# Patient Record
Sex: MALE | Race: WHITE | Employment: OTHER | URBAN - METROPOLITAN AREA
[De-identification: names, ages, dates, MRNs, and addresses within clinical notes are randomized per-mention and may not be internally consistent; named-entity substitution may affect disease eponyms.]

---

## 2017-12-14 PROBLEM — R55 SYNCOPE AND COLLAPSE: Status: ACTIVE | Noted: 2017-12-14

## 2017-12-14 PROBLEM — I24.9 ACS (ACUTE CORONARY SYNDROME) (HCC): Status: ACTIVE | Noted: 2017-12-14

## 2017-12-20 PROBLEM — I50.32 CHRONIC DIASTOLIC HEART FAILURE (HCC): Status: ACTIVE | Noted: 2017-12-20

## 2018-01-04 PROBLEM — Z95.2 S/P AVR (AORTIC VALVE REPLACEMENT): Status: ACTIVE | Noted: 2018-01-04

## 2018-01-04 PROBLEM — Z95.1 S/P CABG X 1: Status: ACTIVE | Noted: 2018-01-04

## 2018-01-09 ENCOUNTER — OFFICE VISIT (OUTPATIENT)
Dept: CARDIOLOGY CLINIC | Age: 63
End: 2018-01-09

## 2018-01-09 VITALS
BODY MASS INDEX: 34.44 KG/M2 | SYSTOLIC BLOOD PRESSURE: 122 MMHG | WEIGHT: 246 LBS | HEART RATE: 134 BPM | HEIGHT: 71 IN | DIASTOLIC BLOOD PRESSURE: 74 MMHG

## 2018-01-09 DIAGNOSIS — Z95.2 S/P AVR (AORTIC VALVE REPLACEMENT): Primary | ICD-10-CM

## 2018-01-09 DIAGNOSIS — Z95.1 S/P CABG X 1: ICD-10-CM

## 2018-01-09 PROCEDURE — 93000 ELECTROCARDIOGRAM COMPLETE: CPT | Performed by: INTERNAL MEDICINE

## 2018-01-09 PROCEDURE — 99214 OFFICE O/P EST MOD 30 MIN: CPT | Performed by: INTERNAL MEDICINE

## 2018-01-09 NOTE — PROGRESS NOTES
Pt here in office & educated on ADIEL, DCCV  for Dx: atrial flutter, scheduled for 1/10/2018 @ 0700, w/arrival @ 0600, @ [facility]; risks explained; & consents signed. Instructions given to pt to:  NPO after midnight night before procedure; Call hospital @ [phone] to pre-register; May take rest of morning meds. am including Eliquis of procedure, hold Lasix; & pt voiced understanding. Copies of consent, pre-testing orders, & info. sheet given to [facility] for scanning.

## 2018-01-10 ENCOUNTER — HOSPITAL ENCOUNTER (OUTPATIENT)
Dept: CARDIOLOGY | Age: 63
Discharge: HOME | End: 2018-01-10
Attending: INTERNAL MEDICINE | Admitting: INTERNAL MEDICINE

## 2018-01-10 ENCOUNTER — TELEPHONE (OUTPATIENT)
Dept: CARDIOLOGY CLINIC | Age: 63
End: 2018-01-10

## 2018-01-10 VITALS
RESPIRATION RATE: 23 BRPM | DIASTOLIC BLOOD PRESSURE: 75 MMHG | OXYGEN SATURATION: 92 % | SYSTOLIC BLOOD PRESSURE: 102 MMHG | HEART RATE: 93 BPM

## 2018-01-10 LAB
ANION GAP SERPL CALCULATED.3IONS-SCNC: 14 MMOL/L (ref 4–16)
BUN BLDV-MCNC: 19 MG/DL (ref 6–23)
CALCIUM SERPL-MCNC: 9.2 MG/DL (ref 8.3–10.6)
CHLORIDE BLD-SCNC: 99 MMOL/L (ref 99–110)
CO2: 26 MMOL/L (ref 21–32)
CREAT SERPL-MCNC: 1 MG/DL (ref 0.9–1.3)
GFR AFRICAN AMERICAN: >60 ML/MIN/1.73M2
GFR NON-AFRICAN AMERICAN: >60 ML/MIN/1.73M2
GLUCOSE BLD-MCNC: 162 MG/DL (ref 70–99)
INR BLD: 1.58 INDEX
POTASSIUM SERPL-SCNC: 4.5 MMOL/L (ref 3.5–5.1)
PROTHROMBIN TIME: 18.2 SECONDS (ref 9.12–12.5)
SODIUM BLD-SCNC: 139 MMOL/L (ref 135–145)

## 2018-01-10 PROCEDURE — 93325 DOPPLER ECHO COLOR FLOW MAPG: CPT | Performed by: INTERNAL MEDICINE

## 2018-01-10 PROCEDURE — 93312 ECHO TRANSESOPHAGEAL: CPT | Performed by: INTERNAL MEDICINE

## 2018-01-10 PROCEDURE — 92960 CARDIOVERSION ELECTRIC EXT: CPT | Performed by: INTERNAL MEDICINE

## 2018-01-10 RX ORDER — CARVEDILOL 6.25 MG/1
6.25 TABLET ORAL 2 TIMES DAILY
Qty: 60 TABLET | Refills: 3 | Status: SHIPPED | OUTPATIENT
Start: 2018-01-10 | End: 2019-06-07 | Stop reason: SDUPTHER

## 2018-01-10 NOTE — TELEPHONE ENCOUNTER
Called pt to schedule follow up cardioversion done 1/10/18, per Dr Rolin Nissen in patient call back work que, also ekg in one week. EKG is scheduled  on 1/17/18. Left message to call office to schedule follow up cardioversion.

## 2018-01-11 LAB
EKG ATRIAL RATE: 262 BPM
EKG ATRIAL RATE: 89 BPM
EKG DIAGNOSIS: NORMAL
EKG DIAGNOSIS: NORMAL
EKG P AXIS: 243 DEGREES
EKG P AXIS: 60 DEGREES
EKG P-R INTERVAL: 192 MS
EKG Q-T INTERVAL: 294 MS
EKG Q-T INTERVAL: 388 MS
EKG QRS DURATION: 102 MS
EKG QRS DURATION: 128 MS
EKG QTC CALCULATION (BAZETT): 434 MS
EKG QTC CALCULATION (BAZETT): 472 MS
EKG R AXIS: 30 DEGREES
EKG R AXIS: 32 DEGREES
EKG T AXIS: 167 DEGREES
EKG T AXIS: 72 DEGREES
EKG VENTRICULAR RATE: 131 BPM
EKG VENTRICULAR RATE: 89 BPM

## 2018-01-18 ENCOUNTER — PROCEDURE VISIT (OUTPATIENT)
Dept: CARDIOLOGY CLINIC | Age: 63
End: 2018-01-18

## 2018-01-18 ENCOUNTER — NURSE ONLY (OUTPATIENT)
Dept: CARDIOLOGY CLINIC | Age: 63
End: 2018-01-18

## 2018-01-18 VITALS
DIASTOLIC BLOOD PRESSURE: 80 MMHG | SYSTOLIC BLOOD PRESSURE: 128 MMHG | HEIGHT: 71 IN | HEART RATE: 78 BPM | BODY MASS INDEX: 34.02 KG/M2 | WEIGHT: 243 LBS

## 2018-01-18 DIAGNOSIS — Z95.2 S/P AVR (AORTIC VALVE REPLACEMENT): ICD-10-CM

## 2018-01-18 DIAGNOSIS — Z95.1 S/P CABG X 1: Primary | ICD-10-CM

## 2018-01-18 DIAGNOSIS — Z95.1 S/P CABG X 1: ICD-10-CM

## 2018-01-18 DIAGNOSIS — Z95.2 S/P AVR (AORTIC VALVE REPLACEMENT): Primary | ICD-10-CM

## 2018-01-18 LAB
LV EF: 53 %
LVEF MODALITY: NORMAL

## 2018-01-18 PROCEDURE — 93015 CV STRESS TEST SUPVJ I&R: CPT | Performed by: INTERNAL MEDICINE

## 2018-01-18 PROCEDURE — 93000 ELECTROCARDIOGRAM COMPLETE: CPT | Performed by: INTERNAL MEDICINE

## 2018-01-18 PROCEDURE — 93306 TTE W/DOPPLER COMPLETE: CPT | Performed by: INTERNAL MEDICINE

## 2018-01-19 ENCOUNTER — HOSPITAL ENCOUNTER (OUTPATIENT)
Dept: OTHER | Age: 63
Discharge: HOME | End: 2018-01-31
Attending: INTERNAL MEDICINE | Admitting: INTERNAL MEDICINE

## 2018-01-22 ENCOUNTER — TELEPHONE (OUTPATIENT)
Dept: CARDIOLOGY CLINIC | Age: 63
End: 2018-01-22

## 2018-01-22 NOTE — TELEPHONE ENCOUNTER
Left ventricular systolic function is normal with an ejection fraction of   50-55%.   Mild concentric left ventricular hypertrophy.   No evidence of diastolic dysfunction.   The left atrium is mild to moderately dilated.   Possible stenosis of bioprosthetic valve in aortic position with a possible   mild perivalvular leak.   The aortic valve area is 1.08 cm2 with a maximum gradient of 37 mmHg and a   mean gradient of 20 mmHg.   Mitral annular calcification is present.   No other significant valvulopathy seen.   No evidence of pericardial effusion.   Advised pt of echo results.

## 2018-01-29 LAB
GLUCOSE BLD-MCNC: 108 MG/DL (ref 70–99)
GLUCOSE BLD-MCNC: 118 MG/DL (ref 70–99)

## 2018-01-30 LAB
GLUCOSE BLD-MCNC: 119 MG/DL (ref 70–99)
GLUCOSE BLD-MCNC: 99 MG/DL (ref 70–99)

## 2018-02-01 ENCOUNTER — HOSPITAL ENCOUNTER (OUTPATIENT)
Dept: OTHER | Age: 63
Discharge: HOME | End: 2018-02-28
Attending: INTERNAL MEDICINE | Admitting: INTERNAL MEDICINE

## 2018-02-01 LAB
GLUCOSE BLD-MCNC: 104 MG/DL (ref 70–99)
GLUCOSE BLD-MCNC: 113 MG/DL (ref 70–99)

## 2018-02-05 LAB
GLUCOSE BLD-MCNC: 165 MG/DL (ref 70–99)
GLUCOSE BLD-MCNC: 98 MG/DL (ref 70–99)

## 2018-02-06 LAB
GLUCOSE BLD-MCNC: 115 MG/DL (ref 70–99)
GLUCOSE BLD-MCNC: 133 MG/DL (ref 70–99)

## 2018-02-08 LAB
GLUCOSE BLD-MCNC: 112 MG/DL (ref 70–99)
GLUCOSE BLD-MCNC: 130 MG/DL (ref 70–99)

## 2018-02-15 ENCOUNTER — OFFICE VISIT (OUTPATIENT)
Dept: CARDIOLOGY CLINIC | Age: 63
End: 2018-02-15

## 2018-02-15 ENCOUNTER — NURSE ONLY (OUTPATIENT)
Dept: CARDIOLOGY CLINIC | Age: 63
End: 2018-02-15

## 2018-02-15 VITALS
SYSTOLIC BLOOD PRESSURE: 122 MMHG | BODY MASS INDEX: 34.3 KG/M2 | HEIGHT: 71 IN | WEIGHT: 245 LBS | DIASTOLIC BLOOD PRESSURE: 78 MMHG | HEART RATE: 70 BPM

## 2018-02-15 DIAGNOSIS — Z95.2 S/P AVR (AORTIC VALVE REPLACEMENT): ICD-10-CM

## 2018-02-15 DIAGNOSIS — Z95.1 S/P CABG X 1: ICD-10-CM

## 2018-02-15 DIAGNOSIS — I48.0 PAF (PAROXYSMAL ATRIAL FIBRILLATION) (HCC): Primary | ICD-10-CM

## 2018-02-15 DIAGNOSIS — Z95.2 S/P AVR (AORTIC VALVE REPLACEMENT): Primary | ICD-10-CM

## 2018-02-15 PROCEDURE — 99214 OFFICE O/P EST MOD 30 MIN: CPT | Performed by: INTERNAL MEDICINE

## 2018-02-15 NOTE — PROGRESS NOTES
Applied @ 1030, 48hr holter w/monitor# [id number] for Dx of PAF. Educated pt on proper holter usage; how to keep sx diary; & when to bring monitor back to office. Pt voiced understanding. Holter order,including monitor & card#, & time started, to front nurse's station in 's in-box.

## 2018-02-21 PROCEDURE — 93228 REMOTE 30 DAY ECG REV/REPORT: CPT | Performed by: INTERNAL MEDICINE

## 2018-02-26 ENCOUNTER — TELEPHONE (OUTPATIENT)
Dept: CARDIOLOGY CLINIC | Age: 63
End: 2018-02-26

## 2018-02-26 NOTE — TELEPHONE ENCOUNTER
Patient was contacted to schedule consult with Dr Lavera Carrel as requested by Dr Shana Lee. Patient states if he is to continue Eliquis until he is seen by Dr Lavera Carrel he would like to know if he could receive more samples. He stated he could be in to pick the samples up tomorrow.

## 2018-03-01 ENCOUNTER — HOSPITAL ENCOUNTER (OUTPATIENT)
Dept: OTHER | Age: 63
Discharge: HOME | End: 2018-03-31
Attending: INTERNAL MEDICINE | Admitting: INTERNAL MEDICINE

## 2018-03-21 ENCOUNTER — TELEPHONE (OUTPATIENT)
Dept: CARDIOLOGY CLINIC | Age: 63
End: 2018-03-21

## 2018-03-21 NOTE — TELEPHONE ENCOUNTER
Per Ak's patient call back note , Ak wanted ekg next week.   Pt has appt with Latrice Rojas and will get ekg on the visit

## 2018-03-28 ENCOUNTER — INITIAL CONSULT (OUTPATIENT)
Dept: CARDIOLOGY CLINIC | Age: 63
End: 2018-03-28

## 2018-03-28 VITALS
OXYGEN SATURATION: 98 % | HEIGHT: 71 IN | WEIGHT: 243 LBS | SYSTOLIC BLOOD PRESSURE: 126 MMHG | BODY MASS INDEX: 34.02 KG/M2 | DIASTOLIC BLOOD PRESSURE: 72 MMHG | HEART RATE: 66 BPM | TEMPERATURE: 97.9 F

## 2018-03-28 DIAGNOSIS — Z95.1 S/P CABG X 1: ICD-10-CM

## 2018-03-28 DIAGNOSIS — Z95.2 S/P AVR (AORTIC VALVE REPLACEMENT): ICD-10-CM

## 2018-03-28 DIAGNOSIS — I48.0 PAF (PAROXYSMAL ATRIAL FIBRILLATION) (HCC): Primary | ICD-10-CM

## 2018-03-28 PROCEDURE — 99203 OFFICE O/P NEW LOW 30 MIN: CPT | Performed by: INTERNAL MEDICINE

## 2018-03-28 PROCEDURE — 93000 ELECTROCARDIOGRAM COMPLETE: CPT | Performed by: INTERNAL MEDICINE

## 2018-03-28 RX ORDER — ZOLPIDEM TARTRATE 10 MG/1
1 TABLET ORAL NIGHTLY PRN
Refills: 0 | COMMUNITY
Start: 2018-01-02 | End: 2018-11-05

## 2018-04-01 ENCOUNTER — HOSPITAL ENCOUNTER (OUTPATIENT)
Dept: OTHER | Age: 63
Discharge: HOME | End: 2018-04-30
Attending: INTERNAL MEDICINE | Admitting: INTERNAL MEDICINE

## 2018-04-08 ASSESSMENT — ENCOUNTER SYMPTOMS
CHEST TIGHTNESS: 0
DIARRHEA: 0
COLOR CHANGE: 0
BACK PAIN: 0
ABDOMINAL PAIN: 0
WHEEZING: 0
NAUSEA: 0
BLOOD IN STOOL: 0
EYE PAIN: 0
VOMITING: 0
COUGH: 0
CONSTIPATION: 0
PHOTOPHOBIA: 0
SHORTNESS OF BREATH: 0

## 2018-05-01 ENCOUNTER — HOSPITAL ENCOUNTER (OUTPATIENT)
Dept: OTHER | Age: 63
Discharge: HOME | End: 2018-05-31
Attending: INTERNAL MEDICINE | Admitting: INTERNAL MEDICINE

## 2018-05-17 ENCOUNTER — TELEPHONE (OUTPATIENT)
Dept: CARDIOLOGY CLINIC | Age: 63
End: 2018-05-17

## 2018-07-06 ENCOUNTER — OFFICE VISIT (OUTPATIENT)
Dept: CARDIOLOGY CLINIC | Age: 63
End: 2018-07-06

## 2018-07-06 VITALS
WEIGHT: 240 LBS | SYSTOLIC BLOOD PRESSURE: 130 MMHG | DIASTOLIC BLOOD PRESSURE: 74 MMHG | BODY MASS INDEX: 33.6 KG/M2 | HEIGHT: 71 IN | HEART RATE: 72 BPM

## 2018-07-06 DIAGNOSIS — Z95.1 S/P CABG X 1: ICD-10-CM

## 2018-07-06 DIAGNOSIS — Z95.2 S/P AVR (AORTIC VALVE REPLACEMENT): Primary | ICD-10-CM

## 2018-07-06 PROCEDURE — 99214 OFFICE O/P EST MOD 30 MIN: CPT | Performed by: INTERNAL MEDICINE

## 2018-11-07 PROBLEM — K43.2 RECURRENT INCISIONAL HERNIA: Status: ACTIVE | Noted: 2018-11-07

## 2018-12-12 LAB
ALBUMIN SERPL-MCNC: 4.3 G/DL
ALP BLD-CCNC: 66 U/L
ALT SERPL-CCNC: 20 U/L
ANION GAP SERPL CALCULATED.3IONS-SCNC: NORMAL MMOL/L
AST SERPL-CCNC: 22 U/L
AVERAGE GLUCOSE: NORMAL
BILIRUB SERPL-MCNC: 0.8 MG/DL (ref 0.1–1.4)
BUN BLDV-MCNC: 16 MG/DL
CALCIUM SERPL-MCNC: 9.4 MG/DL
CHLORIDE BLD-SCNC: 100 MMOL/L
CHOLESTEROL, TOTAL: 127 MG/DL
CHOLESTEROL/HDL RATIO: NORMAL
CO2: 25 MMOL/L
CREAT SERPL-MCNC: 0.9 MG/DL
GFR CALCULATED: 90
GLUCOSE BLD-MCNC: 144 MG/DL
HBA1C MFR BLD: 6.3 %
HDLC SERPL-MCNC: 39 MG/DL (ref 35–70)
LDL CHOLESTEROL CALCULATED: 77 MG/DL (ref 0–160)
POTASSIUM SERPL-SCNC: 4.5 MMOL/L
SODIUM BLD-SCNC: 137 MMOL/L
TOTAL PROTEIN: 7
TRIGL SERPL-MCNC: 55 MG/DL
VLDLC SERPL CALC-MCNC: 11 MG/DL

## 2019-02-11 ENCOUNTER — TELEPHONE (OUTPATIENT)
Dept: CARDIOLOGY CLINIC | Age: 64
End: 2019-02-11

## 2019-02-12 ENCOUNTER — TELEPHONE (OUTPATIENT)
Dept: CARDIOLOGY CLINIC | Age: 64
End: 2019-02-12

## 2019-02-12 NOTE — TELEPHONE ENCOUNTER
Wife called yesterday asking for  Call to discuss his medications   She stated no one called her back

## 2019-02-13 ENCOUNTER — OFFICE VISIT (OUTPATIENT)
Dept: CARDIOLOGY CLINIC | Age: 64
End: 2019-02-13
Payer: COMMERCIAL

## 2019-02-13 VITALS
DIASTOLIC BLOOD PRESSURE: 82 MMHG | WEIGHT: 252.6 LBS | SYSTOLIC BLOOD PRESSURE: 124 MMHG | BODY MASS INDEX: 35.36 KG/M2 | HEIGHT: 71 IN | HEART RATE: 64 BPM

## 2019-02-13 DIAGNOSIS — Z95.1 S/P CABG X 1: ICD-10-CM

## 2019-02-13 DIAGNOSIS — Z95.2 S/P AVR (AORTIC VALVE REPLACEMENT): Primary | ICD-10-CM

## 2019-02-13 PROCEDURE — 99214 OFFICE O/P EST MOD 30 MIN: CPT | Performed by: INTERNAL MEDICINE

## 2019-02-25 ENCOUNTER — PROCEDURE VISIT (OUTPATIENT)
Dept: CARDIOLOGY CLINIC | Age: 64
End: 2019-02-25
Payer: COMMERCIAL

## 2019-02-25 DIAGNOSIS — Z95.2 S/P AVR (AORTIC VALVE REPLACEMENT): ICD-10-CM

## 2019-02-25 DIAGNOSIS — Z95.1 S/P CABG X 1: ICD-10-CM

## 2019-02-25 LAB
LV EF: 58 %
LVEF MODALITY: NORMAL

## 2019-02-25 PROCEDURE — 93306 TTE W/DOPPLER COMPLETE: CPT | Performed by: INTERNAL MEDICINE

## 2019-02-26 ENCOUNTER — TELEPHONE (OUTPATIENT)
Dept: CARDIOLOGY CLINIC | Age: 64
End: 2019-02-26

## 2019-02-26 DIAGNOSIS — Z95.2 S/P AVR (AORTIC VALVE REPLACEMENT): Primary | ICD-10-CM

## 2019-03-29 ENCOUNTER — TELEPHONE (OUTPATIENT)
Dept: CARDIOLOGY CLINIC | Age: 64
End: 2019-03-29

## 2019-04-15 ENCOUNTER — TELEPHONE (OUTPATIENT)
Dept: CARDIOLOGY CLINIC | Age: 64
End: 2019-04-15

## 2019-04-16 NOTE — TELEPHONE ENCOUNTER
Questions answered, pt will check with medicare and decide if he wants to switch and then discuss with dr Gilford Slimmer

## 2019-05-08 ENCOUNTER — TELEPHONE (OUTPATIENT)
Dept: CARDIOLOGY CLINIC | Age: 64
End: 2019-05-08

## 2019-05-11 DIAGNOSIS — Z86.010 HX OF COLONIC POLYP: ICD-10-CM

## 2019-05-11 DIAGNOSIS — Z87.438 HISTORY OF BENIGN PROSTATIC HYPERPLASIA: ICD-10-CM

## 2019-05-11 DIAGNOSIS — I10 ESSENTIAL HYPERTENSION: ICD-10-CM

## 2019-05-11 DIAGNOSIS — E78.5 HYPERLIPIDEMIA, UNSPECIFIED HYPERLIPIDEMIA TYPE: ICD-10-CM

## 2019-05-11 DIAGNOSIS — I48.91 ATRIAL FIBRILLATION, UNSPECIFIED TYPE (HCC): ICD-10-CM

## 2019-05-11 RX ORDER — CLOTRIMAZOLE AND BETAMETHASONE DIPROPIONATE 10; .64 MG/G; MG/G
CREAM TOPICAL 2 TIMES DAILY
COMMUNITY
End: 2019-06-07 | Stop reason: ALTCHOICE

## 2019-05-11 RX ORDER — SILDENAFIL 50 MG/1
50 TABLET, FILM COATED ORAL PRN
COMMUNITY
End: 2019-06-07

## 2019-05-31 LAB
A/G RATIO: 1.6 (ref 1.1–2.2)
ALBUMIN SERPL-MCNC: 4.5 G/DL (ref 3.4–5)
ALP BLD-CCNC: 64 U/L (ref 40–129)
ALT SERPL-CCNC: 17 U/L (ref 10–40)
ANION GAP SERPL CALCULATED.3IONS-SCNC: 17 MMOL/L (ref 3–16)
AST SERPL-CCNC: 19 U/L (ref 15–37)
BILIRUB SERPL-MCNC: 0.7 MG/DL (ref 0–1)
BUN BLDV-MCNC: 13 MG/DL (ref 7–20)
CALCIUM SERPL-MCNC: 9.4 MG/DL (ref 8.3–10.6)
CHLORIDE BLD-SCNC: 101 MMOL/L (ref 99–110)
CHOLESTEROL, FASTING: 124 MG/DL (ref 0–199)
CO2: 21 MMOL/L (ref 21–32)
CREAT SERPL-MCNC: 0.9 MG/DL (ref 0.8–1.3)
ESTIMATED AVERAGE GLUCOSE: 142.7 MG/DL
GFR AFRICAN AMERICAN: >60
GFR NON-AFRICAN AMERICAN: >60
GLOBULIN: 2.9 G/DL
GLUCOSE FASTING: 148 MG/DL (ref 70–99)
HBA1C MFR BLD: 6.6 %
HDLC SERPL-MCNC: 43 MG/DL (ref 40–60)
LDL CHOLESTEROL CALCULATED: 64 MG/DL
POTASSIUM SERPL-SCNC: 4.8 MMOL/L (ref 3.5–5.1)
SODIUM BLD-SCNC: 139 MMOL/L (ref 136–145)
TOTAL PROTEIN: 7.4 G/DL (ref 6.4–8.2)
TRIGLYCERIDE, FASTING: 85 MG/DL (ref 0–150)
VLDLC SERPL CALC-MCNC: 17 MG/DL

## 2019-06-07 ENCOUNTER — OFFICE VISIT (OUTPATIENT)
Dept: FAMILY MEDICINE CLINIC | Age: 64
End: 2019-06-07
Payer: MEDICARE

## 2019-06-07 VITALS
SYSTOLIC BLOOD PRESSURE: 120 MMHG | HEART RATE: 60 BPM | WEIGHT: 244 LBS | DIASTOLIC BLOOD PRESSURE: 84 MMHG | HEIGHT: 71 IN | BODY MASS INDEX: 34.16 KG/M2

## 2019-06-07 DIAGNOSIS — E78.5 HYPERLIPIDEMIA, UNSPECIFIED HYPERLIPIDEMIA TYPE: ICD-10-CM

## 2019-06-07 DIAGNOSIS — Z87.438 HISTORY OF BENIGN PROSTATIC HYPERPLASIA: ICD-10-CM

## 2019-06-07 DIAGNOSIS — I10 ESSENTIAL HYPERTENSION: ICD-10-CM

## 2019-06-07 DIAGNOSIS — E11.9 TYPE 2 DIABETES MELLITUS WITHOUT COMPLICATION, WITHOUT LONG-TERM CURRENT USE OF INSULIN (HCC): Primary | ICD-10-CM

## 2019-06-07 DIAGNOSIS — I48.0 PAROXYSMAL ATRIAL FIBRILLATION (HCC): ICD-10-CM

## 2019-06-07 DIAGNOSIS — E66.01 CLASS 2 SEVERE OBESITY DUE TO EXCESS CALORIES WITH SERIOUS COMORBIDITY AND BODY MASS INDEX (BMI) OF 37.0 TO 37.9 IN ADULT (HCC): ICD-10-CM

## 2019-06-07 DIAGNOSIS — Z95.1 S/P CABG X 1: ICD-10-CM

## 2019-06-07 PROCEDURE — 99214 OFFICE O/P EST MOD 30 MIN: CPT | Performed by: FAMILY MEDICINE

## 2019-06-07 PROCEDURE — 2022F DILAT RTA XM EVC RTNOPTHY: CPT | Performed by: FAMILY MEDICINE

## 2019-06-07 PROCEDURE — 1036F TOBACCO NON-USER: CPT | Performed by: FAMILY MEDICINE

## 2019-06-07 PROCEDURE — 3017F COLORECTAL CA SCREEN DOC REV: CPT | Performed by: FAMILY MEDICINE

## 2019-06-07 PROCEDURE — 3044F HG A1C LEVEL LT 7.0%: CPT | Performed by: FAMILY MEDICINE

## 2019-06-07 PROCEDURE — G8417 CALC BMI ABV UP PARAM F/U: HCPCS | Performed by: FAMILY MEDICINE

## 2019-06-07 PROCEDURE — G8598 ASA/ANTIPLAT THER USED: HCPCS | Performed by: FAMILY MEDICINE

## 2019-06-07 PROCEDURE — G8427 DOCREV CUR MEDS BY ELIG CLIN: HCPCS | Performed by: FAMILY MEDICINE

## 2019-06-07 RX ORDER — CARVEDILOL 6.25 MG/1
6.25 TABLET ORAL 2 TIMES DAILY
Qty: 180 TABLET | Refills: 1 | Status: SHIPPED | OUTPATIENT
Start: 2019-06-07 | End: 2019-12-09 | Stop reason: SDUPTHER

## 2019-06-07 RX ORDER — ATORVASTATIN CALCIUM 10 MG/1
10 TABLET, FILM COATED ORAL DAILY
Qty: 90 TABLET | Refills: 1 | Status: SHIPPED | OUTPATIENT
Start: 2019-06-07 | End: 2019-11-17 | Stop reason: SDUPTHER

## 2019-06-07 RX ORDER — ASPIRIN 81 MG/1
81 TABLET ORAL DAILY
Qty: 90 TABLET | Refills: 1 | Status: SHIPPED | OUTPATIENT
Start: 2019-06-07 | End: 2021-01-04 | Stop reason: SDUPTHER

## 2019-06-07 RX ORDER — TAMSULOSIN HYDROCHLORIDE 0.4 MG/1
0.4 CAPSULE ORAL DAILY
Qty: 90 CAPSULE | Refills: 1 | Status: SHIPPED | OUTPATIENT
Start: 2019-06-07 | End: 2019-12-09 | Stop reason: SDUPTHER

## 2019-06-07 ASSESSMENT — ENCOUNTER SYMPTOMS
ABDOMINAL PAIN: 0
SHORTNESS OF BREATH: 1
CHEST TIGHTNESS: 0

## 2019-06-07 NOTE — PROGRESS NOTES
6/7/2019     Rogue Heimlich is a 58 y.o. male who presents today with   Chief Complaint   Patient presents with    6 Month Follow-Up     - no c/o       HPI    Patient is here for follow-up of multiple medical problems. He discussed Eliquis therapy with his cardiologist in day he had a 48 hour Holter that showed some bursts of atrial fibrillation. Therefore, he should stay on this at least for now. He is asking about warfarin over Eliquis I recommend Eliquis and he agrees. He gets some shortness of breath with top and activity, but it really doesn't limit his normal activities. Doesn't get any anginal chest pain. He had some pain in his upper back but it's in the area. The rhomboid muscle group. The right scapula. He is urinating okay. Is tolerating all of his medicines. He had labs done this week as he is diabetic and has a history of hyperlipidemia        REVIEW OF SYMPTOMS    Review of Systems   Constitutional: Negative for activity change, appetite change and unexpected weight change. HENT: Negative. Respiratory: Positive for shortness of breath. Negative for chest tightness. Cardiovascular: Negative for chest pain and palpitations. Gastrointestinal: Negative for abdominal pain. Genitourinary: Negative for difficulty urinating, frequency and urgency. Musculoskeletal: Negative. Neurological: Negative for dizziness. Psychiatric/Behavioral: Negative. PAST MEDICAL HISTORY  Past Medical History:   Diagnosis Date    Arthritis     Knees, Feet    Atrial fibrillation (HCC)     Back pain     Lower Back    CAD (coronary artery disease)     Diabetes mellitus ([facility] [address]     H/O echocardiogram 12/14/2017    EF 50-55%.  severe aortic stenosis is present, mod AR, mild MR and TR    History of benign prostatic hyperplasia     History of cardiac cath 12/14/2017    Severe AS, PDA MID 80% disease, RCA 50% distal stenosis, AVR + CABG VS TAVR and PCI, CTS CONS    History of echocardiogram 2019    EF 55-60%, Mildly dilated left atrium, Bioprosthetic aortic valve noted with increased velocities indicating stenosis, Mild paravalvular leak, Mitral annular calcification noted without evidence of stenosis    History of exercise stress test 2018    treadmill    History of Holter monitoring 02/15/2018    48 hour holter monitor  One short run of afib.  Hx of colonic polyp     Hx of Doppler echocardiogram 2018    EF50-55%,possible stenosis bioprosthetic valve possible perivalval leak,    Hyperlipidemia     Hypertension     Murmur, cardiac     Obesity     S/P AVR 12/15/2017    with cabg x1 svg-pda       FAMILY HISTORY  Family History   Problem Relation Age of Onset    Stroke Mother     Arthritis Mother     Early Death Father         Suicide in his 42's    Other Sister         Parkinson    High Blood Pressure Sister        SOCIAL HISTORY  Social History     Socioeconomic History    Marital status:      Spouse name: None    Number of children: None    Years of education: None    Highest education level: None   Occupational History    None   Social Needs    Financial resource strain: None    Food insecurity:     Worry: None     Inability: None    Transportation needs:     Medical: None     Non-medical: None   Tobacco Use    Smoking status: Former Smoker     Packs/day: 1.00     Years: 24.00     Pack years: 24.00     Start date:      Last attempt to quit:      Years since quittin.4    Smokeless tobacco: Never Used   Substance and Sexual Activity    Alcohol use:  Yes     Alcohol/week: 7.2 oz     Types: 12 Cans of beer per week    Drug use: No    Sexual activity: Never   Lifestyle    Physical activity:     Days per week: None     Minutes per session: None    Stress: None   Relationships    Social connections:     Talks on phone: None     Gets together: None     Attends Taoism service: None     Active member of club or organization: None     Attends meetings of clubs or organizations: None     Relationship status: None    Intimate partner violence:     Fear of current or ex partner: None     Emotionally abused: None     Physically abused: None     Forced sexual activity: None   Other Topics Concern    None   Social History Narrative    None        SURGICAL HISTORY  Past Surgical History:   Procedure Laterality Date    AORTIC VALVE REPLACEMENT  12/15/2017    with cabg x1 svg-pda    APPENDECTOMY  2013    COLONOSCOPY  9/16/16    Diverticulosis, polyp x1    HERNIA REPAIR  2014    with Mesh    HERNIA REPAIR  2018    JOINT REPLACEMENT Left 2014    TKA    KNEE CARTILAGE SURGERY Left     URACHAL CYST INCISION      Partial Bladder removal and Appendectomy    VENTRAL HERNIA REPAIR  4/7/16    laprascopic       CURRENT MEDICATIONS  Current Outpatient Medications   Medication Sig Dispense Refill    apixaban (ELIQUIS) 5 MG TABS tablet Take 1 tablet by mouth 2 times daily 180 tablet 0    carvedilol (COREG) 6.25 MG tablet Take 1 tablet by mouth 2 times daily 180 tablet 1    aspirin 81 MG EC tablet Take 1 tablet by mouth daily 90 tablet 1    tamsulosin (FLOMAX) 0.4 MG capsule Take 1 capsule by mouth daily 90 capsule 1    metFORMIN (GLUCOPHAGE) 500 MG tablet Take 4 tablets by mouth daily (with breakfast) 360 tablet 1    atorvastatin (LIPITOR) 10 MG tablet Take 1 tablet by mouth daily 90 tablet 1    Melatonin 5 MG CAPS Take 1 tablet by mouth nightly as needed       Multiple Vitamins-Minerals (PX SENIOR VITAMIN PO) Take 1 tablet by mouth daily       No current facility-administered medications for this visit. ALLERGIES  No Known Allergies    PHYSICAL EXAM    /84   Pulse 60   Ht 5' 11\" (1.803 m)   Wt 244 lb (110.7 kg)   BMI 34.03 kg/m²     Physical Exam   Constitutional: He is oriented to person, place, and time. He appears well-developed and well-nourished.    HENT:   Nose: Nose normal.   Mouth/Throat: Oropharynx is clear and moist.   Eyes: Conjunctivae are normal.   Neck: No thyromegaly present. Cardiovascular: Normal rate, regular rhythm, normal heart sounds and intact distal pulses. Pulmonary/Chest: Effort normal and breath sounds normal. No respiratory distress. He has no wheezes. He has no rales. Abdominal: Soft. He exhibits no distension. There is no tenderness. Musculoskeletal: He exhibits no edema or deformity. Lymphadenopathy:     He has no cervical adenopathy. Neurological: He is alert and oriented to person, place, and time. Psychiatric: He has a normal mood and affect. Nursing note and vitals reviewed. diabetic foot exam is been done by my LPN  I reviewed the patient's labs  His A1c is good at 6.6. Lipids are in range and chemistry panel is normal    ASSESSMENT:    Darlene Newman was seen today for 6 month follow-up. Diagnoses and all orders for this visit:    Type 2 diabetes mellitus without complication, without long-term current use of insulin (HCC)  -     Hemoglobin A1C; Future    Essential hypertension  -     Comprehensive Metabolic Panel; Future  -     Lipid Panel; Future    Hyperlipidemia, unspecified hyperlipidemia type    S/P CABG x 1    Class 2 severe obesity due to excess calories with serious comorbidity and body mass index (BMI) of 37.0 to 37.9 in adult [facility])    History of benign prostatic hyperplasia    Paroxysmal atrial fibrillation (HCC)    Other orders  -     apixaban (ELIQUIS) 5 MG TABS tablet; Take 1 tablet by mouth 2 times daily  -     carvedilol (COREG) 6.25 MG tablet; Take 1 tablet by mouth 2 times daily  -     aspirin 81 MG EC tablet; Take 1 tablet by mouth daily  -     tamsulosin (FLOMAX) 0.4 MG capsule; Take 1 capsule by mouth daily  -     metFORMIN (GLUCOPHAGE) 500 MG tablet; Take 4 tablets by mouth daily (with breakfast)  -     atorvastatin (LIPITOR) 10 MG tablet;  Take 1 tablet by mouth daily            PLAN:  Above problems are all stable    Would continue same medications, diet and exercise plan  Continue follow-up with consultants. Follow-up here in 6 months and get labs prior to that office visit      Return in about 6 months (around 12/7/2019). Electronically signed by Mariam Concepcion MD on 6/7/2019    Please note that this chart was generated using dragon dictation software. Although every effort was made to ensure the accuracy of this automated transcription, some errors in transcription may have occurred.

## 2019-06-07 NOTE — PROGRESS NOTES
Visual inspection:  Deformity/amputation: absent  Skin lesions/pre-ulcerative calluses: absent  Edema: right- negative, left- negative    Sensory exam:  Monofilament sensation: normal  (minimum of 5 random plantar locations tested, avoiding callused areas - > 1 area with absence of sensation is + for neuropathy)    Plus at least one of the following:  Pulses: normal,   Pinprick: Intact  Proprioception: Intact  Vibration (128 Hz):  Intact  Electronically signed by Laverne Sherman LPN on 1/4/5089 at 1:34 AM

## 2019-06-11 ENCOUNTER — TELEPHONE (OUTPATIENT)
Dept: FAMILY MEDICINE CLINIC | Age: 64
End: 2019-06-11

## 2019-06-11 RX ORDER — METFORMIN HYDROCHLORIDE 500 MG/1
2000 TABLET, EXTENDED RELEASE ORAL
Qty: 360 TABLET | Refills: 1 | Status: SHIPPED | OUTPATIENT
Start: 2019-06-11 | End: 2019-12-05 | Stop reason: SDUPTHER

## 2019-06-11 NOTE — TELEPHONE ENCOUNTER
PER [facility] SENT [facility] ([facility])  Electronically signed by Mode Watkins MA on 6/11/2019 at 3:41 PM

## 2019-08-26 ENCOUNTER — OFFICE VISIT (OUTPATIENT)
Dept: CARDIOLOGY CLINIC | Age: 64
End: 2019-08-26
Payer: MEDICARE

## 2019-08-26 VITALS
HEART RATE: 68 BPM | DIASTOLIC BLOOD PRESSURE: 80 MMHG | WEIGHT: 249 LBS | HEIGHT: 71 IN | BODY MASS INDEX: 34.86 KG/M2 | SYSTOLIC BLOOD PRESSURE: 130 MMHG

## 2019-08-26 DIAGNOSIS — Z95.2 S/P AVR (AORTIC VALVE REPLACEMENT): Primary | ICD-10-CM

## 2019-08-26 PROCEDURE — G8598 ASA/ANTIPLAT THER USED: HCPCS | Performed by: INTERNAL MEDICINE

## 2019-08-26 PROCEDURE — 1036F TOBACCO NON-USER: CPT | Performed by: INTERNAL MEDICINE

## 2019-08-26 PROCEDURE — G8427 DOCREV CUR MEDS BY ELIG CLIN: HCPCS | Performed by: INTERNAL MEDICINE

## 2019-08-26 PROCEDURE — 3017F COLORECTAL CA SCREEN DOC REV: CPT | Performed by: INTERNAL MEDICINE

## 2019-08-26 PROCEDURE — 4040F PNEUMOC VAC/ADMIN/RCVD: CPT | Performed by: INTERNAL MEDICINE

## 2019-08-26 PROCEDURE — G8417 CALC BMI ABV UP PARAM F/U: HCPCS | Performed by: INTERNAL MEDICINE

## 2019-08-26 PROCEDURE — 1123F ACP DISCUSS/DSCN MKR DOCD: CPT | Performed by: INTERNAL MEDICINE

## 2019-08-26 PROCEDURE — 99214 OFFICE O/P EST MOD 30 MIN: CPT | Performed by: INTERNAL MEDICINE

## 2019-08-26 NOTE — PROGRESS NOTES
90 tablet 1    tamsulosin (FLOMAX) 0.4 MG capsule Take 1 capsule by mouth daily 90 capsule 1    atorvastatin (LIPITOR) 10 MG tablet Take 1 tablet by mouth daily 90 tablet 1    Melatonin 5 MG CAPS Take 1 tablet by mouth nightly as needed       Multiple Vitamins-Minerals (PX SENIOR VITAMIN PO) Take 1 tablet by mouth daily       No current facility-administered medications for this visit. Allergies: Patient has no known allergies. Past Medical History:   Diagnosis Date    Arthritis     Knees, Feet    Atrial fibrillation (HCC)     Back pain     Lower Back    CAD (coronary artery disease)     Diabetes mellitus ([address]     H/O echocardiogram 12/14/2017    EF 50-55%. severe aortic stenosis is present, mod AR, mild MR and TR    History of benign prostatic hyperplasia     History of cardiac cath 12/14/2017    Severe AS, PDA MID 80% disease, RCA 50% distal stenosis, AVR + CABG VS TAVR and PCI, CTS CONS    History of echocardiogram 02/25/2019    EF 55-60%, Mildly dilated left atrium, Bioprosthetic aortic valve noted with increased velocities indicating stenosis, Mild paravalvular leak, Mitral annular calcification noted without evidence of stenosis    History of exercise stress test 01/18/2018    treadmill    History of Holter monitoring 02/15/2018    48 hour holter monitor  One short run of afib.     Hx of colonic polyp     Hx of Doppler echocardiogram 01/18/2018    EF50-55%,possible stenosis bioprosthetic valve possible perivalval leak,    Hyperlipidemia     Hypertension     Murmur, cardiac     Obesity     S/P AVR 12/15/2017    with cabg x1 svg-pda     Past Surgical History:   Procedure Laterality Date    AORTIC VALVE REPLACEMENT  12/15/2017    with cabg x1 svg-pda    APPENDECTOMY  2013    COLONOSCOPY  9/16/16    Diverticulosis, polyp x1    HERNIA REPAIR  2014    with Mesh    HERNIA REPAIR  2018    JOINT REPLACEMENT Left 2014    TKA    KNEE CARTILAGE SURGERY Left     URACHAL CYST INCISION Partial Bladder removal and Appendectomy    VENTRAL HERNIA REPAIR  16    laprascopic      As reviewed   Family History   Problem Relation Age of Onset    Stroke Mother     Arthritis Mother     Early Death Father         Suicide in his 42's    Other Sister         Parkinson    High Blood Pressure Sister      Social History     Tobacco Use    Smoking status: Former Smoker     Packs/day: 1.00     Years: 24.00     Pack years: 24.00     Start date:      Last attempt to quit:      Years since quittin.6    Smokeless tobacco: Never Used   Substance Use Topics    Alcohol use: Yes     Alcohol/week: 12.0 standard drinks     Types: 12 Cans of beer per week      Review of Systems:    Constitutional: Negative for diaphoresis and fatigue  Psychological:Negative for anxiety or depression  HENT: Negative for headaches, nasal congestion, sinus pain or vertigo  Eyes: Negative for visual disturbance. Endocrine: Negative for polydipsia/polyuria  Respiratory: Negative for shortness of breath  Cardiovascular: Negative for chest pain, dyspnea on exertion, claudication, edema, irregular heartbeat, murmur, palpitations or shortness of breath  Gastrointestinal: Negative for abdominal pain or heartburn  Genito-Urinary: Negative for urinary frequency/urgency  Musculoskeletal: Negative for muscle pain, muscular weakness, negative for pain in arm and leg or swelling in foot and leg  Neurological: Negative for dizziness, headaches, memory loss, numbness/tingling, visual changes, syncope  Dermatological: Negative for rash    Objective:  /80   Pulse 68   Ht 5' 11\" (1.803 m)   Wt 249 lb (112.9 kg)   BMI 34.73 kg/m²   Wt Readings from Last 3 Encounters:   19 249 lb (112.9 kg)   19 244 lb (110.7 kg)   19 250 lb (113.4 kg)     Body mass index is 34.73 kg/m². GENERAL - Alert, oriented, pleasant, in no apparent distress. EYES: No jaundice, no conjunctival pallor. SKIN: It is warm & dry.  No Chronic diastolic heart failure (HCC) I50.32    S/P AVR (aortic valve replacement) Z95.2    S/P CABG x 1 Z95.1    Typical atrial flutter (HCC) I48.3    Recurrent incisional hernia K43.2    Atrial fibrillation (HCC) I48.91    Diabetes mellitus (HCC) E11.9    Hyperlipidemia E78.5    Hypertension I10    History of benign prostatic hyperplasia Z87.438    Obesity E66.9    Hx of colonic polyp Z86.010       Assessment & Plan:               -     CORONARY ARTERY DISEASE:  asymptomatic     All available  tests in chart reviewed. Management discussed . Testing ordered  no                               - VHD  SP  AVR  stable    -   DIABETES MELLITUS: Available pertinent lab data reviewed   and  patient was given dietary advice . Advised to check blood glucose level on a regular basis. -   Changes  in medicines made: No           -  LIPID MANAGEMENT:  Available lipid  lab data reviewed  and patient was given dietary advice. NCEP- ATP III guidelines reviewed with patient. -   Changes  in medicines made:  No                         - PAF in  RRR  On anticoag, can take occasional motrin                      Emiliano Anthony MA  [facility]

## 2019-08-29 ENCOUNTER — OFFICE VISIT (OUTPATIENT)
Dept: FAMILY MEDICINE CLINIC | Age: 64
End: 2019-08-29
Payer: MEDICARE

## 2019-08-29 VITALS
SYSTOLIC BLOOD PRESSURE: 120 MMHG | BODY MASS INDEX: 23.85 KG/M2 | HEIGHT: 71 IN | WEIGHT: 170.4 LBS | HEART RATE: 68 BPM | DIASTOLIC BLOOD PRESSURE: 70 MMHG

## 2019-08-29 DIAGNOSIS — E78.5 HYPERLIPIDEMIA, UNSPECIFIED HYPERLIPIDEMIA TYPE: Primary | ICD-10-CM

## 2019-08-29 DIAGNOSIS — R42 DIZZINESS: ICD-10-CM

## 2019-08-29 DIAGNOSIS — I48.0 PAROXYSMAL ATRIAL FIBRILLATION (HCC): ICD-10-CM

## 2019-08-29 PROCEDURE — 4040F PNEUMOC VAC/ADMIN/RCVD: CPT | Performed by: FAMILY MEDICINE

## 2019-08-29 PROCEDURE — 99213 OFFICE O/P EST LOW 20 MIN: CPT | Performed by: FAMILY MEDICINE

## 2019-08-29 PROCEDURE — G8427 DOCREV CUR MEDS BY ELIG CLIN: HCPCS | Performed by: FAMILY MEDICINE

## 2019-08-29 PROCEDURE — G8598 ASA/ANTIPLAT THER USED: HCPCS | Performed by: FAMILY MEDICINE

## 2019-08-29 PROCEDURE — G8420 CALC BMI NORM PARAMETERS: HCPCS | Performed by: FAMILY MEDICINE

## 2019-08-29 PROCEDURE — 1036F TOBACCO NON-USER: CPT | Performed by: FAMILY MEDICINE

## 2019-08-29 PROCEDURE — 1123F ACP DISCUSS/DSCN MKR DOCD: CPT | Performed by: FAMILY MEDICINE

## 2019-08-29 PROCEDURE — 3017F COLORECTAL CA SCREEN DOC REV: CPT | Performed by: FAMILY MEDICINE

## 2019-08-29 ASSESSMENT — PATIENT HEALTH QUESTIONNAIRE - PHQ9
2. FEELING DOWN, DEPRESSED OR HOPELESS: 0
SUM OF ALL RESPONSES TO PHQ QUESTIONS 1-9: 0
1. LITTLE INTEREST OR PLEASURE IN DOING THINGS: 0
SUM OF ALL RESPONSES TO PHQ9 QUESTIONS 1 & 2: 0
SUM OF ALL RESPONSES TO PHQ QUESTIONS 1-9: 0

## 2019-08-29 ASSESSMENT — ENCOUNTER SYMPTOMS
DIARRHEA: 0
SHORTNESS OF BREATH: 0
VOMITING: 0
ABDOMINAL PAIN: 0
NAUSEA: 0
COUGH: 0

## 2019-08-29 NOTE — PROGRESS NOTES
palpitations and leg swelling. Gastrointestinal: Negative for abdominal pain, diarrhea, nausea and vomiting. Skin: Negative for rash. Neurological: Positive for dizziness (Brief), tremors (And occasional take that is worse when he strains) and light-headedness (Brief). Negative for syncope, facial asymmetry, speech difficulty, weakness and headaches. PAST MEDICAL HISTORY  Past Medical History:   Diagnosis Date    Arthritis     Knees, Feet    Atrial fibrillation (HCC)     Back pain     Lower Back    CAD (coronary artery disease)     Diabetes mellitus ([address]     H/O echocardiogram 12/14/2017    EF 50-55%. severe aortic stenosis is present, mod AR, mild MR and TR    History of benign prostatic hyperplasia     History of cardiac cath 12/14/2017    Severe AS, PDA MID 80% disease, RCA 50% distal stenosis, AVR + CABG VS TAVR and PCI, CTS CONS    History of echocardiogram 02/25/2019    EF 55-60%, Mildly dilated left atrium, Bioprosthetic aortic valve noted with increased velocities indicating stenosis, Mild paravalvular leak, Mitral annular calcification noted without evidence of stenosis    History of exercise stress test 01/18/2018    treadmill    History of Holter monitoring 02/15/2018    48 hour holter monitor  One short run of afib.     Hx of colonic polyp     Hx of Doppler echocardiogram 01/18/2018    EF50-55%,possible stenosis bioprosthetic valve possible perivalval leak,    Hyperlipidemia     Hypertension     Murmur, cardiac     Obesity     S/P AVR 12/15/2017    with cabg x1 svg-pda       FAMILY HISTORY  Family History   Problem Relation Age of Onset    Stroke Mother     Arthritis Mother     Early Death Father         Suicide in his 42's    Other Sister         Parkinson    High Blood Pressure Sister        SOCIAL HISTORY  Social History     Socioeconomic History    Marital status:      Spouse name: Not on file    Number of children: Not on file    Years of education: Not

## 2019-08-30 DIAGNOSIS — R42 DIZZINESS: ICD-10-CM

## 2019-08-30 DIAGNOSIS — E78.5 HYPERLIPIDEMIA, UNSPECIFIED HYPERLIPIDEMIA TYPE: ICD-10-CM

## 2019-08-30 LAB
A/G RATIO: 1.6 (ref 1.1–2.2)
ALBUMIN SERPL-MCNC: 4.3 G/DL (ref 3.4–5)
ALP BLD-CCNC: 58 U/L (ref 40–129)
ALT SERPL-CCNC: 17 U/L (ref 10–40)
ANION GAP SERPL CALCULATED.3IONS-SCNC: 15 MMOL/L (ref 3–16)
AST SERPL-CCNC: 18 U/L (ref 15–37)
BASOPHILS ABSOLUTE: 0.1 K/UL (ref 0–0.2)
BASOPHILS RELATIVE PERCENT: 1.2 %
BILIRUB SERPL-MCNC: 0.6 MG/DL (ref 0–1)
BUN BLDV-MCNC: 20 MG/DL (ref 7–20)
CALCIUM SERPL-MCNC: 9.4 MG/DL (ref 8.3–10.6)
CHLORIDE BLD-SCNC: 103 MMOL/L (ref 99–110)
CHOLESTEROL, TOTAL: 139 MG/DL (ref 0–199)
CO2: 22 MMOL/L (ref 21–32)
CREAT SERPL-MCNC: 0.9 MG/DL (ref 0.8–1.3)
EOSINOPHILS ABSOLUTE: 0.2 K/UL (ref 0–0.6)
EOSINOPHILS RELATIVE PERCENT: 2.2 %
GFR AFRICAN AMERICAN: >60
GFR NON-AFRICAN AMERICAN: >60
GLOBULIN: 2.7 G/DL
GLUCOSE BLD-MCNC: 140 MG/DL (ref 70–99)
HCT VFR BLD CALC: 44 % (ref 40.5–52.5)
HDLC SERPL-MCNC: 44 MG/DL (ref 40–60)
HEMOGLOBIN: 15.1 G/DL (ref 13.5–17.5)
LDL CHOLESTEROL CALCULATED: 80 MG/DL
LYMPHOCYTES ABSOLUTE: 2.7 K/UL (ref 1–5.1)
LYMPHOCYTES RELATIVE PERCENT: 34.9 %
MAGNESIUM: 2.4 MG/DL (ref 1.8–2.4)
MCH RBC QN AUTO: 32.2 PG (ref 26–34)
MCHC RBC AUTO-ENTMCNC: 34.5 G/DL (ref 31–36)
MCV RBC AUTO: 93.3 FL (ref 80–100)
MONOCYTES ABSOLUTE: 0.5 K/UL (ref 0–1.3)
MONOCYTES RELATIVE PERCENT: 6.2 %
NEUTROPHILS ABSOLUTE: 4.3 K/UL (ref 1.7–7.7)
NEUTROPHILS RELATIVE PERCENT: 55.5 %
PDW BLD-RTO: 13 % (ref 12.4–15.4)
PLATELET # BLD: 282 K/UL (ref 135–450)
PMV BLD AUTO: 8.3 FL (ref 5–10.5)
POTASSIUM SERPL-SCNC: 4.5 MMOL/L (ref 3.5–5.1)
RBC # BLD: 4.71 M/UL (ref 4.2–5.9)
SODIUM BLD-SCNC: 140 MMOL/L (ref 136–145)
T4 FREE: 1.3 NG/DL (ref 0.9–1.8)
TOTAL PROTEIN: 7 G/DL (ref 6.4–8.2)
TRIGL SERPL-MCNC: 74 MG/DL (ref 0–150)
TSH SERPL DL<=0.05 MIU/L-ACNC: 5.26 UIU/ML (ref 0.27–4.2)
VLDLC SERPL CALC-MCNC: 15 MG/DL
WBC # BLD: 7.8 K/UL (ref 4–11)

## 2019-09-03 ENCOUNTER — TELEPHONE (OUTPATIENT)
Dept: FAMILY MEDICINE CLINIC | Age: 64
End: 2019-09-03

## 2019-09-03 ENCOUNTER — HOSPITAL ENCOUNTER (OUTPATIENT)
Dept: CT IMAGING | Age: 64
Discharge: HOME | End: 2019-09-03
Payer: MEDICARE

## 2019-09-03 DIAGNOSIS — R73.9 HYPERGLYCEMIA: Primary | ICD-10-CM

## 2019-09-03 DIAGNOSIS — R42 DIZZINESS: ICD-10-CM

## 2019-09-03 PROCEDURE — 70450 CT HEAD/BRAIN W/O DYE: CPT

## 2019-09-03 RX ORDER — APIXABAN 5 MG/1
TABLET, FILM COATED ORAL
Qty: 180 TABLET | Refills: 0 | Status: SHIPPED | OUTPATIENT
Start: 2019-09-03 | End: 2019-12-09 | Stop reason: SDUPTHER

## 2019-11-04 ENCOUNTER — TELEPHONE (OUTPATIENT)
Dept: CARDIOLOGY CLINIC | Age: 64
End: 2019-11-04

## 2019-12-03 ENCOUNTER — TELEPHONE (OUTPATIENT)
Dept: FAMILY MEDICINE CLINIC | Age: 64
End: 2019-12-03

## 2019-12-03 DIAGNOSIS — Z12.5 SCREENING FOR PROSTATE CANCER: ICD-10-CM

## 2019-12-03 DIAGNOSIS — E11.9 TYPE 2 DIABETES MELLITUS WITHOUT COMPLICATION, WITHOUT LONG-TERM CURRENT USE OF INSULIN (HCC): Primary | ICD-10-CM

## 2019-12-04 DIAGNOSIS — E11.9 TYPE 2 DIABETES MELLITUS WITHOUT COMPLICATION, WITHOUT LONG-TERM CURRENT USE OF INSULIN (HCC): ICD-10-CM

## 2019-12-04 DIAGNOSIS — Z12.5 SCREENING FOR PROSTATE CANCER: ICD-10-CM

## 2019-12-04 LAB
A/G RATIO: 1.6 (ref 1.1–2.2)
ALBUMIN SERPL-MCNC: 4.2 G/DL (ref 3.4–5)
ALP BLD-CCNC: 53 U/L (ref 40–129)
ALT SERPL-CCNC: 17 U/L (ref 10–40)
ANION GAP SERPL CALCULATED.3IONS-SCNC: 17 MMOL/L (ref 3–16)
AST SERPL-CCNC: 21 U/L (ref 15–37)
BILIRUB SERPL-MCNC: 0.7 MG/DL (ref 0–1)
BUN BLDV-MCNC: 19 MG/DL (ref 7–20)
CALCIUM SERPL-MCNC: 9.4 MG/DL (ref 8.3–10.6)
CHLORIDE BLD-SCNC: 104 MMOL/L (ref 99–110)
CHOLESTEROL, TOTAL: 124 MG/DL (ref 0–199)
CO2: 19 MMOL/L (ref 21–32)
CREAT SERPL-MCNC: 0.9 MG/DL (ref 0.8–1.3)
GFR AFRICAN AMERICAN: >60
GFR NON-AFRICAN AMERICAN: >60
GLOBULIN: 2.6 G/DL
GLUCOSE BLD-MCNC: 135 MG/DL (ref 70–99)
HDLC SERPL-MCNC: 46 MG/DL (ref 40–60)
LDL CHOLESTEROL CALCULATED: 69 MG/DL
POTASSIUM SERPL-SCNC: 4.6 MMOL/L (ref 3.5–5.1)
PROSTATE SPECIFIC ANTIGEN: 0.42 NG/ML (ref 0–4)
SODIUM BLD-SCNC: 140 MMOL/L (ref 136–145)
TOTAL PROTEIN: 6.8 G/DL (ref 6.4–8.2)
TRIGL SERPL-MCNC: 47 MG/DL (ref 0–150)
VLDLC SERPL CALC-MCNC: 9 MG/DL

## 2019-12-05 LAB
ESTIMATED AVERAGE GLUCOSE: 134.1 MG/DL
HBA1C MFR BLD: 6.3 %

## 2019-12-05 RX ORDER — METFORMIN HYDROCHLORIDE 500 MG/1
2000 TABLET, EXTENDED RELEASE ORAL
Qty: 360 TABLET | Refills: 1 | Status: SHIPPED | OUTPATIENT
Start: 2019-12-05 | End: 2019-12-09 | Stop reason: SDUPTHER

## 2019-12-06 ENCOUNTER — TELEPHONE (OUTPATIENT)
Dept: FAMILY MEDICINE CLINIC | Age: 64
End: 2019-12-06

## 2019-12-09 ENCOUNTER — OFFICE VISIT (OUTPATIENT)
Dept: FAMILY MEDICINE CLINIC | Age: 64
End: 2019-12-09
Payer: MEDICARE

## 2019-12-09 VITALS
BODY MASS INDEX: 35.39 KG/M2 | DIASTOLIC BLOOD PRESSURE: 80 MMHG | SYSTOLIC BLOOD PRESSURE: 122 MMHG | HEART RATE: 58 BPM | WEIGHT: 252.8 LBS | HEIGHT: 71 IN

## 2019-12-09 DIAGNOSIS — I48.0 PAROXYSMAL ATRIAL FIBRILLATION (HCC): ICD-10-CM

## 2019-12-09 DIAGNOSIS — E78.5 HYPERLIPIDEMIA, UNSPECIFIED HYPERLIPIDEMIA TYPE: Primary | ICD-10-CM

## 2019-12-09 DIAGNOSIS — E11.9 TYPE 2 DIABETES MELLITUS WITHOUT COMPLICATION, WITHOUT LONG-TERM CURRENT USE OF INSULIN (HCC): ICD-10-CM

## 2019-12-09 DIAGNOSIS — Z23 NEED FOR PROPHYLACTIC VACCINATION AND INOCULATION AGAINST VARICELLA: ICD-10-CM

## 2019-12-09 DIAGNOSIS — Z95.2 S/P AVR (AORTIC VALVE REPLACEMENT): ICD-10-CM

## 2019-12-09 DIAGNOSIS — Z12.11 SCREENING FOR COLON CANCER: ICD-10-CM

## 2019-12-09 DIAGNOSIS — Z95.1 S/P CABG X 1: ICD-10-CM

## 2019-12-09 PROCEDURE — 99214 OFFICE O/P EST MOD 30 MIN: CPT | Performed by: FAMILY MEDICINE

## 2019-12-09 PROCEDURE — 1123F ACP DISCUSS/DSCN MKR DOCD: CPT | Performed by: FAMILY MEDICINE

## 2019-12-09 PROCEDURE — 4040F PNEUMOC VAC/ADMIN/RCVD: CPT | Performed by: FAMILY MEDICINE

## 2019-12-09 PROCEDURE — G8482 FLU IMMUNIZE ORDER/ADMIN: HCPCS | Performed by: FAMILY MEDICINE

## 2019-12-09 PROCEDURE — 1036F TOBACCO NON-USER: CPT | Performed by: FAMILY MEDICINE

## 2019-12-09 PROCEDURE — 2022F DILAT RTA XM EVC RTNOPTHY: CPT | Performed by: FAMILY MEDICINE

## 2019-12-09 PROCEDURE — 3017F COLORECTAL CA SCREEN DOC REV: CPT | Performed by: FAMILY MEDICINE

## 2019-12-09 PROCEDURE — G8417 CALC BMI ABV UP PARAM F/U: HCPCS | Performed by: FAMILY MEDICINE

## 2019-12-09 PROCEDURE — G8427 DOCREV CUR MEDS BY ELIG CLIN: HCPCS | Performed by: FAMILY MEDICINE

## 2019-12-09 PROCEDURE — 3044F HG A1C LEVEL LT 7.0%: CPT | Performed by: FAMILY MEDICINE

## 2019-12-09 PROCEDURE — G8598 ASA/ANTIPLAT THER USED: HCPCS | Performed by: FAMILY MEDICINE

## 2019-12-09 RX ORDER — TAMSULOSIN HYDROCHLORIDE 0.4 MG/1
0.4 CAPSULE ORAL DAILY
Qty: 90 CAPSULE | Refills: 1 | Status: SHIPPED | OUTPATIENT
Start: 2019-12-09 | End: 2020-06-09

## 2019-12-09 RX ORDER — ATORVASTATIN CALCIUM 10 MG/1
TABLET, FILM COATED ORAL
Qty: 90 TABLET | Refills: 1 | Status: SHIPPED | OUTPATIENT
Start: 2019-12-09 | End: 2020-06-09 | Stop reason: SDUPTHER

## 2019-12-09 RX ORDER — CARVEDILOL 6.25 MG/1
6.25 TABLET ORAL 2 TIMES DAILY
Qty: 180 TABLET | Refills: 1 | Status: SHIPPED | OUTPATIENT
Start: 2019-12-09 | End: 2020-06-09 | Stop reason: SDUPTHER

## 2019-12-09 RX ORDER — METFORMIN HYDROCHLORIDE 500 MG/1
2000 TABLET, EXTENDED RELEASE ORAL
Qty: 360 TABLET | Refills: 1 | Status: SHIPPED | OUTPATIENT
Start: 2019-12-09 | End: 2020-06-09 | Stop reason: SDUPTHER

## 2019-12-09 ASSESSMENT — ENCOUNTER SYMPTOMS
CHEST TIGHTNESS: 0
COUGH: 0
SHORTNESS OF BREATH: 0
ABDOMINAL PAIN: 0
RESPIRATORY NEGATIVE: 1
WHEEZING: 0

## 2020-05-29 ENCOUNTER — TELEMEDICINE (OUTPATIENT)
Dept: CARDIOLOGY CLINIC | Age: 65
End: 2020-05-29
Payer: MEDICARE

## 2020-05-29 VITALS
BODY MASS INDEX: 34.58 KG/M2 | HEIGHT: 71 IN | HEART RATE: 64 BPM | DIASTOLIC BLOOD PRESSURE: 60 MMHG | WEIGHT: 247 LBS | SYSTOLIC BLOOD PRESSURE: 110 MMHG

## 2020-05-29 DIAGNOSIS — E11.9 TYPE 2 DIABETES MELLITUS WITHOUT COMPLICATION, WITHOUT LONG-TERM CURRENT USE OF INSULIN (HCC): ICD-10-CM

## 2020-05-29 DIAGNOSIS — E78.5 HYPERLIPIDEMIA, UNSPECIFIED HYPERLIPIDEMIA TYPE: ICD-10-CM

## 2020-05-29 LAB
A/G RATIO: 1.7 (ref 1.1–2.2)
ALBUMIN SERPL-MCNC: 4.5 G/DL (ref 3.4–5)
ALP BLD-CCNC: 58 U/L (ref 40–129)
ALT SERPL-CCNC: 18 U/L (ref 10–40)
ANION GAP SERPL CALCULATED.3IONS-SCNC: 13 MMOL/L (ref 3–16)
AST SERPL-CCNC: 23 U/L (ref 15–37)
BILIRUB SERPL-MCNC: 0.8 MG/DL (ref 0–1)
BUN BLDV-MCNC: 15 MG/DL (ref 7–20)
CALCIUM SERPL-MCNC: 9.2 MG/DL (ref 8.3–10.6)
CHLORIDE BLD-SCNC: 100 MMOL/L (ref 99–110)
CHOLESTEROL, TOTAL: 135 MG/DL (ref 0–199)
CO2: 21 MMOL/L (ref 21–32)
CREAT SERPL-MCNC: 0.7 MG/DL (ref 0.8–1.3)
GFR AFRICAN AMERICAN: >60
GFR NON-AFRICAN AMERICAN: >60
GLOBULIN: 2.7 G/DL
GLUCOSE BLD-MCNC: 139 MG/DL (ref 70–99)
HDLC SERPL-MCNC: 54 MG/DL (ref 40–60)
LDL CHOLESTEROL CALCULATED: 71 MG/DL
POTASSIUM SERPL-SCNC: 4.5 MMOL/L (ref 3.5–5.1)
SODIUM BLD-SCNC: 134 MMOL/L (ref 136–145)
TOTAL PROTEIN: 7.2 G/DL (ref 6.4–8.2)
TRIGL SERPL-MCNC: 52 MG/DL (ref 0–150)
VLDLC SERPL CALC-MCNC: 10 MG/DL

## 2020-05-29 PROCEDURE — 99214 OFFICE O/P EST MOD 30 MIN: CPT | Performed by: INTERNAL MEDICINE

## 2020-05-29 NOTE — PROGRESS NOTES
CARDIOLOGY NOTE      5/29/2020    RE: Ramo Tate  ([id number])                               TO:  Dr. Rene Phan MD            CHIEF COMPLAINT   Mike Salvador is a 71 y.o. male who was seen today for management of  cad                                    HPI:   Patient is here for    - Coronary artery disease, does not have chest pain. Patient is  compliant with prescribed medicines. - Hypertension,is  well controlled, pt is  compliant with medicines  - Hyperlipidimea, lipids are in acceptable range. Pt  is  compliant with medicines  - Atrial fibrillation, pt is  compliant with meds.  Patient does not have symptoms from atrial fibrillation  - VHD  SP  AVR  No issues                The patient does not have cardiac complaints    Ramo Tate has the following history recorded in care path:  Patient Active Problem List    Diagnosis Date Noted    Typical atrial flutter ([facility] [address]      Priority: High    Syncope and collapse 12/14/2017     Priority: Low    ACS (acute coronary syndrome) ([facility] [address] 12/14/2017     Priority: Low    Atrial fibrillation ([facility] [address]     Diabetes mellitus ([facility] [address]     Hyperlipidemia     Hypertension     History of benign prostatic hyperplasia     Obesity     Hx of colonic polyp     Recurrent incisional hernia 11/07/2018    S/P AVR (aortic valve replacement) 01/04/2018    S/P CABG x 1 01/04/2018    Chronic diastolic heart failure (HCC) 12/20/2017     Current Outpatient Medications   Medication Sig Dispense Refill    metFORMIN (GLUCOPHAGE-XR) 500 MG extended release tablet Take 4 tablets by mouth daily (with breakfast) 360 tablet 1    apixaban (ELIQUIS) 5 MG TABS tablet TAKE 1 TABLET BY MOUTH TWICE A  tablet 1    carvedilol (COREG) 6.25 MG tablet Take 1 tablet by mouth 2 times daily 180 tablet 1    atorvastatin (LIPITOR) 10 MG tablet TAKE 1 TABLET BY MOUTH EVERY DAY 90 tablet 1    aspirin 81 MG EC tablet Take 1 tablet by mouth daily 90 tablet 1    Melatonin 5 MG CAPS Take 1 tablet by mouth nightly as needed       Multiple Vitamins-Minerals (PX SENIOR VITAMIN PO) Take 1 tablet by mouth daily      tamsulosin (FLOMAX) 0.4 MG capsule Take 1 capsule by mouth daily (Patient not taking: Reported on 5/29/2020) 90 capsule 1     No current facility-administered medications for this visit. Allergies: Patient has no known allergies. Past Medical History:   Diagnosis Date    Arthritis     Knees, Feet    Atrial fibrillation (HCC)     Back pain     Lower Back    CAD (coronary artery disease)     Diabetes mellitus ([address]     H/O echocardiogram 12/14/2017    EF 50-55%. severe aortic stenosis is present, mod AR, mild MR and TR    History of benign prostatic hyperplasia     History of cardiac cath 12/14/2017    Severe AS, PDA MID 80% disease, RCA 50% distal stenosis, AVR + CABG VS TAVR and PCI, CTS CONS    History of echocardiogram 02/25/2019    EF 55-60%, Mildly dilated left atrium, Bioprosthetic aortic valve noted with increased velocities indicating stenosis, Mild paravalvular leak, Mitral annular calcification noted without evidence of stenosis    History of exercise stress test 01/18/2018    treadmill    History of Holter monitoring 02/15/2018    48 hour holter monitor  One short run of afib.     Hx of colonic polyp     Hx of Doppler echocardiogram 01/18/2018    EF50-55%,possible stenosis bioprosthetic valve possible perivalval leak,    Hyperlipidemia     Hypertension     Murmur, cardiac     Obesity     S/P AVR 12/15/2017    with cabg x1 svg-pda     Past Surgical History:   Procedure Laterality Date    AORTIC VALVE REPLACEMENT  12/15/2017    with cabg x1 svg-pda    APPENDECTOMY  2013    COLONOSCOPY  9/16/16    Diverticulosis, polyp x1    HERNIA REPAIR  2014    with Mesh    HERNIA REPAIR  2018    JOINT REPLACEMENT Left 2014    TKA    KNEE CARTILAGE SURGERY Left     URACHAL CYST INCISION      Partial Bladder removal and Appendectomy    VENTRAL HERNIA REPAIR Typical atrial flutter (HCC) I48.3    Recurrent incisional hernia K43.2    Atrial fibrillation (HCC) I48.91    Diabetes mellitus ([address] E11.9    Hyperlipidemia E78.5    Hypertension I10    History of benign prostatic hyperplasia Z87.438    Obesity E66.9    Hx of colonic polyp Z86.010       Assessment & Plan:               -     CORONARY ARTERY DISEASE:  asymptomatic     All available  tests in chart reviewed. Management discussed . Testing ordered  no                               - VHD  SP  AVR  stable    -   DIABETES MELLITUS: Available pertinent lab data reviewed   and  patient was given dietary advice . Advised to check blood glucose level on a regular basis. -   Changes  in medicines made: No           -  LIPID MANAGEMENT:  Available lipid  lab data reviewed  and patient was given dietary advice. NCEP- ATP III guidelines reviewed with patient. -   Changes  in medicines made:  No                         - PAF in  RRR  On anticoag, can take occasional cesia Valencia MA  [facility]

## 2020-05-29 NOTE — PROGRESS NOTES
CARDIOLOGY NOTE      5/29/2020    RE: Gisselle Colón  ([id number])                               TO:  Dr. Rutherford Lesches, MD            CHIEF COMPLAINT   Maria Elena Torres is a 71 y.o. male who was seen today for management of  cad                                    HPI:   Patient is here for    - Coronary artery disease, does not have chest pain. Patient is  compliant with prescribed medicines. - Hypertension,is  well controlled, pt is  compliant with medicines  - Hyperlipidimea, lipids are in acceptable range. Pt  is  compliant with medicines  - Atrial fibrillation, pt is  compliant with meds.  Patient does not have symptoms from atrial fibrillation  - VHD  SP  AVR  No issues                The patient does not have cardiac complaints    Gisselle Colón has the following history recorded in care path:  Patient Active Problem List    Diagnosis Date Noted    Typical atrial flutter ([facility]      Priority: High    Syncope and collapse 12/14/2017     Priority: Low    ACS (acute coronary syndrome) ([facility] 12/14/2017     Priority: Low    Atrial fibrillation ([facility]     Diabetes mellitus ([facility]     Hyperlipidemia     Hypertension     History of benign prostatic hyperplasia     Obesity     Hx of colonic polyp     Recurrent incisional hernia 11/07/2018    S/P AVR (aortic valve replacement) 01/04/2018    S/P CABG x 1 01/04/2018    Chronic diastolic heart failure (HCC) 12/20/2017     Current Outpatient Medications   Medication Sig Dispense Refill    metFORMIN (GLUCOPHAGE-XR) 500 MG extended release tablet Take 4 tablets by mouth daily (with breakfast) 360 tablet 1    apixaban (ELIQUIS) 5 MG TABS tablet TAKE 1 TABLET BY MOUTH TWICE A  tablet 1    carvedilol (COREG) 6.25 MG tablet Take 1 tablet by mouth 2 times daily 180 tablet 1    atorvastatin (LIPITOR) 10 MG tablet TAKE 1 TABLET BY MOUTH EVERY DAY 90 tablet 1    aspirin 81 MG EC tablet Take 1 tablet by mouth daily 90 tablet 1    Melatonin 5 MG - VHD  SP  AVR  stable    -   DIABETES MELLITUS: Available pertinent lab data reviewed   and  patient was given dietary advice . Advised to check blood glucose level on a regular basis. -   Changes  in medicines made: No           -  LIPID MANAGEMENT:  Available lipid  lab data reviewed  and patient was given dietary advice. NCEP- ATP III guidelines reviewed with patient. -   Changes  in medicines made: No                         - PAF in  RRR  On anticoag, can take occasional motrin    I confirm that this visit was completed in a telehealth setting ,using synchronous audiovisual technology for real time patient interaction . The patient identity with name and date of birth was confirmed . This evaluation of patient was done by telehealth in the setting of [id number]-93 Public health emergency , which precluded assurance of safe in person visit at the time of service. The patient consented to and accepts potential risks associated with telemedical evaluation and care was taken to assess corine presence of any medical issues that would be more  appropriate for expedited in -person care. Pursuant to the emergency declaration under the [address] [place] [address], [facility] waiver authority and the [employer] Act, this Virtual  Visit was conducted, with patient's consent, to reduce the patient's risk of exposure to COVID-19 and provide continuity of care for an established patient. Services were provided through a video synchronous discussion virtually to substitute for in-person clinic visit. I Confirm this is a Patient Initiated Episode with an Established Patient who has not had a related appointment within my department in the past 7 days or scheduled within the next 24 hours. The call was not tied to face to face office visit or procedure that has occurred in in prior 7 days.   Based on our conversation /evaluation , a subsequent office visit for the patient's problem is not indicated for  24 hrs      Time spent; 25 m  Location; [facility], [address], [address] [facility]  Office staff melody MEJIA were utilised   erick Billy MA  [facility]

## 2020-05-30 LAB
ESTIMATED AVERAGE GLUCOSE: 137 MG/DL
HBA1C MFR BLD: 6.4 %

## 2020-06-09 ENCOUNTER — TELEMEDICINE (OUTPATIENT)
Dept: FAMILY MEDICINE CLINIC | Age: 65
End: 2020-06-09
Payer: MEDICARE

## 2020-06-09 PROBLEM — K43.2 RECURRENT INCISIONAL HERNIA: Status: RESOLVED | Noted: 2018-11-07 | Resolved: 2020-06-09

## 2020-06-09 PROBLEM — R39.12 BENIGN PROSTATIC HYPERPLASIA WITH WEAK URINARY STREAM: Chronic | Status: ACTIVE | Noted: 2020-06-09

## 2020-06-09 PROBLEM — I24.9 ACS (ACUTE CORONARY SYNDROME) (HCC): Status: RESOLVED | Noted: 2017-12-14 | Resolved: 2020-06-09

## 2020-06-09 PROBLEM — N40.1 BENIGN PROSTATIC HYPERPLASIA WITH WEAK URINARY STREAM: Chronic | Status: ACTIVE | Noted: 2020-06-09

## 2020-06-09 PROBLEM — R55 SYNCOPE AND COLLAPSE: Status: RESOLVED | Noted: 2017-12-14 | Resolved: 2020-06-09

## 2020-06-09 PROCEDURE — 3044F HG A1C LEVEL LT 7.0%: CPT | Performed by: FAMILY MEDICINE

## 2020-06-09 PROCEDURE — 1123F ACP DISCUSS/DSCN MKR DOCD: CPT | Performed by: FAMILY MEDICINE

## 2020-06-09 PROCEDURE — G8427 DOCREV CUR MEDS BY ELIG CLIN: HCPCS | Performed by: FAMILY MEDICINE

## 2020-06-09 PROCEDURE — 2022F DILAT RTA XM EVC RTNOPTHY: CPT | Performed by: FAMILY MEDICINE

## 2020-06-09 PROCEDURE — 3017F COLORECTAL CA SCREEN DOC REV: CPT | Performed by: FAMILY MEDICINE

## 2020-06-09 PROCEDURE — 99214 OFFICE O/P EST MOD 30 MIN: CPT | Performed by: FAMILY MEDICINE

## 2020-06-09 PROCEDURE — 4040F PNEUMOC VAC/ADMIN/RCVD: CPT | Performed by: FAMILY MEDICINE

## 2020-06-09 RX ORDER — ATORVASTATIN CALCIUM 10 MG/1
TABLET, FILM COATED ORAL
Qty: 90 TABLET | Refills: 1 | Status: SHIPPED | OUTPATIENT
Start: 2020-06-09 | End: 2021-01-04 | Stop reason: SDUPTHER

## 2020-06-09 RX ORDER — CARVEDILOL 6.25 MG/1
6.25 TABLET ORAL 2 TIMES DAILY
Qty: 180 TABLET | Refills: 1 | Status: SHIPPED | OUTPATIENT
Start: 2020-06-09 | End: 2021-01-04 | Stop reason: SDUPTHER

## 2020-06-09 RX ORDER — ASCORBIC ACID 500 MG
1000 TABLET ORAL 2 TIMES DAILY
COMMUNITY
End: 2022-05-03

## 2020-06-09 RX ORDER — METFORMIN HYDROCHLORIDE 500 MG/1
2000 TABLET, EXTENDED RELEASE ORAL
Qty: 360 TABLET | Refills: 1 | Status: SHIPPED | OUTPATIENT
Start: 2020-06-09 | End: 2020-12-28

## 2020-06-09 ASSESSMENT — ENCOUNTER SYMPTOMS
DIARRHEA: 0
SHORTNESS OF BREATH: 0
WHEEZING: 0
SORE THROAT: 0
COUGH: 0
RHINORRHEA: 0
BACK PAIN: 0

## 2020-06-09 ASSESSMENT — PATIENT HEALTH QUESTIONNAIRE - PHQ9
SUM OF ALL RESPONSES TO PHQ QUESTIONS 1-9: 0
SUM OF ALL RESPONSES TO PHQ QUESTIONS 1-9: 0
1. LITTLE INTEREST OR PLEASURE IN DOING THINGS: 0

## 2020-06-09 NOTE — PROGRESS NOTES
Relationship status: Not on file    Intimate partner violence     Fear of current or ex partner: Not on file     Emotionally abused: Not on file     Physically abused: Not on file     Forced sexual activity: Not on file   Other Topics Concern    Not on file   Social History Narrative    Not on file        SURGICAL HISTORY  Past Surgical History:   Procedure Laterality Date    AORTIC VALVE REPLACEMENT  12/15/2017    with cabg x1 svg-pda    APPENDECTOMY  2013    COLONOSCOPY  9/16/16    Diverticulosis, polyp x1    HERNIA REPAIR  2014    with Mesh    HERNIA REPAIR  2018    JOINT REPLACEMENT Left 2014    TKA    KNEE CARTILAGE SURGERY Left     URACHAL CYST INCISION      Partial Bladder removal and Appendectomy    VENTRAL HERNIA REPAIR  4/7/16    laprascopic       CURRENT MEDICATIONS  Current Outpatient Medications   Medication Sig Dispense Refill    vitamin C (ASCORBIC ACID) 500 MG tablet Take 500 mg by mouth daily      metFORMIN (GLUCOPHAGE-XR) 500 MG extended release tablet Take 4 tablets by mouth daily (with breakfast) 360 tablet 1    apixaban (ELIQUIS) 5 MG TABS tablet TAKE 1 TABLET BY MOUTH TWICE A  tablet 1    carvedilol (COREG) 6.25 MG tablet Take 1 tablet by mouth 2 times daily 180 tablet 1    atorvastatin (LIPITOR) 10 MG tablet TAKE 1 TABLET BY MOUTH EVERY DAY 90 tablet 1    aspirin 81 MG EC tablet Take 1 tablet by mouth daily 90 tablet 1    Melatonin 5 MG CAPS Take 1 tablet by mouth nightly as needed       Multiple Vitamins-Minerals (PX SENIOR VITAMIN PO) Take 1 tablet by mouth daily       No current facility-administered medications for this visit. ALLERGIES  No Known Allergies    PHYSICAL EXAM    There were no vitals taken for this visit. Physical Exam    ASSESSMENT and PLAN    Manuel Dee was seen today for 6 month follow-up and medication refill.     Diagnoses and all orders for this visit:    Paroxysmal atrial fibrillation ([address]    Type 2 diabetes mellitus without complication, without long-term current use of insulin (HCC)    Hx of colonic polyp    Hyperlipidemia, unspecified hyperlipidemia type    Essential hypertension    Class 2 severe obesity due to excess calories with serious comorbidity and body mass index (BMI) of 37.0 to 37.9 in adult ([facility]    S/P AVR (aortic valve replacement)    S/P CABG x 1        No follow-ups on file. Electronically signed by Amanda Thompson MD on 6/9/2020    Please note that this chart was generated using dragon dictation software. Although every effort was made to ensure the accuracy of this automated transcription, some errors in transcription may have occurred.

## 2020-06-09 NOTE — PROGRESS NOTES
[x] Oriented to person/place/time [x]Able to follow commands      Eyes:  EOM    []  Normal  [] Abnormal-  Sclera  []  Normal  [] Abnormal -         Discharge []  None visible  [] Abnormal -    HENT:   [x] Normocephalic, atraumatic. [] Abnormal   [] Mouth/Throat: Mucous membranes are moist.     External Ears [x] Normal  [] Abnormal-     Neck: [x] No visualized mass     Pulmonary/Chest: [x] Respiratory effort normal.  [x] No visualized signs of difficulty breathing or respiratory distress        [] Abnormal-      Musculoskeletal:   [] Normal gait with no signs of ataxia         [] Normal range of motion of neck        [] Abnormal-       Neurological:        [x] No Facial Asymmetry (Cranial nerve 7 motor function) (limited exam to video visit)          [] No gaze palsy        [] Abnormal-         Skin:        [x] No significant exanthematous lesions or discoloration noted on facial skin         [] Abnormal-            Psychiatric:       [x] Normal Affect [] No Hallucinations        [] Abnormal-     Other pertinent observable physical exam findings-     Review of recent blood work  A1c 6.4  Lipids in target range  Chemistry panel is normal except for blood sugar 139    ASSESSMENT/PLAN:  1. Paroxysmal atrial fibrillation (HCC)  Continue rate control and anticoagulation    2. Type 2 diabetes mellitus without complication, without long-term current use of insulin (HCC)  Same diet and medications  - Comprehensive Metabolic Panel; Future  - Lipid Panel; Future  - Hemoglobin A1C; Future  - Microalbumin / Creatinine Urine Ratio; Future    3. Hx of colonic polyp  Call GI for follow-up    4. Hyperlipidemia, unspecified hyperlipidemia type      5. Essential hypertension      6. Class 2 severe obesity due to excess calories with serious comorbidity and body mass index (BMI) of 37.0 to 37.9 in adult [facility])  Patient needs to lose about 20 pounds  He pledges to try harder on his diet    7. S/P AVR (aortic valve replacement)      8.

## 2020-06-18 ENCOUNTER — TELEPHONE (OUTPATIENT)
Dept: CARDIOLOGY CLINIC | Age: 65
End: 2020-06-18

## 2020-06-18 NOTE — TELEPHONE ENCOUNTER
Patient is to have a crown and filing 7/10  He is asking if he needs premedicated   Had hx heart valve surgery

## 2020-07-06 ENCOUNTER — TELEPHONE (OUTPATIENT)
Dept: FAMILY MEDICINE CLINIC | Age: 65
End: 2020-07-06

## 2020-10-28 ENCOUNTER — TELEPHONE (OUTPATIENT)
Dept: CARDIOLOGY CLINIC | Age: 65
End: 2020-10-28

## 2020-10-28 NOTE — TELEPHONE ENCOUNTER
Patient called he is having a cyst removed from  His forehead [facility] 11/3  And was asking if we received a cardiac   Clearance request

## 2020-10-28 NOTE — LETTER
[facility]  [address], [address]  [facility], [place] [address]  Phone: [phone]    Fax [phone]                  Thang Real MD, Rizwana Rodriguez MD, Hannah Locke MD, MD Jb Arguello MD Corky Cocker, MD Dyanna Gitelman, TASHAARMINDA Ortiz, TASHAARMINDA Paige, TASHACARLOS Blanc    Cardiac Risk Assessment   10/28/2020        Surgery Date: 11/3/2020  Surgery: Cyst Removal from Forehead  Anesthesia Type: Local  Fax Number: [phone]    To: Dr. Jana Escalante  From : CARLOS Glaser    Patient Name: Zi Candelario     YOB: 1955  MRN: [id number]    Zi Candelario was evaluated from a cardiac standpoint for his surgery or procedure and based on his history, diagnosis, recent cardiac testing, he is considered a low to moderate risk candidate for any elijah-operative cardiac complications. Patients with known CAD with moderate or high risk should have surgical procedures done where they have access to invasive cardiology services if emergently needed. Antiplatelet/anticoagulant therapy can be held prior to the surgery or procedure at the discretion of the surgeon to be resumed as soon as possible if held. Please call with any further questions. Respectfully,     CARLOS Glaser  As/bl  This cardiac clearance is good for 6 months from 10/28/2020 unless new cardiac symptoms arise.

## 2020-12-22 DIAGNOSIS — R39.12 BENIGN PROSTATIC HYPERPLASIA WITH WEAK URINARY STREAM: Chronic | ICD-10-CM

## 2020-12-22 DIAGNOSIS — N40.1 BENIGN PROSTATIC HYPERPLASIA WITH WEAK URINARY STREAM: Chronic | ICD-10-CM

## 2020-12-22 DIAGNOSIS — E11.9 TYPE 2 DIABETES MELLITUS WITHOUT COMPLICATION, WITHOUT LONG-TERM CURRENT USE OF INSULIN (HCC): ICD-10-CM

## 2020-12-22 LAB
A/G RATIO: 1.7 (ref 1.1–2.2)
ALBUMIN SERPL-MCNC: 4.3 G/DL (ref 3.4–5)
ALP BLD-CCNC: 66 U/L (ref 40–129)
ALT SERPL-CCNC: 24 U/L (ref 10–40)
ANION GAP SERPL CALCULATED.3IONS-SCNC: 11 MMOL/L (ref 3–16)
AST SERPL-CCNC: 23 U/L (ref 15–37)
BILIRUB SERPL-MCNC: 0.8 MG/DL (ref 0–1)
BUN BLDV-MCNC: 12 MG/DL (ref 7–20)
CALCIUM SERPL-MCNC: 9.1 MG/DL (ref 8.3–10.6)
CHLORIDE BLD-SCNC: 100 MMOL/L (ref 99–110)
CHOLESTEROL, TOTAL: 125 MG/DL (ref 0–199)
CO2: 26 MMOL/L (ref 21–32)
CREAT SERPL-MCNC: 1 MG/DL (ref 0.8–1.3)
CREATININE URINE: 27.6 MG/DL (ref 39–259)
GFR AFRICAN AMERICAN: >60
GFR NON-AFRICAN AMERICAN: >60
GLOBULIN: 2.6 G/DL
GLUCOSE BLD-MCNC: 163 MG/DL (ref 70–99)
HDLC SERPL-MCNC: 37 MG/DL (ref 40–60)
LDL CHOLESTEROL CALCULATED: 77 MG/DL
MICROALBUMIN UR-MCNC: <1.2 MG/DL
MICROALBUMIN/CREAT UR-RTO: ABNORMAL MG/G (ref 0–30)
POTASSIUM SERPL-SCNC: 4.6 MMOL/L (ref 3.5–5.1)
PROSTATE SPECIFIC ANTIGEN: 0.32 NG/ML (ref 0–4)
SODIUM BLD-SCNC: 137 MMOL/L (ref 136–145)
TOTAL PROTEIN: 6.9 G/DL (ref 6.4–8.2)
TRIGL SERPL-MCNC: 54 MG/DL (ref 0–150)
VLDLC SERPL CALC-MCNC: 11 MG/DL

## 2020-12-23 LAB
ESTIMATED AVERAGE GLUCOSE: 151.3 MG/DL
HBA1C MFR BLD: 6.9 %

## 2020-12-28 RX ORDER — METFORMIN HYDROCHLORIDE 500 MG/1
2000 TABLET, EXTENDED RELEASE ORAL
Qty: 360 TABLET | Refills: 1 | Status: SHIPPED | OUTPATIENT
Start: 2020-12-28 | End: 2021-07-01

## 2021-01-04 ENCOUNTER — OFFICE VISIT (OUTPATIENT)
Dept: FAMILY MEDICINE CLINIC | Age: 66
End: 2021-01-04
Payer: MEDICARE

## 2021-01-04 VITALS
BODY MASS INDEX: 35.56 KG/M2 | WEIGHT: 254 LBS | OXYGEN SATURATION: 97 % | HEART RATE: 100 BPM | SYSTOLIC BLOOD PRESSURE: 135 MMHG | TEMPERATURE: 96.8 F | HEIGHT: 71 IN | DIASTOLIC BLOOD PRESSURE: 90 MMHG

## 2021-01-04 DIAGNOSIS — E11.9 TYPE 2 DIABETES MELLITUS WITHOUT COMPLICATION, WITHOUT LONG-TERM CURRENT USE OF INSULIN (HCC): ICD-10-CM

## 2021-01-04 DIAGNOSIS — I48.0 PAROXYSMAL ATRIAL FIBRILLATION (HCC): ICD-10-CM

## 2021-01-04 DIAGNOSIS — Z95.2 S/P AVR (AORTIC VALVE REPLACEMENT): Primary | ICD-10-CM

## 2021-01-04 DIAGNOSIS — E66.01 CLASS 2 SEVERE OBESITY DUE TO EXCESS CALORIES WITH SERIOUS COMORBIDITY AND BODY MASS INDEX (BMI) OF 37.0 TO 37.9 IN ADULT (HCC): ICD-10-CM

## 2021-01-04 PROCEDURE — G8427 DOCREV CUR MEDS BY ELIG CLIN: HCPCS | Performed by: FAMILY MEDICINE

## 2021-01-04 PROCEDURE — 1036F TOBACCO NON-USER: CPT | Performed by: FAMILY MEDICINE

## 2021-01-04 PROCEDURE — G8482 FLU IMMUNIZE ORDER/ADMIN: HCPCS | Performed by: FAMILY MEDICINE

## 2021-01-04 PROCEDURE — G8417 CALC BMI ABV UP PARAM F/U: HCPCS | Performed by: FAMILY MEDICINE

## 2021-01-04 PROCEDURE — 2022F DILAT RTA XM EVC RTNOPTHY: CPT | Performed by: FAMILY MEDICINE

## 2021-01-04 PROCEDURE — 99214 OFFICE O/P EST MOD 30 MIN: CPT | Performed by: FAMILY MEDICINE

## 2021-01-04 PROCEDURE — 3017F COLORECTAL CA SCREEN DOC REV: CPT | Performed by: FAMILY MEDICINE

## 2021-01-04 PROCEDURE — 1123F ACP DISCUSS/DSCN MKR DOCD: CPT | Performed by: FAMILY MEDICINE

## 2021-01-04 PROCEDURE — 3046F HEMOGLOBIN A1C LEVEL >9.0%: CPT | Performed by: FAMILY MEDICINE

## 2021-01-04 PROCEDURE — 4040F PNEUMOC VAC/ADMIN/RCVD: CPT | Performed by: FAMILY MEDICINE

## 2021-01-04 RX ORDER — ATORVASTATIN CALCIUM 10 MG/1
TABLET, FILM COATED ORAL
Qty: 90 TABLET | Refills: 1 | Status: SHIPPED | OUTPATIENT
Start: 2021-01-04 | End: 2021-07-06 | Stop reason: SDUPTHER

## 2021-01-04 RX ORDER — CARVEDILOL 6.25 MG/1
6.25 TABLET ORAL 2 TIMES DAILY
Qty: 180 TABLET | Refills: 1 | Status: SHIPPED | OUTPATIENT
Start: 2021-01-04 | End: 2021-01-14

## 2021-01-04 RX ORDER — ASPIRIN 81 MG/1
81 TABLET ORAL DAILY
Qty: 90 TABLET | Refills: 1 | Status: SHIPPED | OUTPATIENT
Start: 2021-01-04 | End: 2021-10-18

## 2021-01-04 ASSESSMENT — PATIENT HEALTH QUESTIONNAIRE - PHQ9
SUM OF ALL RESPONSES TO PHQ9 QUESTIONS 1 & 2: 0
SUM OF ALL RESPONSES TO PHQ QUESTIONS 1-9: 0
SUM OF ALL RESPONSES TO PHQ QUESTIONS 1-9: 0
2. FEELING DOWN, DEPRESSED OR HOPELESS: 0

## 2021-01-04 NOTE — PROGRESS NOTES
2021     Yehuda Mckeon      Chief Complaint   Patient presents with    6 Month Follow-Up       HPI      Efren Arroyo is a 76 y.o. male who presents today with the followin. S/P AVR (aortic valve replacement)    2. Class 2 severe obesity due to excess calories with serious comorbidity and body mass index (BMI) of 37.0 to 37.9 in adult ([address]    3. Type 2 diabetes mellitus without complication, without long-term current use of insulin (HCC)    4. Paroxysmal atrial fibrillation (HCC)      Multiple issues for review today  The patient is diabetic  He is gained about 10 pounds over the past 6 months  REVIEW OF SYMPTOMS    Review of Systems   Constitutional: Positive for unexpected weight change. He has been less active because of Covid restrictions  Has gained about 10 pounds   Cardiovascular:        History of aortic valve replacement  He has chronic atrial fibrillation  He is on rate control and anticoagulation  He has a follow-up with cardiology next week   Gastrointestinal:        History of colon polyp  He is under appropriate surveillance   Endocrine:        Diabetes type 2 reasonably well-controlled that he is try to watch his diet he is gained some weight  He has not had any hypoglycemia       PAST MEDICAL HISTORY  Past Medical History:   Diagnosis Date    Arthritis     Knees, Feet    Atrial fibrillation (HCC)     Back pain     Lower Back    CAD (coronary artery disease)     Diabetes mellitus ([address]     H/O echocardiogram 2017    EF 50-55%.  severe aortic stenosis is present, mod AR, mild MR and TR    History of benign prostatic hyperplasia     History of cardiac cath 2017    Severe AS, PDA MID 80% disease, RCA 50% distal stenosis, AVR + CABG VS TAVR and PCI, CTS CONS    History of echocardiogram 2019    EF 55-60%, Mildly dilated left atrium, Bioprosthetic aortic valve noted with increased velocities indicating stenosis, Mild paravalvular leak, Mitral annular calcification abused: None     Forced sexual activity: None   Other Topics Concern    None   Social History Narrative    None        SURGICAL HISTORY  Past Surgical History:   Procedure Laterality Date    AORTIC VALVE REPLACEMENT  12/15/2017    with cabg x1 svg-pda    APPENDECTOMY  2013    COLONOSCOPY  9/16/16    Diverticulosis, polyp x1    HERNIA REPAIR  2014    with Mesh    HERNIA REPAIR  2018    JOINT REPLACEMENT Left 2014    TKA    KNEE CARTILAGE SURGERY Left     URACHAL CYST INCISION      Partial Bladder removal and Appendectomy    VENTRAL HERNIA REPAIR  4/7/16    laprascopic       CURRENT MEDICATIONS  Current Outpatient Medications   Medication Sig Dispense Refill    apixaban (ELIQUIS) 5 MG TABS tablet TAKE 1 TABLET BY MOUTH TWICE A  tablet 1    aspirin 81 MG EC tablet Take 1 tablet by mouth daily 90 tablet 1    atorvastatin (LIPITOR) 10 MG tablet TAKE 1 TABLET BY MOUTH EVERY DAY 90 tablet 1    carvedilol (COREG) 6.25 MG tablet Take 1 tablet by mouth 2 times daily 180 tablet 1    metFORMIN (GLUCOPHAGE-XR) 500 MG extended release tablet TAKE 4 TABLETS BY MOUTH DAILY (WITH BREAKFAST) 360 tablet 1    vitamin C (ASCORBIC ACID) 500 MG tablet Take 500 mg by mouth daily      Melatonin 5 MG CAPS Take 1 tablet by mouth nightly as needed       Multiple Vitamins-Minerals (PX SENIOR VITAMIN PO) Take 1 tablet by mouth daily       No current facility-administered medications for this visit. ALLERGIES  No Known Allergies    PHYSICAL EXAM    BP (!) 135/90 (Site: Left Upper Arm, Position: Sitting, Cuff Size: Medium Adult)   Pulse 100   Temp 96.8 °F (36 °C) (Temporal)   Ht 5' 11\" (1.803 m)   Wt 254 lb (115.2 kg)   SpO2 97%   BMI 35.43 kg/m²     Physical Exam  Vitals signs and nursing note reviewed. Constitutional:       Appearance: Normal appearance. Cardiovascular:      Rate and Rhythm: Normal rate. Pulmonary:      Effort: Pulmonary effort is normal. No respiratory distress.    Neurological: Mental Status: He is alert. Labs reviewed  A1c 6.9  Lipids are in target range  Chemistry profile okay  Microalbumin negative    Immunizations reviewed      ASSESSMENT and PLAN    Haylee Armijo was seen today for 6 month follow-up. Diagnoses and all orders for this visit:    S/P AVR (aortic valve replacement)    Class 2 severe obesity due to excess calories with serious comorbidity and body mass index (BMI) of 37.0 to 37.9 in adult [facility])    Type 2 diabetes mellitus without complication, without long-term current use of insulin ([facility]  -     Comprehensive Metabolic Panel; Future  -     Hemoglobin A1C; Future  -     Lipid Panel; Future    Paroxysmal atrial fibrillation (HCC)    Other orders  -     apixaban (ELIQUIS) 5 MG TABS tablet; TAKE 1 TABLET BY MOUTH TWICE A DAY  -     aspirin 81 MG EC tablet; Take 1 tablet by mouth daily  -     atorvastatin (LIPITOR) 10 MG tablet; TAKE 1 TABLET BY MOUTH EVERY DAY  -     carvedilol (COREG) 6.25 MG tablet; Take 1 tablet by mouth 2 times daily    Patient needs to lose weight  Continue same prescription medications  Get Covid vaccine when available  Follow-up in 6 months get labs prior to that office visit    Return in about 6 months (around 7/4/2021). Electronically signed by Ravi Edwards MD on 1/4/2021    Please note that this chart was generated using dragon dictation software. Although every effort was made to ensure the accuracy of this automated transcription, some errors in transcription may have occurred.

## 2021-01-14 ENCOUNTER — OFFICE VISIT (OUTPATIENT)
Dept: CARDIOLOGY CLINIC | Age: 66
End: 2021-01-14
Payer: MEDICARE

## 2021-01-14 VITALS
TEMPERATURE: 97.2 F | BODY MASS INDEX: 36.48 KG/M2 | RESPIRATION RATE: 18 BRPM | DIASTOLIC BLOOD PRESSURE: 70 MMHG | HEART RATE: 107 BPM | WEIGHT: 254.8 LBS | HEIGHT: 70 IN | SYSTOLIC BLOOD PRESSURE: 108 MMHG

## 2021-01-14 DIAGNOSIS — I10 ESSENTIAL HYPERTENSION: ICD-10-CM

## 2021-01-14 DIAGNOSIS — E78.5 HYPERLIPIDEMIA, UNSPECIFIED HYPERLIPIDEMIA TYPE: ICD-10-CM

## 2021-01-14 DIAGNOSIS — I38 VHD (VALVULAR HEART DISEASE): ICD-10-CM

## 2021-01-14 DIAGNOSIS — I25.10 CORONARY ARTERY DISEASE INVOLVING NATIVE CORONARY ARTERY OF NATIVE HEART WITHOUT ANGINA PECTORIS: Primary | ICD-10-CM

## 2021-01-14 DIAGNOSIS — I48.0 PAROXYSMAL ATRIAL FIBRILLATION (HCC): ICD-10-CM

## 2021-01-14 PROCEDURE — 1123F ACP DISCUSS/DSCN MKR DOCD: CPT | Performed by: NURSE PRACTITIONER

## 2021-01-14 PROCEDURE — 1036F TOBACCO NON-USER: CPT | Performed by: NURSE PRACTITIONER

## 2021-01-14 PROCEDURE — G8482 FLU IMMUNIZE ORDER/ADMIN: HCPCS | Performed by: NURSE PRACTITIONER

## 2021-01-14 PROCEDURE — G8428 CUR MEDS NOT DOCUMENT: HCPCS | Performed by: NURSE PRACTITIONER

## 2021-01-14 PROCEDURE — 3017F COLORECTAL CA SCREEN DOC REV: CPT | Performed by: NURSE PRACTITIONER

## 2021-01-14 PROCEDURE — G8417 CALC BMI ABV UP PARAM F/U: HCPCS | Performed by: NURSE PRACTITIONER

## 2021-01-14 PROCEDURE — 93000 ELECTROCARDIOGRAM COMPLETE: CPT | Performed by: NURSE PRACTITIONER

## 2021-01-14 PROCEDURE — 99214 OFFICE O/P EST MOD 30 MIN: CPT | Performed by: NURSE PRACTITIONER

## 2021-01-14 PROCEDURE — 4040F PNEUMOC VAC/ADMIN/RCVD: CPT | Performed by: NURSE PRACTITIONER

## 2021-01-14 RX ORDER — CARVEDILOL 12.5 MG/1
12.5 TABLET ORAL 2 TIMES DAILY
Qty: 180 TABLET | Refills: 1 | Status: SHIPPED | OUTPATIENT
Start: 2021-01-14 | End: 2021-02-22

## 2021-01-14 NOTE — PATIENT INSTRUCTIONS
Please be informed that if you contact our office outside of normal business hours the physician on call cannot help with any scheduling or rescheduling issues, procedure instruction questions or any type of medication issue. We advise you for any urgent/emergency that you go to the nearest emergency room!     PLEASE CALL OUR OFFICE DURING NORMAL BUSINESS HOURS    Monday - Friday   8 am to 5 pm    [place]: [place] [address]: [phone]    [place]:  [phone]

## 2021-01-14 NOTE — PROGRESS NOTES
Jacobo Dolan  [birth year]  [id number]    Have you had any Chest Pain that is not new? - No     Have you had any Shortness of Breath - No    Have you had any dizziness - No    Have you had any palpitations that are not new?  - No    Do you have any edema - swelling in No      Do you have a surgery or procedure scheduled in the near future - No     Ask patient if they want to sign up for MyChart if they are not already signed up     Check to see if we have an E-MAIL on file for the patient     Check medication list thoroughly!!! AND RECONCILE OUTSIDE MEDICATIONS  If dose has changed change the entire order not just the MG  BE SURE TO ASK PATIENT IF THEY NEED MEDICATION REFILLS     At check out add to every patient's \"wrap up\" the following dot phrase AFTERHOURSEDUCATION and ensure we explain this to our patients

## 2021-01-14 NOTE — PROGRESS NOTES
1/14/2021  Primary cardiologist: Dr. Ozzy Rachel  is a  Established patient, 76 y.o.   male here for evaluation of the following chief complaint(s): CAD VHD Atrial fib HTN HLD      SUBJECTIVE/OBJECTIVE:  HPI : h/o    1. Coronary artery disease involving native coronary artery of native heart without angina pectoris    2. VHD (valvular heart disease)    3. Paroxysmal atrial fibrillation (HCC)    4. Essential hypertension    5. Hyperlipidemia, unspecified hyperlipidemia type        [facility]. He denies any cardiac symptoms. He notes he has consumed more alcohol and caffeine over the past few months. He is active. Vitals:    01/14/21 1107   BP: 108/70   Site: Right Upper Arm   Position: Sitting   Cuff Size: Medium Adult   Pulse: 107   Resp: 18   Temp: 97.2 °F (36.2 °C)   Weight: 254 lb 12.8 oz (115.6 kg)   Height: 5' 10\" (1.778 m)     /70 (Site: Right Upper Arm, Position: Sitting, Cuff Size: Medium Adult)   Pulse 107   Temp 97.2 °F (36.2 °C)   Resp 18   Ht 5' 10\" (1.778 m)   Wt 254 lb 12.8 oz (115.6 kg)   BMI 36.56 kg/m²   Patient-Reported Vitals 6/9/2020   Patient-Reported Weight 247lb   Patient-Reported Height 71\"   Patient-Reported Systolic 185   Patient-Reported Diastolic 80   Patient-Reported Pulse 64     Wt Readings from Last 3 Encounters:   01/14/21 254 lb 12.8 oz (115.6 kg)   01/04/21 254 lb (115.2 kg)   05/29/20 247 lb (112 kg)     Body mass index is 36.56 kg/m². Physical Exam :     Neck: JVD  absent    Respiratory:  Normal breath sounds, No respiratory distress, No wheezing, No chest tenderness.      Cardiovascular:  S1 S2 appreciated, IRRR, systolic murmur appreciated, No rubs appreciated, No gallops appreciated, JVD not appreciated    Extremities:  No edema to lower legs     All pertinent data reviewed and discussed with patient including:  Transthoracic echocardiogram : 02/25/2019   Normal left ventricle structure and systolic function with an ejection fraction of 55-60%. Mildly dilated left atrium. Bioprosthetic aortic valve noted with increased velocities indicating   stenosis, mean gradient of 41 mmHg which is elevated compared to 1/18/19   which was a mean of 20 mmHg. Mild paravalvular leak noted. Mitral annular calcification noted without evidence of stenosis. Normal pulmonary artery pressure. No evidence of pericardial effusion. [facility]: 2017  SEVERE AS    PDA MID 80% DISEASE RCA 50% DISTAL STENOSIS      ASSESSMENT/PLAN:    1. Coronary artery disease involving native coronary artery of native heart without angina pectoris  Please stable. He denies any symptoms from his heart disease. Discussed lifestyle management with risk factor modification. Encourage compliance with diet and exercise. Will increase carvedilol to 12.5 mg twice daily. 2. VHD (valvular heart disease)  History of aortic valve replacement-echocardiogram February 2019 showed bioprosthetic aortic valve noted with increased velocities indicating possible stenosis. Mean gradient was noted to be elevated as well. Recommend a recheck echocardiogram- today patient is in atrial flutter with a rapid rate which will not give true values. Recommend to recheck echocardiogram prior to next office visit    3. Paroxysmal atrial fibrillation (HCC)  History of PAF and cardioversion. Today's EKG shows atrial flutter. Patient denies any symptoms. He states that he has indulged in alcohol and caffeine over the last few months. Offered patient cardioversion and he has declined. Offered patient consult for electrophysiology for further evaluation and he has declined at this time. Will increase carvedilol to 12.5 mg twice daily. Continue anticoagulation. Patient currently on Eliquis 5 mg twice daily. Stop caffeine and alcohol. Will repeat ekg in 1 month  Coreg increased to 12.5 mg bid      4. Essential hypertension  Controlled    5.  Hyperlipidemia, unspecified hyperlipidemia type  At goal   Continue with statin  Low fat low cholesterol diet  exercise    Medications reviewed and confirmed with patient     Tests ordered:    EKG  OV in 1 mo for EKG/ BP  OV 3 months   Signed:  Tariq Martínez, 1/14/2021, 11:41 AM    An electronic signature was used to authenticate this note.

## 2021-02-22 ENCOUNTER — TELEPHONE (OUTPATIENT)
Dept: CARDIOLOGY CLINIC | Age: 66
End: 2021-02-22

## 2021-02-22 ENCOUNTER — NURSE ONLY (OUTPATIENT)
Dept: CARDIOLOGY CLINIC | Age: 66
End: 2021-02-22
Payer: MEDICARE

## 2021-02-22 VITALS — HEART RATE: 83 BPM | SYSTOLIC BLOOD PRESSURE: 150 MMHG | DIASTOLIC BLOOD PRESSURE: 90 MMHG | TEMPERATURE: 96.5 F

## 2021-02-22 DIAGNOSIS — Z01.810 PRE-OPERATIVE CARDIOVASCULAR EXAMINATION: Primary | ICD-10-CM

## 2021-02-22 DIAGNOSIS — I48.0 PAROXYSMAL ATRIAL FIBRILLATION (HCC): Primary | ICD-10-CM

## 2021-02-22 PROCEDURE — 93000 ELECTROCARDIOGRAM COMPLETE: CPT | Performed by: NURSE PRACTITIONER

## 2021-02-22 RX ORDER — CARVEDILOL 25 MG/1
25 TABLET ORAL 2 TIMES DAILY
Qty: 180 TABLET | Refills: 3 | Status: SHIPPED | OUTPATIENT
Start: 2021-02-22 | End: 2022-02-25

## 2021-02-22 NOTE — PROGRESS NOTES
Here for EKG    Last visit noted to be in atrial flutter  Increase carvedilol to 12.5 mg twice daily encourage patient to stop alcohol and caffeine use EKG today shows atrial flutter rate 83-better controlled  Increase carvedilol to 25 mg twice daily  For patient appointment with EP-declined  He would like to have cardioversion -we will arrange after discussing with Dr Robert Silva with Eliquis 5 mg twice daily    Vitals:    02/22/21 1242   BP: (!) 150/90   Pulse: 83   Temp: 96.5 °F (35.8 °C)

## 2021-02-22 NOTE — TELEPHONE ENCOUNTER
Cardioversion scheduled 3/9/21 @ 11:00 with arrival time of 9:30. Patient to come to office 3/3/21 9:30 for Covid test and sign and go over paperwork for Cardioversion. Patient acknowledged understanding.

## 2021-02-22 NOTE — LETTER
[facility] [place]     Dr. Margaret Meckel     Cardioversion    Patient Name: Jennifer Rivero   : 1955   MRN# [id number]     Date of Procedure: 3/9/21 Time: 11:00 Arrival Time: 9:30     Hospital: [facility])     Call to Pre-[place] at [phone] 2 days before your procedure    Please have blood work and chest-x-ray done 1 to 2 days before procedure at [facility]. X Please do not have anything by mouth after midnight prior to or 8 hours before the procedure. X You may take your medication with a sip of water unless advised otherwise below. X  Please continue to take Eliquis (apixaban) as directed. X Please have COVID-19 Test done on 3/3/21. You will need to Quarantine yourself until procedure. Patient Signature:____________________________ Staff Signature: Va Gordon  What is cardioversion? Cardioversion helps your heart return to a normal rhythm. It treats problems like atrial fibrillation. It is also sometimes used in emergencies. It can correct a fast heartbeat that causes low blood pressure, chest pain, or heart failure. Your doctor may ask you to take medicines before the treatment. These help prevent blood clots. Your doctor will watch you closely to make sure that there are no problems. Electrical cardioversion: The electrical procedure is done in a hospital. You will get medicine to help you relax and control the pain. Your doctor will put paddles or patches on your chest and back. These send an electric current to your heart. This resets your heart rhythm. The electrical part takes about 5 minutes. But you will probably be in the hospital for 1 to 2 hours. You will need to recover from the effects of the pain medicine.   Chemical cardioversion: The chemical procedure is most often done in a hospital. In most cases, the medicine is put into your arm through a tube called an IV. But you may get medicines to take by mouth. You may feel a quick sting or pinch when the IV starts. The procedure usually takes about 30 to 45 minutes. Transesophageal Echocardiogram  What is a transesophageal echocardiogram?  A transesophageal echocardiogram is a test to help your doctor look at the inside of your heart. A small device called a transducer directs sound waves toward your heart. The sound waves make a picture of the heart's valves and chambers. Your doctor may do this test to look for certain types of heart disease. Or it may be done to see how disease is affecting your heart. You will be given medicine to make you sleepy and comfortable during the test. The doctor puts a small, flexible tube into your throat and guides it to the esophagus. This is the tube that connects your mouth to your stomach. The doctor will ask you to swallow as the tube goes down. The transducer is at the tip of the tube. It gets close to your heart to make clear pictures. The doctor will look at the ultrasound pictures on a screen. You will not be able to eat or drink until the numbness from the throat spray wears off. Your throat may be sore for a few days after the test.  Follow-up care is a key part of your treatment and safety. Be sure to make and go to all appointments, and call your doctor if you are having problems. It's also a good idea to know your test results and keep a list of the medicines you take. Dalia Sprawls Dr. Margaret Meckel            Patient Name: Jennifer Rivero   : 1955  MRN# [id number]  TODAYS DATE: 21    DATE OF PROCEDURE: 3/9/21    DX: A-Fib           X  Cardioversion     X INR    X BMP   ? PLEASE CALL ABNORMAL RESULTS TO THE  PHYSICIAN? ATTENTION PATIENT: Pretesting is to be done before the cath.  You do not have to fast for the lab work. You must go to the [facility]    behind the former [facility] at [address]. [facility]. to have this lab work done. Phone: [phone] Hours: 7:00 am to 5:00 pm Monday  Friday       PHYSICIANS SIGNATURE ________________________ DATE:_________________TIME:___________________                                                                           [facility] ([facility]) Informed Consent for Anesthesia/Sedation, Surgery, Invasive Procedures, and other High-risk Interventions and Medication use      *This consent is applicable for 30 days following patient signature*    Procedure(s)   Marquis Lokesh LAY authorize, Dr. Jessica Castrejon    and the associate(s) or assistant(s) of his/her choice, to perform the following procedure(s): Cardioversion   I know that unexpected conditions may require additional or different procedures than those above. I authorize the above named practitioner(s) perform these as necessary and desirable. This is based on the practitioners professional judgment. The above named practitioner has discussed the above procedure(s) with me, including:   Potential benefits, including likelihood of success of the procedure(s) goals   Risks   Side effects, risk of death, and risk of infection   Any potential problems that might occur during recuperation or healing post-procedure   Reasonable alternatives   Risks of NOT performing the procedure(s)    I acknowledge that no warranty or guarantee has been made to the results the procedure(s). I consent to the above named practitioner(s) providing additional services to me as deemed reasonable and necessary, including but not limited to:     Use of medications for anesthesia or sedation.  All anesthesia and sedation carry risks. My practitioner has discussed my anticipated anesthesia and/or sedation and the risks of using, risk of not using, benefits, side effects, and alternatives.        Use of pathology  - I authorize [facility]) to dispose of tissues, specimens or organs when pathology is complete.  Use of radiology  - A contrast agent may be required for radiology procedures. My practitioner has advised me of the risks of using, risks of not using, benefits, side effects, and alternatives.  Observers or use of photography, video/audio recording, or televising of the procedure(s). This is for medical, scientific, or educational purposes. This includes appropriate portions of my body. My identity will not be revealed.  I consent to release of my social security number and other identifying information to [employer] (FDA), and the supplier/, if I receive tissue, a device, or implant. This is to track the tissue, device, or implant for defect, recall, infection, etc.      Use of blood and/or blood products, if needed, through my hospital stay. My practitioner has advised me of the risks of using, risks of not using, benefits, side effects, and alternatives. ___ I do NOT want Blood or Blood products given. (Complete separate  refusal form)                      Code Status (yfn one):  ___ I do NOT HAVE a DNR order. I am a Full-code.   I will receive CPR, intubation,  chest compressions, medications, and/or other life saving measures if I have a  cardiac or respiratory arrest.    ___ I have a Do Not Resuscitate (DNR)order.   (yfn one below)  ___  I rescind my DNR for surgery and immediate post-operative period through Phase 2 recovery. This means, for that time period, I will be a Full-code and receive CPR, intubation, chest compressions, medications, and/or other life saving measures, if I have a cardiac or respiratory arrest.    ___ I WANT to keep my DNR in effect during my procedure(s) and immediate post-operative recovery period through Phase 2 recovery.   (Complete separate refusal form)     This form has been fully explained to me. I understand its contents. Patients Signature: ___________________________Date: ________  Time: ________    If patient unable to sign, has engaged the [address] [place] [address], is a minor, or has a court-appointed Guardian:  [facility] Representative Name (Print):  ____________________________________      Relationship (Secor one):    Guardian   Parent    Spouse    HCPOA   Child   Sibling  Next-of-Kin Friend    Patients Representative Signature: _______________________________________              Date: ______________  Time: __________    An  was used.    name/ID: _________________________________      [facility] ([facility]) Witness________________________  Date: ________   Time: _________    Physician/Practitioner _______________________  Date: ________   Time: _________           Revision 2017                                                  Guy Luna       PROCEDURE TO SCHEDULE:    Cardioversion    Patient Name: Brendan Bartholomew   : 1955   MRN# [id number]    Home Phone Number: [phone]   Weight:    Wt Readings from Last 3 Encounters:   21 254 lb 12.8 oz (115.6 kg)   21 254 lb (115.2 kg)   20 247 lb (112 kg)        Insurance: Payor: Bri Arguelles / Plan: [facility] MEDICARE COMPLETE / Product Type: *No Product type* /     Date of Procedure: 3/9/21  Time: 11:00 Arrival Time: 9:30    Diagnosis:  A-Fib  Allergies: No Known Allergies       1) Call [facility] scheduling [phone]) or Instant Message  CONFIRMED WITH     PHONE OR   INSTANT MESSAGE  2) PREAUTHORIZATION NUMBER:    Spoke to:      From date:     expiration date:

## 2021-03-03 ENCOUNTER — NURSE ONLY (OUTPATIENT)
Dept: CARDIOLOGY CLINIC | Age: 66
End: 2021-03-03
Payer: MEDICARE

## 2021-03-03 ENCOUNTER — HOSPITAL ENCOUNTER (OUTPATIENT)
Age: 66
Setting detail: SPECIMEN
Discharge: HOME | End: 2021-03-03
Payer: MEDICARE

## 2021-03-03 VITALS — TEMPERATURE: 96.9 F

## 2021-03-03 DIAGNOSIS — Z01.810 PRE-OPERATIVE CARDIOVASCULAR EXAMINATION: ICD-10-CM

## 2021-03-03 LAB
ANION GAP SERPL CALCULATED.3IONS-SCNC: 12 MMOL/L (ref 3–16)
BUN BLDV-MCNC: 26 MG/DL (ref 7–20)
CALCIUM SERPL-MCNC: 9.4 MG/DL (ref 8.3–10.6)
CHLORIDE BLD-SCNC: 106 MMOL/L (ref 99–110)
CO2: 23 MMOL/L (ref 21–32)
CREAT SERPL-MCNC: 0.8 MG/DL (ref 0.8–1.3)
GFR AFRICAN AMERICAN: >60
GFR NON-AFRICAN AMERICAN: >60
GLUCOSE BLD-MCNC: 117 MG/DL (ref 70–99)
INR BLD: 1.35 (ref 0.86–1.14)
POTASSIUM SERPL-SCNC: 4.7 MMOL/L (ref 3.5–5.1)
PROTHROMBIN TIME: 15.7 SEC (ref 10–13.2)
SODIUM BLD-SCNC: 141 MMOL/L (ref 136–145)

## 2021-03-03 PROCEDURE — U0002 COVID-19 LAB TEST NON-CDC: HCPCS

## 2021-03-03 PROCEDURE — 99211 OFF/OP EST MAY X REQ PHY/QHP: CPT | Performed by: INTERNAL MEDICINE

## 2021-03-03 NOTE — PROGRESS NOTES
Pt here in office & educated on CV for Dx: AFIB, scheduled for 3/9/11 @ 11, w/arrival @ 80, @ [facility]; risks explained; & consents signed. Pre-admission orders given to pt for labs & CXR, which are due TODAY @ [facility]. Instructions given to pt to:  NPO after midnight night before procedure; Call hospital @ [phone] to pre-register; May take morning meds the am of procedure. Patient was notified that procedure could be delayed due to an emergency. Patient voiced understanding. Copies of consent, pre-testing orders, & info. sheet scanned into media.

## 2021-03-03 NOTE — PROGRESS NOTES
Patient performed COVID nasal swab for 10 seconds in each nostril. Patient was wearing a mask and provided gloves and tissues. This CMA, LPN, RN present in the room, 6 feet away. Patient dropped swab in  labeled collection tube. Lab order, facesheet and copy of insurance card placed in collection bag. Bag placed in biohazard bag and placed in fridge.  called for .

## 2021-03-04 LAB
SARS-COV-2: NOT DETECTED
SOURCE: NORMAL

## 2021-03-09 ENCOUNTER — ANESTHESIA (OUTPATIENT)
Dept: NON INVASIVE DIAGNOSTICS | Age: 66
End: 2021-03-09

## 2021-03-09 ENCOUNTER — HOSPITAL ENCOUNTER (OUTPATIENT)
Dept: NON INVASIVE DIAGNOSTICS | Age: 66
Discharge: HOME | End: 2021-03-09
Payer: MEDICARE

## 2021-03-09 ENCOUNTER — ANESTHESIA EVENT (OUTPATIENT)
Dept: NON INVASIVE DIAGNOSTICS | Age: 66
End: 2021-03-09

## 2021-03-09 VITALS
SYSTOLIC BLOOD PRESSURE: 117 MMHG | DIASTOLIC BLOOD PRESSURE: 78 MMHG | OXYGEN SATURATION: 94 % | HEART RATE: 88 BPM | RESPIRATION RATE: 22 BRPM

## 2021-03-09 LAB
EKG ATRIAL RATE: 122 BPM
EKG ATRIAL RATE: 80 BPM
EKG DIAGNOSIS: NORMAL
EKG DIAGNOSIS: NORMAL
EKG P AXIS: 238 DEGREES
EKG P AXIS: 60 DEGREES
EKG P-R INTERVAL: 198 MS
EKG P-R INTERVAL: 216 MS
EKG Q-T INTERVAL: 298 MS
EKG Q-T INTERVAL: 400 MS
EKG QRS DURATION: 104 MS
EKG QRS DURATION: 96 MS
EKG QTC CALCULATION (BAZETT): 424 MS
EKG QTC CALCULATION (BAZETT): 461 MS
EKG R AXIS: 49 DEGREES
EKG R AXIS: 53 DEGREES
EKG T AXIS: -74 DEGREES
EKG T AXIS: 45 DEGREES
EKG VENTRICULAR RATE: 122 BPM
EKG VENTRICULAR RATE: 80 BPM

## 2021-03-09 PROCEDURE — 93005 ELECTROCARDIOGRAM TRACING: CPT | Performed by: INTERNAL MEDICINE

## 2021-03-09 PROCEDURE — 92960 CARDIOVERSION ELECTRIC EXT: CPT

## 2021-03-09 PROCEDURE — 92960 CARDIOVERSION ELECTRIC EXT: CPT | Performed by: INTERNAL MEDICINE

## 2021-03-09 PROCEDURE — 93010 ELECTROCARDIOGRAM REPORT: CPT | Performed by: INTERNAL MEDICINE

## 2021-03-09 PROCEDURE — 7100000001 HC PACU RECOVERY - ADDTL 15 MIN

## 2021-03-09 PROCEDURE — 7100000000 HC PACU RECOVERY - FIRST 15 MIN

## 2021-03-09 NOTE — OP NOTE
Maximo Obregon MD  FAC                                                                                         Dipl CBNC, CBCCT, NBE, RPVI                                                                                                    CARDIOVESION      Indication: Atrial fibrillation      After obtaining the informed consent pt was sedated  With  versed    . A    200      J synchronised  shock was given. Pt converted to  SR        Pt remained clinically and hemodynamically stable. On eliquis     Cont with present medical therapy.       I:  Successful cardioversion    Plan:  Cont present therapy  F/U in office for ekg

## 2021-03-09 NOTE — ANESTHESIA PRE PROCEDURE
Department of Anesthesiology  Preprocedure Note       Name:  Luis A Thomas   Age:  76 y.o.  :  1955                                          MRN:  [id number]         Date:  3/9/2021      Surgeon: * Surgery not found *    Procedure:     Medications prior to admission:   Prior to Admission medications    Medication Sig Start Date End Date Taking?  Authorizing Provider   carvedilol (COREG) 25 MG tablet Take 1 tablet by mouth 2 times daily 21  Vinson Babinski, APRN - CNP   apixaban (ELIQUIS) 5 MG TABS tablet Take 5 mg by mouth 2 times daily    Historical Provider, MD   ZINC GLUCONATE PO Take 1 tablet by mouth Daily    Historical Provider, MD   aspirin 81 MG EC tablet Take 1 tablet by mouth daily 21  Celina Dozier MD   atorvastatin (LIPITOR) 10 MG tablet TAKE 1 TABLET BY MOUTH EVERY DAY  Patient taking differently: Take 10 mg by mouth nightly  21   Celina Dozier MD   metFORMIN (GLUCOPHAGE-XR) 500 MG extended release tablet TAKE 4 TABLETS BY MOUTH DAILY (WITH BREAKFAST) 20   Celina Dozier MD   vitamin C (ASCORBIC ACID) 500 MG tablet Take 500 mg by mouth daily    Historical Provider, MD   Melatonin 5 MG CAPS Take 1 tablet by mouth nightly as needed     Historical Provider, MD   Multiple Vitamins-Minerals (PX SENIOR VITAMIN PO) Take 1 tablet by mouth daily    Historical Provider, MD       Current medications:    Current Outpatient Medications   Medication Sig Dispense Refill    carvedilol (COREG) 25 MG tablet Take 1 tablet by mouth 2 times daily 180 tablet 3    apixaban (ELIQUIS) 5 MG TABS tablet Take 5 mg by mouth 2 times daily      ZINC GLUCONATE PO Take 1 tablet by mouth Daily      aspirin 81 MG EC tablet Take 1 tablet by mouth daily 90 tablet 1    atorvastatin (LIPITOR) 10 MG tablet TAKE 1 TABLET BY MOUTH EVERY DAY (Patient taking differently: Take 10 mg by mouth nightly ) 90 tablet 1    metFORMIN (GLUCOPHAGE-XR) 500 MG extended release tablet TAKE 4 TABLETS BY MOUTH DAILY (WITH BREAKFAST) 360 tablet 1    vitamin C (ASCORBIC ACID) 500 MG tablet Take 500 mg by mouth daily      Melatonin 5 MG CAPS Take 1 tablet by mouth nightly as needed       Multiple Vitamins-Minerals (PX SENIOR VITAMIN PO) Take 1 tablet by mouth daily       No current facility-administered medications for this encounter. Allergies:  No Known Allergies    Problem List:    Patient Active Problem List   Diagnosis Code    Chronic diastolic heart failure (HCC) I50.32    S/P AVR (aortic valve replacement) Z95.2    S/P CABG x 1 Z95.1    Typical atrial flutter (HCC) I48.3    Atrial fibrillation (HCC) I48.91    Diabetes mellitus (HCC) E11.9    Hyperlipidemia E78.5    Hypertension I10    History of benign prostatic hyperplasia Z87.438    Obesity E66.9    Hx of colonic polyp Z86.010    Benign prostatic hyperplasia with weak urinary stream N40.1, R39.12    VHD (valvular heart disease) I38    CAD (coronary artery disease) I25.10       Past Medical History:        Diagnosis Date    A-fib ([facility])     Arthritis     Knees, Feet    Atrial fibrillation (HCC)     Back pain     Lower Back    CAD (coronary artery disease)     Diabetes mellitus ([address]     H/O echocardiogram 12/14/2017    EF 50-55%. severe aortic stenosis is present, mod AR, mild MR and TR    History of benign prostatic hyperplasia     History of cardiac cath 12/14/2017    Severe AS, PDA MID 80% disease, RCA 50% distal stenosis, AVR + CABG VS TAVR and PCI, CTS CONS    History of echocardiogram 02/25/2019    EF 55-60%, Mildly dilated left atrium, Bioprosthetic aortic valve noted with increased velocities indicating stenosis, Mild paravalvular leak, Mitral annular calcification noted without evidence of stenosis    History of exercise stress test 01/18/2018    treadmill    History of Holter monitoring 02/15/2018    48 hour holter monitor  One short run of afib.     Hx of colonic polyp     Hx of Doppler echocardiogram 2018    EF50-55%,possible stenosis bioprosthetic valve possible perivalval leak,    Hyperlipidemia     Hypertension     Murmur, cardiac     Obesity     S/P AVR 12/15/2017    with cabg x1 svg-pda       Past Surgical History:        Procedure Laterality Date    AORTIC VALVE REPLACEMENT  12/15/2017    with cabg x1 svg-pda    APPENDECTOMY  2013    COLONOSCOPY  16    Diverticulosis, polyp x1    HERNIA REPAIR  2014    with Mesh    HERNIA REPAIR  2018    JOINT REPLACEMENT Left 2014    TKA    KNEE CARTILAGE SURGERY Left     URACHAL CYST INCISION      Partial Bladder removal and Appendectomy    VENTRAL HERNIA REPAIR  16    laprascopic       Social History:    Social History     Tobacco Use    Smoking status: Former Smoker     Packs/day: 1.00     Years: 24.00     Pack years: 24.00     Start date:      Quit date:      Years since quittin.2    Smokeless tobacco: Never Used   Substance Use Topics    Alcohol use: Yes     Alcohol/week: 12.0 standard drinks     Types: 12 Cans of beer per week                                Counseling given: Not Answered      Vital Signs (Current): There were no vitals filed for this visit.                                            BP Readings from Last 3 Encounters:   21 (!) 150/90   21 108/70   21 (!) 135/90       NPO Status:                                                                                 BMI:   Wt Readings from Last 3 Encounters:   21 254 lb 12.8 oz (115.6 kg)   21 254 lb (115.2 kg)   20 247 lb (112 kg)     There is no height or weight on file to calculate BMI.    CBC:   Lab Results   Component Value Date    WBC 7.8 2019    RBC 4.71 2019    HGB 15.1 2019    HCT 44.0 2019    MCV 93.3 2019    RDW 13.0 2019     2019       CMP:   Lab Results   Component Value Date     2021    K 4.7 2021     2021    CO2 23 2021    BUN 26 03/03/2021    CREATININE 0.8 03/03/2021    GFRAA >60 03/03/2021    AGRATIO 1.7 12/22/2020    LABGLOM >60 03/03/2021    GLUCOSE 117 03/03/2021    PROT 6.9 12/22/2020    PROT 7.6 02/26/2013    CALCIUM 9.4 03/03/2021    BILITOT 0.8 12/22/2020    ALKPHOS 66 12/22/2020    AST 23 12/22/2020    ALT 24 12/22/2020       POC Tests: No results for input(s): POCGLU, POCNA, POCK, POCCL, POCBUN, POCHEMO, POCHCT in the last 72 hours. Coags:   Lab Results   Component Value Date    PROTIME 15.7 03/03/2021    INR 1.35 03/03/2021    APTT 27.1 12/15/2017       HCG (If Applicable): No results found for: PREGTESTUR, PREGSERUM, HCG, HCGQUANT     ABGs:   Lab Results   Component Value Date    PO2ART 77 12/15/2017    IJX2YET 38.0 12/15/2017    SUW6FKZ 23.5 12/15/2017        Type & Screen (If Applicable):  No results found for: LABABO, LABRH    Drug/Infectious Status (If Applicable):  No results found for: HIV, HEPCAB    COVID-19 Screening (If Applicable):   Lab Results   Component Value Date    COVID19 NOT DETECTED 03/03/2021         Anesthesia Evaluation    Airway:         Dental:          Pulmonary:                              Cardiovascular:    (+) hypertension:, valvular problems/murmurs: AS, CAD:, CABG/stent:, dysrhythmias: atrial fibrillation, hyperlipidemia               ROS comment: S/P AVR    Echo 2/2019    Summary   Normal left ventricle structure and systolic function with an ejection   fraction of 55-60%. Mildly dilated left atrium. Bioprosthetic aortic valve noted with increased velocities indicating   stenosis, mean gradient of 41 mmHg which is elevated compared to 1/18/19   which was a mean of 20 mmHg. Mild paravalvular leak noted. Mitral annular calcification noted without evidence of stenosis. Normal pulmonary artery pressure. No evidence of pericardial effusion. Neuro/Psych:               GI/Hepatic/Renal:             Endo/Other:    (+) Diabetes, .                  Abdominal:           Vascular: Anesthesia Plan      MAC     ASA 4       Induction: intravenous.                           TASHA Hobson - CRNA   3/9/2021

## 2021-03-23 ENCOUNTER — OFFICE VISIT (OUTPATIENT)
Dept: CARDIOLOGY CLINIC | Age: 66
End: 2021-03-23
Payer: MEDICARE

## 2021-03-23 VITALS
WEIGHT: 251.2 LBS | HEART RATE: 64 BPM | DIASTOLIC BLOOD PRESSURE: 82 MMHG | BODY MASS INDEX: 35.96 KG/M2 | SYSTOLIC BLOOD PRESSURE: 130 MMHG | HEIGHT: 70 IN

## 2021-03-23 DIAGNOSIS — I25.10 CORONARY ARTERY DISEASE INVOLVING NATIVE CORONARY ARTERY OF NATIVE HEART WITHOUT ANGINA PECTORIS: Primary | ICD-10-CM

## 2021-03-23 PROCEDURE — 93000 ELECTROCARDIOGRAM COMPLETE: CPT | Performed by: INTERNAL MEDICINE

## 2021-03-23 PROCEDURE — G8417 CALC BMI ABV UP PARAM F/U: HCPCS | Performed by: INTERNAL MEDICINE

## 2021-03-23 PROCEDURE — 1036F TOBACCO NON-USER: CPT | Performed by: INTERNAL MEDICINE

## 2021-03-23 PROCEDURE — 3017F COLORECTAL CA SCREEN DOC REV: CPT | Performed by: INTERNAL MEDICINE

## 2021-03-23 PROCEDURE — 99214 OFFICE O/P EST MOD 30 MIN: CPT | Performed by: INTERNAL MEDICINE

## 2021-03-23 PROCEDURE — 4040F PNEUMOC VAC/ADMIN/RCVD: CPT | Performed by: INTERNAL MEDICINE

## 2021-03-23 PROCEDURE — G8427 DOCREV CUR MEDS BY ELIG CLIN: HCPCS | Performed by: INTERNAL MEDICINE

## 2021-03-23 PROCEDURE — 1123F ACP DISCUSS/DSCN MKR DOCD: CPT | Performed by: INTERNAL MEDICINE

## 2021-03-23 PROCEDURE — G8482 FLU IMMUNIZE ORDER/ADMIN: HCPCS | Performed by: INTERNAL MEDICINE

## 2021-03-23 NOTE — PROGRESS NOTES
[id number] Score for Atrial Fibrillation Stroke Risk   Risk   Factors  Component Value   C CHF No 0   H HTN Yes 1   A2 Age >= 76 No,  (67 y.o.) 0   D DM Yes 1   S2 Prior Stroke/TIA No 0   V Vascular Disease No 0   A Age 74-69 Yes,  (67 y.o.) 1   Sc Sex male 0    [id number]  Score  3   Score last updated 3/23/21 11:31 AM EDT

## 2021-03-23 NOTE — PROGRESS NOTES
Denny Garcia  is a  Established patient  ,76 y.o.   male here for evaluation of the following chief complaint(s): A Flutter    Post DCCV        SUBJECTIVE/OBJECTIVE:  HPI : h/o  A flutter, cad, htn, hyperlipidimea now here  Has no cardiac compalins  Feel better since dccv  Review of Systems    neg    Vitals:    03/23/21 1132   BP: 130/82   Pulse: 64   Weight: 251 lb 3.2 oz (113.9 kg)   Height: 5' 10\" (1.778 m)     /82   Pulse 64   Ht 5' 10\" (1.778 m)   Wt 251 lb 3.2 oz (113.9 kg)   BMI 36.04 kg/m²   Patient-Reported Vitals 6/9/2020   Patient-Reported Weight 247lb   Patient-Reported Height 71\"   Patient-Reported Systolic 850   Patient-Reported Diastolic 80   Patient-Reported Pulse 64     Wt Readings from Last 3 Encounters:   03/23/21 251 lb 3.2 oz (113.9 kg)   01/14/21 254 lb 12.8 oz (115.6 kg)   01/04/21 254 lb (115.2 kg)     Body mass index is 36.04 kg/m². Physical Exam     Neck: JVD      Lungs : clear    Cardio : Si and S2 audilble      Ext: edema      All pertinent data reviewed      Meds : reviewed         Tests ordered        ASSESSMENT/PLAN:         - Atrial fibrillation, pt is  compliant with meds. Patient does not have symptoms from atrial fibrillation    Here post DCCV  - VHD  SP  AVR  DC caggeine ad ETOH      -     CORONARY ARTERY DISEASE:  asymptomatic     All available  tests in chart reviewed. Management discussed . Testing ordered  no                                 -  Hypertension: Patients blood pressure is normal. Patient is advised about low sodium diet. Present medical regimen will not be changed. -  LIPID MANAGEMENT:  Importance of lipid levels discussed with patient   and patient was given dietary advice. NCEP- ATP III guidelines reviewed with patient. -   Changes  in medicines made: No                                              '      An electronic signature was used to authenticate this note.     --Claudine Valladares MD

## 2021-05-18 LAB — DIABETIC RETINOPATHY: NEGATIVE

## 2021-06-14 ENCOUNTER — TELEPHONE (OUTPATIENT)
Dept: CARDIOLOGY CLINIC | Age: 66
End: 2021-06-14

## 2021-06-14 NOTE — TELEPHONE ENCOUNTER
Cardiologist: Dr. Siri Coates  Surgeon: Dr. Lexie Paredes  Surgery: Colonoscopy  Anesthesia: General  Date: 7/18/2021  FAX# [phone]  # [phone]    Last OV 3/23/2021 w/Isabel          - Atrial fibrillation, pt is  compliant with meds. Patient does not have symptoms from atrial fibrillation    Here post DCCV  - VHD  SP  AVR  DC caggeine ad ETOH        -     CORONARY ARTERY DISEASE:  asymptomatic     All available  tests in chart reviewed. Management discussed . Testing ordered  no                                  -  Hypertension: Patients blood pressure is normal. Patient is advised about low sodium diet. Present medical regimen will not be changed.          -  LIPID MANAGEMENT:  Importance of lipid levels discussed with patient   and patient was given dietary advice. NCEP- ATP III guidelines reviewed with patient. -   Changes  in medicines made: No          Stress Test GXT-4/13/2018   The patient exercised according to the LIAT for 7:30 min:s, achieving a    work level of Max. METS: 9.30.    The resting heart rate of 66 bpm sudhir to a maximal heart rate of 136 bpm.    This value represents 86 % of the    maximal, age-predicted heart rate. The resting blood pressure of --/-- mmHg    , sudhir to a maximum blood    pressure of 185/86 mmHg. The exercise test was stopped due to target    acheived. Echo- 2/25/2019   Normal left ventricle structure and systolic function with an ejection   fraction of 55-60%. Mildly dilated left atrium. Bioprosthetic aortic valve noted with increased velocities indicating   stenosis, mean gradient of 41 mmHg which is elevated compared to 1/18/19   which was a mean of 20 mmHg. Mild paravalvular leak noted. Mitral annular calcification noted without evidence of stenosis. Normal pulmonary artery pressure. No evidence of pericardial effusion.       CATH- 12/14/2017   SEVERE AS    PDA MID 80% DISEASE RCA 50% DISTAL STENOSIS       CABG- with Aortic valve replacement- 12/15/2017        Eliquis    Aspirin

## 2021-06-29 DIAGNOSIS — E11.9 TYPE 2 DIABETES MELLITUS WITHOUT COMPLICATION, WITHOUT LONG-TERM CURRENT USE OF INSULIN (HCC): ICD-10-CM

## 2021-06-29 LAB
A/G RATIO: 1.4 (ref 1.1–2.2)
ALBUMIN SERPL-MCNC: 4.1 G/DL (ref 3.4–5)
ALP BLD-CCNC: 54 U/L (ref 40–129)
ALT SERPL-CCNC: 15 U/L (ref 10–40)
ANION GAP SERPL CALCULATED.3IONS-SCNC: 13 MMOL/L (ref 3–16)
AST SERPL-CCNC: 20 U/L (ref 15–37)
BILIRUB SERPL-MCNC: 0.7 MG/DL (ref 0–1)
BUN BLDV-MCNC: 18 MG/DL (ref 7–20)
CALCIUM SERPL-MCNC: 8.9 MG/DL (ref 8.3–10.6)
CHLORIDE BLD-SCNC: 103 MMOL/L (ref 99–110)
CHOLESTEROL, TOTAL: 114 MG/DL (ref 0–199)
CO2: 20 MMOL/L (ref 21–32)
CREAT SERPL-MCNC: 0.8 MG/DL (ref 0.8–1.3)
ESTIMATED AVERAGE GLUCOSE: 139.9 MG/DL
GFR AFRICAN AMERICAN: >60
GFR NON-AFRICAN AMERICAN: >60
GLOBULIN: 3 G/DL
GLUCOSE BLD-MCNC: 159 MG/DL (ref 70–99)
HBA1C MFR BLD: 6.5 %
HDLC SERPL-MCNC: 31 MG/DL (ref 40–60)
LDL CHOLESTEROL CALCULATED: 70 MG/DL
POTASSIUM SERPL-SCNC: 5 MMOL/L (ref 3.5–5.1)
SODIUM BLD-SCNC: 136 MMOL/L (ref 136–145)
TOTAL PROTEIN: 7.1 G/DL (ref 6.4–8.2)
TRIGL SERPL-MCNC: 65 MG/DL (ref 0–150)
VLDLC SERPL CALC-MCNC: 13 MG/DL

## 2021-07-01 ENCOUNTER — TELEPHONE (OUTPATIENT)
Dept: CARDIOLOGY CLINIC | Age: 66
End: 2021-07-01

## 2021-07-01 RX ORDER — METFORMIN HYDROCHLORIDE 500 MG/1
2000 TABLET, EXTENDED RELEASE ORAL
Qty: 360 TABLET | Refills: 1 | Status: SHIPPED | OUTPATIENT
Start: 2021-07-01 | End: 2022-02-28

## 2021-07-01 NOTE — TELEPHONE ENCOUNTER
Attempted to call patient back no answer. Left message on home & cell phone advised per Dr. Srinivasan Peoples, patient can take Alphalytic acid with his current medications.

## 2021-07-06 ENCOUNTER — OFFICE VISIT (OUTPATIENT)
Dept: FAMILY MEDICINE CLINIC | Age: 66
End: 2021-07-06
Payer: MEDICARE

## 2021-07-06 VITALS
SYSTOLIC BLOOD PRESSURE: 124 MMHG | HEART RATE: 60 BPM | HEIGHT: 70 IN | DIASTOLIC BLOOD PRESSURE: 80 MMHG | BODY MASS INDEX: 36.08 KG/M2 | WEIGHT: 252 LBS

## 2021-07-06 DIAGNOSIS — E78.5 HYPERLIPIDEMIA, UNSPECIFIED HYPERLIPIDEMIA TYPE: ICD-10-CM

## 2021-07-06 DIAGNOSIS — I48.91 ATRIAL FIBRILLATION BY ELECTROCARDIOGRAM (HCC): Primary | ICD-10-CM

## 2021-07-06 DIAGNOSIS — Z95.1 S/P CABG X 1: ICD-10-CM

## 2021-07-06 DIAGNOSIS — I10 ESSENTIAL HYPERTENSION: ICD-10-CM

## 2021-07-06 DIAGNOSIS — E11.9 TYPE 2 DIABETES MELLITUS WITHOUT COMPLICATION, WITHOUT LONG-TERM CURRENT USE OF INSULIN (HCC): ICD-10-CM

## 2021-07-06 PROCEDURE — 4040F PNEUMOC VAC/ADMIN/RCVD: CPT | Performed by: FAMILY MEDICINE

## 2021-07-06 PROCEDURE — 1036F TOBACCO NON-USER: CPT | Performed by: FAMILY MEDICINE

## 2021-07-06 PROCEDURE — 3017F COLORECTAL CA SCREEN DOC REV: CPT | Performed by: FAMILY MEDICINE

## 2021-07-06 PROCEDURE — 99214 OFFICE O/P EST MOD 30 MIN: CPT | Performed by: FAMILY MEDICINE

## 2021-07-06 PROCEDURE — G8427 DOCREV CUR MEDS BY ELIG CLIN: HCPCS | Performed by: FAMILY MEDICINE

## 2021-07-06 PROCEDURE — 1123F ACP DISCUSS/DSCN MKR DOCD: CPT | Performed by: FAMILY MEDICINE

## 2021-07-06 PROCEDURE — 2022F DILAT RTA XM EVC RTNOPTHY: CPT | Performed by: FAMILY MEDICINE

## 2021-07-06 PROCEDURE — 3044F HG A1C LEVEL LT 7.0%: CPT | Performed by: FAMILY MEDICINE

## 2021-07-06 PROCEDURE — G8417 CALC BMI ABV UP PARAM F/U: HCPCS | Performed by: FAMILY MEDICINE

## 2021-07-06 RX ORDER — ATORVASTATIN CALCIUM 10 MG/1
TABLET, FILM COATED ORAL
Qty: 90 TABLET | Refills: 1 | Status: SHIPPED | OUTPATIENT
Start: 2021-07-06 | End: 2021-12-28

## 2021-07-06 NOTE — PROGRESS NOTES
2021     Emma Puentes      Chief Complaint   Patient presents with    6 Month Follow-Up    Other     pt states he has something he would like looked at, did not mention what       HPI      Wilner Mejía is a 78 y.o. male who presents today with the followin. Atrial fibrillation by electrocardiogram ([address]    2. Essential hypertension    3. Hyperlipidemia, unspecified hyperlipidemia type    4. S/P CABG x 1    5. Type 2 diabetes mellitus without complication, without long-term current use of insulin ([address]    Patient is here for 6-month follow-up  He is a well-controlled diabetic  He has been to cardiology and had a DC cardioversion since he was last here apparently was successful medications with cardiology were reviewed    REVIEW OF SYMPTOMS    Review of Systems   Cardiovascular:        History of hypertension hyperlipidemia aortic valve replacement and atrial fibrillation/flutter recently cardioverted  Has had coronary bypass surgery as well   Gastrointestinal:        History of colon polyps  He is up-to-date on colonoscopy   Endocrine:        Well-controlled diabetes type 2   Genitourinary:        Mild BPH symptoms   Skin:        1 month history of pruritic rash in the upper portion of the gluteal fold he is tried topical Neosporin with no improvement       PAST MEDICAL HISTORY  Past Medical History:   Diagnosis Date    A-fib ([address]     Arthritis     Knees, Feet    Atrial fibrillation ([address]     Back pain     Lower Back    CAD (coronary artery disease)     Diabetes mellitus ([address]     H/O echocardiogram 2017    EF 50-55%.  severe aortic stenosis is present, mod AR, mild MR and TR    History of benign prostatic hyperplasia     History of cardiac cath 2017    Severe AS, PDA MID 80% disease, RCA 50% distal stenosis, AVR + CABG VS TAVR and PCI, CTS CONS    History of echocardiogram 2019    EF 55-60%, Mildly dilated left atrium, Bioprosthetic aortic valve noted with increased velocities indicating stenosis, Mild paravalvular leak, Mitral annular calcification noted without evidence of stenosis    History of exercise stress test 2018    treadmill    History of Holter monitoring 02/15/2018    48 hour holter monitor  One short run of afib.  Hx of colonic polyp     Hx of Doppler echocardiogram 2018    EF50-55%,possible stenosis bioprosthetic valve possible perivalval leak,    Hyperlipidemia     Hypertension     Murmur, cardiac     Obesity     S/P AVR 12/15/2017    with cabg x1 svg-pda       FAMILY HISTORY  Family History   Problem Relation Age of Onset    Stroke Mother     Arthritis Mother     Early Death Father         Suicide in his 42's    Other Sister         Parkinson    High Blood Pressure Sister        SOCIAL HISTORY  Social History     Socioeconomic History    Marital status:      Spouse name: None    Number of children: None    Years of education: None    Highest education level: None   Occupational History    None   Tobacco Use    Smoking status: Former Smoker     Packs/day: 1.00     Years: 24.00     Pack years: 24.00     Start date:      Quit date:      Years since quittin.5    Smokeless tobacco: Never Used   Vaping Use    Vaping Use: Never used   Substance and Sexual Activity    Alcohol use: Yes     Alcohol/week: 12.0 standard drinks     Types: 12 Cans of beer per week    Drug use: No    Sexual activity: Never   Other Topics Concern    None   Social History Narrative    None     Social Determinants of Health     Financial Resource Strain:     Difficulty of Paying Living Expenses:    Food Insecurity:     Worried About Running Out of Food in the Last Year:     Ran Out of Food in the Last Year:    Transportation Needs:     Lack of Transportation (Medical):      Lack of Transportation (Non-Medical):    Physical Activity:     Days of Exercise per Week:     Minutes of Exercise per Session:    Stress:     Feeling of Stress : Social Connections:     Frequency of Communication with Friends and Family:     Frequency of Social Gatherings with Friends and Family:     Attends [employer] Services:     Active Member of Clubs or Organizations:     Attends Club or Organization Meetings:     Marital Status:    Intimate Partner Violence:     Fear of Current or Ex-Partner:     Emotionally Abused:     Physically Abused:     Sexually Abused:         SURGICAL HISTORY  Past Surgical History:   Procedure Laterality Date    AORTIC VALVE REPLACEMENT  12/15/2017    with cabg x1 svg-pda    APPENDECTOMY  2013    COLONOSCOPY  9/16/16    Diverticulosis, polyp x1    HERNIA REPAIR  2014    with Mesh    HERNIA REPAIR  2018    JOINT REPLACEMENT Left 2014    TKA    KNEE CARTILAGE SURGERY Left     URACHAL CYST INCISION      Partial Bladder removal and Appendectomy    VENTRAL HERNIA REPAIR  4/7/16    laprascopic       CURRENT MEDICATIONS  Current Outpatient Medications   Medication Sig Dispense Refill    apixaban (ELIQUIS) 5 MG TABS tablet Take 1 tablet by mouth 2 times daily 180 tablet 1    atorvastatin (LIPITOR) 10 MG tablet TAKE 1 TABLET BY MOUTH EVERY DAY 90 tablet 1    hydrocortisone 2.5 % cream Apply topically 2 times daily. 1 Tube 1    metFORMIN (GLUCOPHAGE-XR) 500 MG extended release tablet TAKE 4 TABLETS BY MOUTH DAILY (WITH BREAKFAST) 360 tablet 1    carvedilol (COREG) 25 MG tablet Take 1 tablet by mouth 2 times daily 180 tablet 3    aspirin 81 MG EC tablet Take 1 tablet by mouth daily 90 tablet 1    vitamin C (ASCORBIC ACID) 500 MG tablet Take 500 mg by mouth daily      Melatonin 5 MG CAPS Take 1 tablet by mouth nightly as needed       Multiple Vitamins-Minerals (PX SENIOR VITAMIN PO) Take 1 tablet by mouth daily      ZINC GLUCONATE PO Take 1 tablet by mouth Daily (Patient not taking: Reported on 7/6/2021)       No current facility-administered medications for this visit.        ALLERGIES  No Known Allergies    PHYSICAL EXAM    /80   Pulse 60   Ht 5' 10\" (1.778 m)   Wt 252 lb (114.3 kg)   BMI 36.16 kg/m²     Physical Exam  Vitals and nursing note reviewed. Constitutional:       Appearance: Normal appearance. Cardiovascular:      Rate and Rhythm: Normal rate. Pulmonary:      Effort: Pulmonary effort is normal.   Skin:     Comments: Erythematous dermatitis in the gluteal fold there is no exudate tenderness or ulceration     Reviewed recent labs  Reviewed immunizations    ASSESSMENT and PLAN    Hiral Mora was seen today for 6 month follow-up and other. Diagnoses and all orders for this visit:    Atrial fibrillation by electrocardiogram [facility])    Essential hypertension    Hyperlipidemia, unspecified hyperlipidemia type    S/P CABG x 1    Type 2 diabetes mellitus without complication, without long-term current use of insulin ([facility]  -     Lipid Panel; Future  -     Hemoglobin A1C; Future  -     Comprehensive Metabolic Panel; Future    Other orders  -     apixaban (ELIQUIS) 5 MG TABS tablet; Take 1 tablet by mouth 2 times daily  -     atorvastatin (LIPITOR) 10 MG tablet; TAKE 1 TABLET BY MOUTH EVERY DAY  -     hydrocortisone 2.5 % cream; Apply topically 2 times daily. Trial of a steroid cream for the rash if that is not effective it could be Candida infection but did not look like that    No follow-ups on file. Electronically signed by Hilary Figueroa MD on 7/6/2021    Please note that this chart was generated using dragon dictation software. Although every effort was made to ensure the accuracy of this automated transcription, some errors in transcription may have occurred.

## 2021-10-06 ENCOUNTER — OFFICE VISIT (OUTPATIENT)
Dept: CARDIOLOGY CLINIC | Age: 66
End: 2021-10-06
Payer: MEDICARE

## 2021-10-06 VITALS
SYSTOLIC BLOOD PRESSURE: 112 MMHG | HEART RATE: 60 BPM | DIASTOLIC BLOOD PRESSURE: 72 MMHG | HEIGHT: 70 IN | WEIGHT: 252 LBS | BODY MASS INDEX: 36.08 KG/M2

## 2021-10-06 DIAGNOSIS — E78.5 HYPERLIPIDEMIA, UNSPECIFIED HYPERLIPIDEMIA TYPE: ICD-10-CM

## 2021-10-06 DIAGNOSIS — I25.10 CORONARY ARTERY DISEASE INVOLVING NATIVE CORONARY ARTERY OF NATIVE HEART WITHOUT ANGINA PECTORIS: ICD-10-CM

## 2021-10-06 DIAGNOSIS — I10 PRIMARY HYPERTENSION: ICD-10-CM

## 2021-10-06 DIAGNOSIS — I48.3 TYPICAL ATRIAL FLUTTER (HCC): Primary | ICD-10-CM

## 2021-10-06 DIAGNOSIS — I38 VHD (VALVULAR HEART DISEASE): ICD-10-CM

## 2021-10-06 PROCEDURE — G8484 FLU IMMUNIZE NO ADMIN: HCPCS | Performed by: NURSE PRACTITIONER

## 2021-10-06 PROCEDURE — 3017F COLORECTAL CA SCREEN DOC REV: CPT | Performed by: NURSE PRACTITIONER

## 2021-10-06 PROCEDURE — 1123F ACP DISCUSS/DSCN MKR DOCD: CPT | Performed by: NURSE PRACTITIONER

## 2021-10-06 PROCEDURE — 1036F TOBACCO NON-USER: CPT | Performed by: NURSE PRACTITIONER

## 2021-10-06 PROCEDURE — G8427 DOCREV CUR MEDS BY ELIG CLIN: HCPCS | Performed by: NURSE PRACTITIONER

## 2021-10-06 PROCEDURE — G8417 CALC BMI ABV UP PARAM F/U: HCPCS | Performed by: NURSE PRACTITIONER

## 2021-10-06 PROCEDURE — 99214 OFFICE O/P EST MOD 30 MIN: CPT | Performed by: NURSE PRACTITIONER

## 2021-10-06 PROCEDURE — 4040F PNEUMOC VAC/ADMIN/RCVD: CPT | Performed by: NURSE PRACTITIONER

## 2021-10-06 ASSESSMENT — ENCOUNTER SYMPTOMS
ORTHOPNEA: 0
BACK PAIN: 1
SHORTNESS OF BREATH: 0

## 2021-10-06 NOTE — PROGRESS NOTES
10/6/2021  Primary cardiologist: Dr. Johnson Boggs  is an established 78 y.o.  male here for follow-up on   1. Typical atrial flutter ([address]    2. VHD (valvular heart disease)    3. Coronary artery disease involving native coronary artery of native heart without angina pectoris    4. Primary hypertension    5. Hyperlipidemia, unspecified hyperlipidemia type            SUBJECTIVE/OBJECTIVE:    HPI : Ramses Cannon is a 69-year-old gentleman with a history of coronary artery disease s/p CABG x1, valvular disease s/p AVR, atrial fibrillation/atrial flutter, hypertension and hyperlipidemia. In December 2017 he underwent CABG x1 with SVG to PDA and aortic valve replacement with a number 23 mm Medtronic Mosaic valve. He has had previous history of atrial fibrillation/flutter status post cardioversion. He underwent cardioversion of atrial flutter in March 2021 as well. Ramses Cannon reports he is feeling well. He denies cardiac complaints. Notes his blood sugar has been well controlled. He exercise daily with walking about 1 hour a day. He drinks occasional cup of coffee and a couple beers a week. Review of Systems   Constitutional: Negative for diaphoresis and malaise/fatigue. Cardiovascular: Negative for chest pain, claudication, dyspnea on exertion, irregular heartbeat, leg swelling, near-syncope, orthopnea, palpitations and paroxysmal nocturnal dyspnea. Respiratory: Negative for shortness of breath. Musculoskeletal: Positive for arthritis and back pain. Neurological: Negative for dizziness and light-headedness.        Vitals:    10/06/21 0851   BP: 112/72   Pulse: 60   Weight: 252 lb (114.3 kg)   Height: 5' 10\" (1.778 m)     Patient-Reported Vitals 6/9/2020   Patient-Reported Weight 247lb   Patient-Reported Height 71\"   Patient-Reported Systolic 992   Patient-Reported Diastolic 80   Patient-Reported Pulse 64     Wt Readings from Last 3 Encounters:   10/06/21 252 lb (114.3 kg)   07/06/21 252 lb (114.3 kg) Continue to exercise -can add healthy nuts to his diet such as walnuts and almonds to assist with raising of HDL.:  DASH diet or Mediterranean diet as well can be utilized to assist with lipid management  continue Lipitor       Medications reviewed and confirmed with patient     Tests ordered:  echo      Follow-up  6 months      Signed:  TASHA Raymundo CNP, 10/6/2021, 9:03 AM    An electronic signature was used to authenticate this note.

## 2021-10-06 NOTE — LETTER
Louise Gardner  [birth year]  [id number]    Have you had any Chest Pain that is not new? - No       DO EKG IF: Patient has a Heart Rate above 100 or below 40     CAD (Coronary Artery Disease) patient should have one on file every 6 months        Have you had any Shortness of Breath - No      Have you had any dizziness - No       Sitting wait 5 minutes do supine (laying down) wait 5 minutes then do standing - log each in \"vitals\" area in Epic   Be sure to ask what symptoms they are having if they get dizzy while completing ortho stats such as: room spinning, nausea, etc.    Have you had any palpitations that are not new? - No      Is the patient on any of the following medications - No  If Yes DO EKG - Needs done every 6 months    Do you have any edema - swelling in No        Vein \"LEG PROBLEM Questionnaire\"  1. Do you have prominent leg veins? No   2. Do you have any skin discoloration? No  3. Do you have any healed or active sores? Yes  4. Do you have swelling of the legs? No  5. Do you have a family history of varicose veins? No  6. Does your profession involve pro-longed        standing or heavy lifting? No  7. Have you been fighting overweight problems? Yes  8. Do you have restless legs? No  9. Do you have any night time cramps? No  10. Do you have any of the following in your legs:        Aching and Tiredness     When did you have your last labs drawn 07/2021   Where did you have them done   What doctor ordered Celestina Lu    If we do not have these labs you are retrieve these labs for these providers!     Do you have a surgery or procedure scheduled in the near future - No       Ask patient if they want to sign up for [facility] if they are not already signed up     Check to see if we have an E-MAIL on file for the patient     Check medication list thoroughly!!! AND RECONCILE OUTSIDE MEDICATIONS  If dose has changed change the entire order not just the MG  BE SURE TO ASK PATIENT IF THEY NEED MEDICATION REFILLS     At check out add to every patient's \"wrap up\" the following dot phrase AFTERHOURSEDUCATION and ensure we explain this to our patients

## 2021-10-07 ENCOUNTER — PROCEDURE VISIT (OUTPATIENT)
Dept: CARDIOLOGY CLINIC | Age: 66
End: 2021-10-07
Payer: MEDICARE

## 2021-10-07 DIAGNOSIS — I38 VHD (VALVULAR HEART DISEASE): ICD-10-CM

## 2021-10-07 LAB
LV EF: 58 %
LVEF MODALITY: NORMAL

## 2021-10-07 PROCEDURE — 93306 TTE W/DOPPLER COMPLETE: CPT | Performed by: INTERNAL MEDICINE

## 2021-10-08 ENCOUNTER — TELEPHONE (OUTPATIENT)
Dept: CARDIOLOGY CLINIC | Age: 66
End: 2021-10-08

## 2021-10-08 NOTE — TELEPHONE ENCOUNTER
Appointment set up with Dom Friedman CNP to disucss echo results next Wednesday 10/13/2021. Left ventricular function and size is normal, EF is estimated at 55-60%. Mild left ventricular hypertrophy. Grade I diastolic dysfunction. No regional wall motion abnormalities were detected. Bi atrial enlargement noted. Bioprosthetic aortic valve is noted with increase velocities indicating   stenosis, mean gradient 42mmHg,which compared to 2/2019 study increased from   41mmHg to 42mmHg. AT 77msec. Mild paravalvular leak noted. Mitral annular calcification is present. Posterior leaflet appears to be   calcified. Mild mitral stenosis with Mean gradient of 2mmHg. Mild mitral and tricuspid regurgitation is present. RVSP is 22 mmHg. No evidence of pericardial effusion.    OV TO DISCUSS ECHO FINDINGS

## 2021-10-13 ENCOUNTER — OFFICE VISIT (OUTPATIENT)
Dept: CARDIOLOGY CLINIC | Age: 66
End: 2021-10-13
Payer: MEDICARE

## 2021-10-13 VITALS
WEIGHT: 253.6 LBS | BODY MASS INDEX: 36.31 KG/M2 | HEART RATE: 72 BPM | DIASTOLIC BLOOD PRESSURE: 78 MMHG | SYSTOLIC BLOOD PRESSURE: 118 MMHG | HEIGHT: 70 IN

## 2021-10-13 DIAGNOSIS — I38 VHD (VALVULAR HEART DISEASE): Primary | ICD-10-CM

## 2021-10-13 DIAGNOSIS — Z01.810 PRE-OPERATIVE CARDIOVASCULAR EXAMINATION: Primary | ICD-10-CM

## 2021-10-13 PROCEDURE — 99213 OFFICE O/P EST LOW 20 MIN: CPT | Performed by: NURSE PRACTITIONER

## 2021-10-13 PROCEDURE — 1123F ACP DISCUSS/DSCN MKR DOCD: CPT | Performed by: NURSE PRACTITIONER

## 2021-10-13 PROCEDURE — 1036F TOBACCO NON-USER: CPT | Performed by: NURSE PRACTITIONER

## 2021-10-13 PROCEDURE — 3017F COLORECTAL CA SCREEN DOC REV: CPT | Performed by: NURSE PRACTITIONER

## 2021-10-13 PROCEDURE — 4040F PNEUMOC VAC/ADMIN/RCVD: CPT | Performed by: NURSE PRACTITIONER

## 2021-10-13 PROCEDURE — G8484 FLU IMMUNIZE NO ADMIN: HCPCS | Performed by: NURSE PRACTITIONER

## 2021-10-13 PROCEDURE — G8417 CALC BMI ABV UP PARAM F/U: HCPCS | Performed by: NURSE PRACTITIONER

## 2021-10-13 PROCEDURE — G8427 DOCREV CUR MEDS BY ELIG CLIN: HCPCS | Performed by: NURSE PRACTITIONER

## 2021-10-13 NOTE — LETTER
Vashti Hu  [birth year]  [id number]    Have you had any Chest Pain that is not new? - No       DO EKG IF: Patient has a Heart Rate above 100 or below 40     CAD (Coronary Artery Disease) patient should have one on file every 6 months        Have you had any Shortness of Breath - No      Have you had any dizziness - No       Sitting wait 5 minutes do supine (laying down) wait 5 minutes then do standing - log each in \"vitals\" area in Epic   Be sure to ask what symptoms they are having if they get dizzy while completing ortho stats such as: room spinning, nausea, etc.    Have you had any palpitations that are not new? - No      Is the patient on any of the following medications - No  If Yes DO EKG - Needs done every 6 months    Do you have any edema - swelling in No        Vein \"LEG PROBLEM Questionnaire\"  1. Do you have prominent leg veins? No   2. Do you have any skin discoloration? No  3. Do you have any healed or active sores? Yes  4. Do you have swelling of the legs? No  5. Do you have a family history of varicose veins? No  6. Does your profession involve pro-longed        standing or heavy lifting? No  7. Have you been fighting overweight problems? Yes  8. Do you have restless legs? No  9. Do you have any night time cramps? No  10. Do you have any of the following in your legs: No             When did you have your last labs drawn 07/06/2021  Where did you have them done   What doctor ordered Cinthya Dorsey    If we do not have these labs you are retrieve these labs for these providers!     Do you have a surgery or procedure scheduled in the near future - No     Ask patient if they want to sign up for [employer] if they are not already signed up     Check to see if we have an E-MAIL on file for the patient     Check medication list thoroughly!!! AND RECONCILE OUTSIDE MEDICATIONS  If dose has changed change the entire order not just the MG  BE SURE TO ASK PATIENT IF THEY NEED MEDICATION REFILLS     At check out add to every patient's \"wrap up\" the following dot phrase AFTERHOURSEDUCATION and ensure we explain this to our patients

## 2021-10-13 NOTE — PROGRESS NOTES
10/13/2021  Primary cardiologist: Dr. Alec Yung  is an established 78 y.o.  male here for follow-up on abnormal echocardiogram      SUBJECTIVE/OBJECTIVE:    HPI : Elise Green is a 26-year-old gentleman with a history of coronary artery disease s/p CABG x1, valvular disease s/p AVR, atrial fibrillation/atrial flutter, hypertension and hyperlipidemia. In December 2017 he underwent CABG x1 with SVG to PDA and aortic valve replacement with a number 23 mm Medtronic Mosaic valve. He has had previous history of atrial fibrillation/flutter status post cardioversion. He underwent cardioversion of atrial flutter in March 2021 as well. He recently underwent an transthoracic echocardiogram that showed increase in the velocities across the aortic valve which have increased since last OV. The bioprosthetic valve is noted to have a mean gradient of 42 mmHg, AT 77 ms and aortic valve area 1.02 cm²    He denies any symptoms such as shortness of breath lightheadedness, dizziness palpitations or near syncope.       Review of Systems   Cardiovascular: Negative for chest pain, dyspnea on exertion, near-syncope, palpitations and syncope. Vitals:    10/13/21 0932   BP: 118/78   Pulse: 72   Weight: 253 lb 9.6 oz (115 kg)   Height: 5' 10\" (1.778 m)     Patient-Reported Vitals 6/9/2020   Patient-Reported Weight 247lb   Patient-Reported Height 71\"   Patient-Reported Systolic 484   Patient-Reported Diastolic 80   Patient-Reported Pulse 64     Wt Readings from Last 3 Encounters:   10/13/21 253 lb 9.6 oz (115 kg)   10/06/21 252 lb (114.3 kg)   07/06/21 252 lb (114.3 kg)     Body mass index is 36.39 kg/m². Physical Exam  HENT:      Head: Normocephalic and atraumatic. Cardiovascular:      Rate and Rhythm: Normal rate and regular rhythm. Heart sounds: Murmur heard. Pulmonary:      Effort: Pulmonary effort is normal.      Breath sounds: Normal breath sounds. Skin:     General: Skin is warm.       Capillary Refill: Capillary refill takes less than 2 seconds. Neurological:      General: No focal deficit present. Mental Status: He is oriented to person, place, and time. Current Outpatient Medications   Medication Sig Dispense Refill    apixaban (ELIQUIS) 5 MG TABS tablet Take 1 tablet by mouth 2 times daily 180 tablet 1    atorvastatin (LIPITOR) 10 MG tablet TAKE 1 TABLET BY MOUTH EVERY DAY 90 tablet 1    hydrocortisone 2.5 % cream Apply topically 2 times daily. 1 Tube 1    metFORMIN (GLUCOPHAGE-XR) 500 MG extended release tablet TAKE 4 TABLETS BY MOUTH DAILY (WITH BREAKFAST) 360 tablet 1    ZINC GLUCONATE PO Take 1 tablet by mouth Daily       vitamin C (ASCORBIC ACID) 500 MG tablet Take 500 mg by mouth daily      Melatonin 5 MG CAPS Take 1 tablet by mouth nightly as needed       Multiple Vitamins-Minerals (PX SENIOR VITAMIN PO) Take 1 tablet by mouth daily      carvedilol (COREG) 25 MG tablet Take 1 tablet by mouth 2 times daily 180 tablet 3    aspirin 81 MG EC tablet Take 1 tablet by mouth daily 90 tablet 1     No current facility-administered medications for this visit. All pertinent data reviewed and discussed with patient    Echocardiogram 10-    Left ventricular function and size is normal, EF is estimated at 55-60%. Mild left ventricular hypertrophy. Grade I diastolic dysfunction. No regional wall motion abnormalities were detected. Bi atrial enlargement noted. Bioprosthetic aortic valve is noted with increase velocities indicating   stenosis, mean gradient 42mmHg,which compared to 2/2019 study increased from   41mmHg to 42mmHg. AT 77msec. Mild paravalvular leak noted. Mitral annular calcification is present. Posterior leaflet appears to be   calcified. Mild mitral stenosis with Mean gradient of 2mmHg. Mild mitral and tricuspid regurgitation is present. RVSP is 22 mmHg. No evidence of pericardial effusion.      ASSESSMENT/PLAN:    VHD  H/o AVR   Now with increased velocities indicating stenosis   recommend to check ADIEL to further assess CAROLYNN /velosisities  He would like to go home and discuss with his wife and arrange for ADIEL        Medications reviewed and confirmed with patient     Tests ordered:  ADIEL      Follow-up  With Dr Ilya Mchugh       Signed:  TASHA Hand CNP, 10/13/2021, 9:35 AM    An electronic signature was used to authenticate this note.

## 2021-10-13 NOTE — PATIENT INSTRUCTIONS
**It is YOUR responsibilty to bring medication bottles and/or updated medication list to [address] [place] [address]. This will allow us to better serve you and all your healthcare needs**  Please be informed that if you contact our office outside of normal business hours the physician on call cannot help with any scheduling or rescheduling issues, procedure instruction questions or any type of medication issue. We advise you for any urgent/emergency that you go to the nearest emergency room!     PLEASE CALL OUR OFFICE DURING NORMAL BUSINESS HOURS    Monday - Friday   8 am to 5 pm    [place]: [address]: [phone]    [place]:  [phone]

## 2021-10-14 ENCOUNTER — HOSPITAL ENCOUNTER (OUTPATIENT)
Age: 66
Setting detail: SPECIMEN
Discharge: HOME | End: 2021-10-14
Payer: MEDICARE

## 2021-10-14 ENCOUNTER — NURSE ONLY (OUTPATIENT)
Dept: CARDIOLOGY CLINIC | Age: 66
End: 2021-10-14
Payer: MEDICARE

## 2021-10-14 VITALS — HEART RATE: 97 BPM | SYSTOLIC BLOOD PRESSURE: 118 MMHG | DIASTOLIC BLOOD PRESSURE: 72 MMHG

## 2021-10-14 DIAGNOSIS — Z01.810 PRE-OPERATIVE CARDIOVASCULAR EXAMINATION: ICD-10-CM

## 2021-10-14 PROCEDURE — 99211 OFF/OP EST MAY X REQ PHY/QHP: CPT | Performed by: INTERNAL MEDICINE

## 2021-10-14 PROCEDURE — U0003 INFECTIOUS AGENT DETECTION BY NUCLEIC ACID (DNA OR RNA); SEVERE ACUTE RESPIRATORY SYNDROME CORONAVIRUS 2 (SARS-COV-2) (CORONAVIRUS DISEASE [COVID-19]), AMPLIFIED PROBE TECHNIQUE, MAKING USE OF HIGH THROUGHPUT TECHNOLOGIES AS DESCRIBED BY CMS-2020-01-R: HCPCS

## 2021-10-14 PROCEDURE — U0005 INFEC AGEN DETEC AMPLI PROBE: HCPCS

## 2021-10-14 NOTE — PROGRESS NOTES
Patient performed COVID oral swab. This RN present in the room, 6 feet away. Patient dropped swab in  labeled collection tube. Lab order, facesheet and copy of insurance card placed in collection bag. Bag placed in biohazard bag and placed in fridge. Patient here in office & educated on ADIEL for Dx: Isabel, scheduled for 10/19/21 @ 10:00, w/arrival @ 8:30, @ [facility]. Risks explained; & consents signed. Instructions given to pt to: remian NPO after midnight the night before procedure. Patient to call hospital @ [phone] to pre-register. May take morning meds the morning of procedure. Patient was notified that procedure could be delayed due to an emergency. Patient voiced understanding. Copies of consent, & info. sheet scanned into media.

## 2021-10-15 LAB — SARS-COV-2: NOT DETECTED

## 2021-10-18 RX ORDER — ASPIRIN 81 MG/1
TABLET ORAL
Qty: 90 TABLET | Refills: 1 | Status: SHIPPED | OUTPATIENT
Start: 2021-10-18 | End: 2022-06-29

## 2021-10-19 ENCOUNTER — HOSPITAL ENCOUNTER (OUTPATIENT)
Dept: NON INVASIVE DIAGNOSTICS | Age: 66
Discharge: HOME | End: 2021-10-19
Payer: MEDICARE

## 2021-10-19 VITALS
HEART RATE: 70 BPM | DIASTOLIC BLOOD PRESSURE: 63 MMHG | OXYGEN SATURATION: 96 % | RESPIRATION RATE: 18 BRPM | SYSTOLIC BLOOD PRESSURE: 154 MMHG

## 2021-10-19 LAB
LV EF: 53 %
LVEF MODALITY: NORMAL

## 2021-10-19 PROCEDURE — 2500000003 HC RX 250 WO HCPCS: Performed by: INTERNAL MEDICINE

## 2021-10-19 PROCEDURE — 93312 ECHO TRANSESOPHAGEAL: CPT

## 2021-10-19 PROCEDURE — 7100000000 HC PACU RECOVERY - FIRST 15 MIN

## 2021-10-19 PROCEDURE — 7100000001 HC PACU RECOVERY - ADDTL 15 MIN

## 2021-10-19 PROCEDURE — 6370000000 HC RX 637 (ALT 250 FOR IP): Performed by: INTERNAL MEDICINE

## 2021-10-19 RX ORDER — LIDOCAINE HYDROCHLORIDE 20 MG/ML
15 SOLUTION OROPHARYNGEAL ONCE
Status: COMPLETED | OUTPATIENT
Start: 2021-10-19 | End: 2021-10-19

## 2021-10-19 RX ORDER — FLUMAZENIL 0.1 MG/ML
0.2 INJECTION, SOLUTION INTRAVENOUS ONCE
Status: COMPLETED | OUTPATIENT
Start: 2021-10-19 | End: 2021-10-19

## 2021-10-19 RX ADMIN — FLUMAZENIL 0.2 MG: 0.1 INJECTION INTRAVENOUS at 11:03

## 2021-10-19 RX ADMIN — LIDOCAINE HYDROCHLORIDE 15 ML: 20 SOLUTION ORAL; TOPICAL at 11:03

## 2021-10-19 NOTE — PROGRESS NOTES
Discussed DC instructions, patient signed. No further questions. IV removed from R arm no complications. Taken per [facility] to wife in personal vehicle for discharge home.

## 2021-11-15 ENCOUNTER — OFFICE VISIT (OUTPATIENT)
Dept: CARDIOLOGY CLINIC | Age: 66
End: 2021-11-15
Payer: MEDICARE

## 2021-11-15 VITALS
HEIGHT: 70 IN | HEART RATE: 62 BPM | SYSTOLIC BLOOD PRESSURE: 132 MMHG | WEIGHT: 258.8 LBS | BODY MASS INDEX: 37.05 KG/M2 | DIASTOLIC BLOOD PRESSURE: 78 MMHG

## 2021-11-15 DIAGNOSIS — I38 VHD (VALVULAR HEART DISEASE): Primary | ICD-10-CM

## 2021-11-15 PROCEDURE — 3017F COLORECTAL CA SCREEN DOC REV: CPT | Performed by: NURSE PRACTITIONER

## 2021-11-15 PROCEDURE — G8484 FLU IMMUNIZE NO ADMIN: HCPCS | Performed by: NURSE PRACTITIONER

## 2021-11-15 PROCEDURE — G8417 CALC BMI ABV UP PARAM F/U: HCPCS | Performed by: NURSE PRACTITIONER

## 2021-11-15 PROCEDURE — 1123F ACP DISCUSS/DSCN MKR DOCD: CPT | Performed by: NURSE PRACTITIONER

## 2021-11-15 PROCEDURE — 99213 OFFICE O/P EST LOW 20 MIN: CPT | Performed by: NURSE PRACTITIONER

## 2021-11-15 PROCEDURE — G8427 DOCREV CUR MEDS BY ELIG CLIN: HCPCS | Performed by: NURSE PRACTITIONER

## 2021-11-15 PROCEDURE — 1036F TOBACCO NON-USER: CPT | Performed by: NURSE PRACTITIONER

## 2021-11-15 PROCEDURE — 4040F PNEUMOC VAC/ADMIN/RCVD: CPT | Performed by: NURSE PRACTITIONER

## 2021-11-15 ASSESSMENT — ENCOUNTER SYMPTOMS
SHORTNESS OF BREATH: 0
ORTHOPNEA: 0

## 2021-11-15 NOTE — PROGRESS NOTES
11/15/2021  Primary cardiologist: Dr. Nery Drake  is an established 78 y.o.  male here for follow-up on ADIEL/ VHD      SUBJECTIVE/OBJECTIVE:    HPI : Debbie Landin is a 19-year-old gentleman with a history of coronary artery disease s/p CABG x1, valvular disease s/p AVR, atrial fibrillation/atrial flutter, hypertension and hyperlipidemia.  In December 2017 he underwent CABG x1 with SVG to PDA and aortic valve replacement with a number 23 mm Medtronic Mosaic valve.  He has had previous history of atrial fibrillation/flutter status post cardioversion.  He underwent cardioversion of atrial flutter in March 2021 as well. His transthoracic echocardiogram in October 2021 was concerning for elevated velocities hence he underwent a ADIEL. The ADIEL sowed no evidence of stenosis. Debbie Landin reports he is feeling well. He denies shortness of breath or chest pain. Review of Systems   Constitutional: Negative for diaphoresis and malaise/fatigue. Cardiovascular: Negative for chest pain, claudication, dyspnea on exertion, irregular heartbeat, leg swelling, near-syncope, orthopnea, palpitations and paroxysmal nocturnal dyspnea. Respiratory: Negative for shortness of breath. Neurological: Negative for dizziness and light-headedness. Vitals:    11/15/21 0901   BP: 132/78   Pulse: 62   Weight: 258 lb 12.8 oz (117.4 kg)   Height: 5' 10\" (1.778 m)     Patient-Reported Vitals 6/9/2020   Patient-Reported Weight 247lb   Patient-Reported Height 71\"   Patient-Reported Systolic 428   Patient-Reported Diastolic 80   Patient-Reported Pulse 64     Wt Readings from Last 3 Encounters:   11/15/21 258 lb 12.8 oz (117.4 kg)   10/13/21 253 lb 9.6 oz (115 kg)   10/06/21 252 lb (114.3 kg)     Body mass index is 37.13 kg/m². Physical Exam  Constitutional:       Appearance: He is obese. Cardiovascular:      Rate and Rhythm: Normal rate and regular rhythm. Heart sounds: Murmur heard.        Pulmonary:      Effort: Pulmonary effort is normal.      Breath sounds: Normal breath sounds. Musculoskeletal:         General: Normal range of motion. Skin:     General: Skin is warm and dry. Capillary Refill: Capillary refill takes less than 2 seconds. Neurological:      Mental Status: He is alert and oriented to person, place, and time. Current Outpatient Medications   Medication Sig Dispense Refill    aspirin 81 MG EC tablet TAKE 1 TABLET BY MOUTH EVERY DAY 90 tablet 1    apixaban (ELIQUIS) 5 MG TABS tablet Take 1 tablet by mouth 2 times daily 180 tablet 1    atorvastatin (LIPITOR) 10 MG tablet TAKE 1 TABLET BY MOUTH EVERY DAY 90 tablet 1    hydrocortisone 2.5 % cream Apply topically 2 times daily. 1 Tube 1    metFORMIN (GLUCOPHAGE-XR) 500 MG extended release tablet TAKE 4 TABLETS BY MOUTH DAILY (WITH BREAKFAST) 360 tablet 1    carvedilol (COREG) 25 MG tablet Take 1 tablet by mouth 2 times daily 180 tablet 3    ZINC GLUCONATE PO Take 1 tablet by mouth Daily       vitamin C (ASCORBIC ACID) 500 MG tablet Take 500 mg by mouth daily      Melatonin 5 MG CAPS Take 1 tablet by mouth nightly as needed       Multiple Vitamins-Minerals (PX SENIOR VITAMIN PO) Take 1 tablet by mouth daily       No current facility-administered medications for this visit. All pertinent data reviewed and discussed with patient    Transthoracic echocardiogram 10/07/2021  Left ventricular function and size is normal, EF is estimated at 55-60%. Mild left ventricular hypertrophy. Grade I diastolic dysfunction. No regional wall motion abnormalities were detected. Bi atrial enlargement noted. Bioprosthetic aortic valve is noted with increase velocities indicating   stenosis, mean gradient 42mmHg,which compared to 2/2019 study increased from   41mmHg to 42mmHg. AT 77msec. Mild paravalvular leak noted. Mitral annular calcification is present. Posterior leaflet appears to be   calcified.    Mild mitral stenosis with Mean

## 2021-11-15 NOTE — LETTER
Jarrell Raymond  [id number]  [id number]    Have you had any Chest Pain that is not new? - No  If Yes DO EKG - How does it feel -    How long does the pain last -      How long have you been having the pain -    Did you take a    And did it relieve the pain -      DO EKG IF: Patient has a Heart Rate above 100 or below 40     CAD (Coronary Artery Disease) patient should have one on file every 6 months        Have you had any Shortness of Breath - No  If Yes - When     Have you had any dizziness - No  If Yes DO ORTHOSTATIC BP - when do you feel dizzy    How long does it last .       Sitting wait 5 minutes do supine (laying down) wait 5 minutes then do standing - log each in \"vitals\" area in Epic   Be sure to ask what symptoms they are having if they get dizzy while completing ortho stats such as: room spinning, nausea, etc.    Have you had any palpitations that are not new? - No  If Yes DO EKG - Do you feel your heart   How long does it last - . Is the patient on any of the following medications -   If Yes DO EKG - Needs done every 6 months    Do you have any edema - swelling in No    If Yes - CHECK TO SEE IF THE EDEMA IS PITTING  How long have they been having edema -   If Yes - Have they worn compression stockings   If they have worn compression stockings      Vein \"LEG PROBLEM Questionnaire\"  1. Do you have prominent leg veins? No   2. Do you have any skin discoloration? No  3. Do you have any healed or active sores? No  4. Do you have swelling of the legs? No  5. Do you have a family history of varicose veins? No  6. Does your profession involve pro-longed        standing or heavy lifting? No  7. Have you been fighting overweight problems? No  8. Do you have restless legs? No  9. Do you have any night time cramps? No  10.  Do you have any of the following in your legs:             When did you have your last labs drawn 7/6/2021  Where did you have them done   What doctor ordered     If we do not have these labs you are retrieve these labs for these providers! Do you have a surgery or procedure scheduled in the near future - No  If Yes- DO EKG  If Yes - Who is the surgery with?    Phone number of physician   Fax number of physician   Type of surgery   GIVE THIS INFORMATION TO MAGDALENO HURST     Ask patient if they want to sign up for MyChart if they are not already signed up     Check to see if we have an E-MAIL on file for the patient     Check medication list thoroughly!!! AND RECONCILE OUTSIDE MEDICATIONS  If dose has changed change the entire order not just the [place] At check out add to every patient's \"wrap up\" the following dot phrase AFTERHOURSEDUCATION and ensure we explain this to our patients

## 2021-12-03 ENCOUNTER — TELEPHONE (OUTPATIENT)
Dept: CARDIOLOGY CLINIC | Age: 66
End: 2021-12-03

## 2021-12-03 ENCOUNTER — TELEPHONE (OUTPATIENT)
Dept: FAMILY MEDICINE CLINIC | Age: 66
End: 2021-12-03

## 2021-12-03 NOTE — TELEPHONE ENCOUNTER
Per Jigar Singer NP: 600 mg max dose but please try to avoid NSAID     Patient advised and voices understanding.

## 2021-12-03 NOTE — TELEPHONE ENCOUNTER
----- Message from Keyaerick Beal sent at 12/3/2021 11:45 AM EST -----  Subject: Message to Provider    QUESTIONS  Information for Provider? pt would like a recommendations for a back   doctor please advise   ---------------------------------------------------------------------------  --------------  CALL BACK INFO  What is the best way for the office to contact you? OK to leave message on   voicemail  Preferred Call Back Phone Number?  [phone]  ---------------------------------------------------------------------------  --------------  SCRIPT ANSWERS  undefined

## 2021-12-03 NOTE — TELEPHONE ENCOUNTER
Pt called and states he had a back injury and wants to know how much Ibuprofen can he take in 24 hours. Pt states he is on blood thinners also.  Please advise

## 2021-12-06 ENCOUNTER — OFFICE VISIT (OUTPATIENT)
Dept: FAMILY MEDICINE CLINIC | Age: 66
End: 2021-12-06
Payer: MEDICARE

## 2021-12-06 VITALS
OXYGEN SATURATION: 96 % | HEART RATE: 65 BPM | HEIGHT: 70 IN | SYSTOLIC BLOOD PRESSURE: 132 MMHG | DIASTOLIC BLOOD PRESSURE: 82 MMHG | BODY MASS INDEX: 37.37 KG/M2 | WEIGHT: 261 LBS

## 2021-12-06 DIAGNOSIS — S39.012A STRAIN OF LUMBAR REGION, INITIAL ENCOUNTER: Primary | ICD-10-CM

## 2021-12-06 DIAGNOSIS — E11.9 TYPE 2 DIABETES MELLITUS WITHOUT COMPLICATION, WITHOUT LONG-TERM CURRENT USE OF INSULIN (HCC): ICD-10-CM

## 2021-12-06 PROCEDURE — 1036F TOBACCO NON-USER: CPT | Performed by: FAMILY MEDICINE

## 2021-12-06 PROCEDURE — 99213 OFFICE O/P EST LOW 20 MIN: CPT | Performed by: FAMILY MEDICINE

## 2021-12-06 PROCEDURE — 4040F PNEUMOC VAC/ADMIN/RCVD: CPT | Performed by: FAMILY MEDICINE

## 2021-12-06 PROCEDURE — 3017F COLORECTAL CA SCREEN DOC REV: CPT | Performed by: FAMILY MEDICINE

## 2021-12-06 PROCEDURE — G8427 DOCREV CUR MEDS BY ELIG CLIN: HCPCS | Performed by: FAMILY MEDICINE

## 2021-12-06 PROCEDURE — 2022F DILAT RTA XM EVC RTNOPTHY: CPT | Performed by: FAMILY MEDICINE

## 2021-12-06 PROCEDURE — G8484 FLU IMMUNIZE NO ADMIN: HCPCS | Performed by: FAMILY MEDICINE

## 2021-12-06 PROCEDURE — 1123F ACP DISCUSS/DSCN MKR DOCD: CPT | Performed by: FAMILY MEDICINE

## 2021-12-06 PROCEDURE — 3044F HG A1C LEVEL LT 7.0%: CPT | Performed by: FAMILY MEDICINE

## 2021-12-06 PROCEDURE — G8417 CALC BMI ABV UP PARAM F/U: HCPCS | Performed by: FAMILY MEDICINE

## 2021-12-06 RX ORDER — PREDNISONE 20 MG/1
TABLET ORAL
Qty: 10 TABLET | Refills: 0 | Status: SHIPPED | OUTPATIENT
Start: 2021-12-06 | End: 2022-01-04 | Stop reason: ALTCHOICE

## 2021-12-06 NOTE — PROGRESS NOTES
10/19/2021    S/p aortic valve replacement (23mm Medtronic Mosaic valve inserted 12/15/2017). No evidence of stenosis (mean P mmHg). Mild perivalvular leak. There was no thrombus noted in the left atrial appendage. Negative bubble study. No PFO or ASD noted.  Hx of colonic polyp     Hx of Doppler echocardiogram 2018    EF50-55%,possible stenosis bioprosthetic valve possible perivalval leak,    Hyperlipidemia     Hypertension     Murmur, cardiac     Obesity     S/P AVR 12/15/2017    with cabg x1 svg-pda       FAMILY HISTORY  Family History   Problem Relation Age of Onset    Stroke Mother     Arthritis Mother     Early Death Father         Suicide in his 42's    Other Sister         Parkinson    High Blood Pressure Sister        SOCIAL HISTORY  Social History     Socioeconomic History    Marital status:      Spouse name: None    Number of children: None    Years of education: None    Highest education level: None   Occupational History    None   Tobacco Use    Smoking status: Former Smoker     Packs/day: 1.00     Years: 24.00     Pack years: 24.00     Start date:      Quit date:      Years since quittin.9    Smokeless tobacco: Never Used   Vaping Use    Vaping Use: Never used   Substance and Sexual Activity    Alcohol use: Yes     Alcohol/week: 3.0 standard drinks     Types: 3 Cans of beer per week    Drug use: No    Sexual activity: Never   Other Topics Concern    None   Social History Narrative    None     Social Determinants of Health     Financial Resource Strain:     Difficulty of Paying Living Expenses: Not on file   Food Insecurity:     Worried About Running Out of Food in the Last Year: Not on file    Lorene of Food in the Last Year: Not on file   Transportation Needs:     Lack of Transportation (Medical): Not on file    Lack of Transportation (Non-Medical):  Not on file   Physical Activity:     Days of Exercise per Week: Not on file    Minutes times daily 180 tablet 3    vitamin C (ASCORBIC ACID) 500 MG tablet Take 500 mg by mouth daily      Melatonin 5 MG CAPS Take 1 tablet by mouth nightly as needed       Multiple Vitamins-Minerals (PX SENIOR VITAMIN PO) Take 1 tablet by mouth daily       No current facility-administered medications for this visit. ALLERGIES  No Known Allergies    PHYSICAL EXAM    /82   Pulse 65   Ht 5' 10\" (1.778 m)   Wt 261 lb (118.4 kg)   SpO2 96%   BMI 37.45 kg/m²     Physical Exam  Vitals and nursing note reviewed. Constitutional:       Appearance: Normal appearance. Musculoskeletal:      Comments: Full range of motion of his lower back  There is not really localized tenderness straight leg raising is negative         ASSESSMENT and PLAN    Dilcia Figueroa was seen today for back pain. Diagnoses and all orders for this visit:    Strain of lumbar region, initial encounter    Type 2 diabetes mellitus without complication, without long-term current use of insulin ([address]    Other orders  -     predniSONE (DELTASONE) 20 MG tablet; 2 tabs daily for 5 days    Patient has a lumbar strain possibly a sciatic component  We will give him a short course of prednisone  His cardiologist that he was authorized to take low-dose ibuprofen up to 600 mg per dose  Flexeril as needed    If no better in 10 days needs reevaluation    Return if symptoms worsen or fail to improve. Electronically signed by Brigido King MD on 12/6/2021    Please note that this chart was generated using dragon dictation software. Although every effort was made to ensure the accuracy of this automated transcription, some errors in transcription may have occurred.

## 2021-12-07 ENCOUNTER — TELEPHONE (OUTPATIENT)
Dept: FAMILY MEDICINE CLINIC | Age: 66
End: 2021-12-07

## 2021-12-07 RX ORDER — CYCLOBENZAPRINE HCL 5 MG
5 TABLET ORAL 3 TIMES DAILY PRN
Qty: 30 TABLET | Refills: 0 | Status: SHIPPED | OUTPATIENT
Start: 2021-12-07 | End: 2022-06-29 | Stop reason: SDUPTHER

## 2021-12-07 NOTE — TELEPHONE ENCOUNTER
Patient was seen in the office 12-7-21 and the Flexeril was not sent to pharmacy.  Please send today to [facility]

## 2021-12-07 NOTE — TELEPHONE ENCOUNTER
Please advise. I do not see that this patient has ever been on this medication in the past Did you start him on this medication during his visit?

## 2021-12-27 DIAGNOSIS — E11.9 TYPE 2 DIABETES MELLITUS WITHOUT COMPLICATION, WITHOUT LONG-TERM CURRENT USE OF INSULIN (HCC): ICD-10-CM

## 2021-12-27 DIAGNOSIS — Z12.5 SCREENING FOR MALIGNANT NEOPLASM OF PROSTATE: Primary | ICD-10-CM

## 2021-12-27 DIAGNOSIS — Z12.5 SCREENING FOR MALIGNANT NEOPLASM OF PROSTATE: ICD-10-CM

## 2021-12-27 LAB
A/G RATIO: 1.6 (ref 1.1–2.2)
ALBUMIN SERPL-MCNC: 4.4 G/DL (ref 3.4–5)
ALP BLD-CCNC: 61 U/L (ref 40–129)
ALT SERPL-CCNC: 19 U/L (ref 10–40)
ANION GAP SERPL CALCULATED.3IONS-SCNC: 17 MMOL/L (ref 3–16)
AST SERPL-CCNC: 16 U/L (ref 15–37)
BILIRUB SERPL-MCNC: 0.8 MG/DL (ref 0–1)
BUN BLDV-MCNC: 17 MG/DL (ref 7–20)
CALCIUM SERPL-MCNC: 9.1 MG/DL (ref 8.3–10.6)
CHLORIDE BLD-SCNC: 101 MMOL/L (ref 99–110)
CHOLESTEROL, TOTAL: 139 MG/DL (ref 0–199)
CO2: 22 MMOL/L (ref 21–32)
CREAT SERPL-MCNC: 0.9 MG/DL (ref 0.8–1.3)
ESTIMATED AVERAGE GLUCOSE: 154.2 MG/DL
GFR AFRICAN AMERICAN: >60
GFR NON-AFRICAN AMERICAN: >60
GLUCOSE BLD-MCNC: 171 MG/DL (ref 70–99)
HBA1C MFR BLD: 7 %
HDLC SERPL-MCNC: 38 MG/DL (ref 40–60)
LDL CHOLESTEROL CALCULATED: 81 MG/DL
POTASSIUM SERPL-SCNC: 4.7 MMOL/L (ref 3.5–5.1)
PROSTATE SPECIFIC ANTIGEN: 0.34 NG/ML (ref 0–4)
SODIUM BLD-SCNC: 140 MMOL/L (ref 136–145)
TOTAL PROTEIN: 7.2 G/DL (ref 6.4–8.2)
TRIGL SERPL-MCNC: 102 MG/DL (ref 0–150)
VLDLC SERPL CALC-MCNC: 20 MG/DL

## 2021-12-28 RX ORDER — APIXABAN 5 MG/1
TABLET, FILM COATED ORAL
Qty: 180 TABLET | Refills: 1 | Status: SHIPPED | OUTPATIENT
Start: 2021-12-28 | End: 2022-06-22

## 2021-12-28 RX ORDER — ATORVASTATIN CALCIUM 10 MG/1
TABLET, FILM COATED ORAL
Qty: 90 TABLET | Refills: 1 | Status: SHIPPED | OUTPATIENT
Start: 2021-12-28 | End: 2022-06-22

## 2022-01-04 ENCOUNTER — OFFICE VISIT (OUTPATIENT)
Dept: FAMILY MEDICINE CLINIC | Age: 67
End: 2022-01-04
Payer: MEDICARE

## 2022-01-04 VITALS
BODY MASS INDEX: 37.65 KG/M2 | HEART RATE: 59 BPM | HEIGHT: 70 IN | WEIGHT: 263 LBS | OXYGEN SATURATION: 96 % | DIASTOLIC BLOOD PRESSURE: 70 MMHG | SYSTOLIC BLOOD PRESSURE: 136 MMHG

## 2022-01-04 DIAGNOSIS — I48.3 TYPICAL ATRIAL FLUTTER (HCC): ICD-10-CM

## 2022-01-04 DIAGNOSIS — E78.5 HYPERLIPIDEMIA, UNSPECIFIED HYPERLIPIDEMIA TYPE: ICD-10-CM

## 2022-01-04 DIAGNOSIS — E11.9 TYPE 2 DIABETES MELLITUS WITHOUT COMPLICATION, WITHOUT LONG-TERM CURRENT USE OF INSULIN (HCC): Primary | ICD-10-CM

## 2022-01-04 DIAGNOSIS — S39.012A STRAIN OF LUMBAR REGION, INITIAL ENCOUNTER: ICD-10-CM

## 2022-01-04 PROCEDURE — G8427 DOCREV CUR MEDS BY ELIG CLIN: HCPCS | Performed by: FAMILY MEDICINE

## 2022-01-04 PROCEDURE — 4040F PNEUMOC VAC/ADMIN/RCVD: CPT | Performed by: FAMILY MEDICINE

## 2022-01-04 PROCEDURE — 3017F COLORECTAL CA SCREEN DOC REV: CPT | Performed by: FAMILY MEDICINE

## 2022-01-04 PROCEDURE — 3046F HEMOGLOBIN A1C LEVEL >9.0%: CPT | Performed by: FAMILY MEDICINE

## 2022-01-04 PROCEDURE — 1036F TOBACCO NON-USER: CPT | Performed by: FAMILY MEDICINE

## 2022-01-04 PROCEDURE — 1123F ACP DISCUSS/DSCN MKR DOCD: CPT | Performed by: FAMILY MEDICINE

## 2022-01-04 PROCEDURE — G8417 CALC BMI ABV UP PARAM F/U: HCPCS | Performed by: FAMILY MEDICINE

## 2022-01-04 PROCEDURE — 99214 OFFICE O/P EST MOD 30 MIN: CPT | Performed by: FAMILY MEDICINE

## 2022-01-04 PROCEDURE — G8484 FLU IMMUNIZE NO ADMIN: HCPCS | Performed by: FAMILY MEDICINE

## 2022-01-04 PROCEDURE — 2022F DILAT RTA XM EVC RTNOPTHY: CPT | Performed by: FAMILY MEDICINE

## 2022-01-04 NOTE — PROGRESS NOTES
2022     Georgia Closs      Chief Complaint   Patient presents with    6 Month Follow-Up    Back Pain     pt reports pain in the lower back still present       HPI      Yasmin Carroll is a 78 y.o. male who presents today with the followin. Type 2 diabetes mellitus without complication, without long-term current use of insulin ([facility])    2. Strain of lumbar region, initial encounter    3. Typical atrial flutter ([address]    4. Hyperlipidemia, unspecified hyperlipidemia type    Here for routine check  He had labs done this week which he wanted to review  He has gained about 5 pounds    REVIEW OF SYMPTOMS    Review of Systems   Cardiovascular:        Extensive cardiac history  He has had valvular heart disease and valve replacement as well as coronary bypass surgery  He is on Eliquis  Hypertension hyperlipidemia well managed       Endocrine:        Type 2 diabetes on oral agents and diet   Musculoskeletal:        He has been having pain in his right lumbosacral area  It has very minimal radiation to his leg  He took a steroid burst which helped somewhat he has had this for a few months       PAST MEDICAL HISTORY  Past Medical History:   Diagnosis Date    A-fib ([address]     Arthritis     Knees, Feet    Atrial fibrillation ([address]     Back pain     Lower Back    CAD (coronary artery disease)     Diabetes mellitus ([address]     H/O echocardiogram 2017    EF 50-55%. severe aortic stenosis is present, mod AR, mild MR and TR    H/O echocardiogram 10/07/2021    EF 55-60%. Mild mitral and tricuspid regurgitation is present.     History of benign prostatic hyperplasia     History of cardiac cath 2017    Severe AS, PDA MID 80% disease, RCA 50% distal stenosis, AVR + CABG VS TAVR and PCI, CTS CONS    History of echocardiogram 2019    EF 55-60%, Mildly dilated left atrium, Bioprosthetic aortic valve noted with increased velocities indicating stenosis, Mild paravalvular leak, Mitral annular calcification noted without evidence of stenosis    History of exercise stress test 2018    treadmill    History of Holter monitoring 02/15/2018    48 hour holter monitor  One short run of afib.  History of transesophageal echocardiography (ADIEL) 10/19/2021    S/p aortic valve replacement (23mm Medtronic Mosaic valve inserted 12/15/2017). No evidence of stenosis (mean P mmHg). Mild perivalvular leak. There was no thrombus noted in the left atrial appendage. Negative bubble study. No PFO or ASD noted.  Hx of colonic polyp     Hx of Doppler echocardiogram 2018    EF50-55%,possible stenosis bioprosthetic valve possible perivalval leak,    Hyperlipidemia     Hypertension     Murmur, cardiac     Obesity     S/P AVR 12/15/2017    with cabg x1 svg-pda       FAMILY HISTORY  Family History   Problem Relation Age of Onset    Stroke Mother     Arthritis Mother     Early Death Father         Suicide in his 42's    Other Sister         Parkinson    High Blood Pressure Sister        SOCIAL HISTORY  Social History     Socioeconomic History    Marital status:      Spouse name: None    Number of children: None    Years of education: None    Highest education level: None   Occupational History    None   Tobacco Use    Smoking status: Former Smoker     Packs/day: 1.00     Years: 24.00     Pack years: 24.00     Start date:      Quit date:      Years since quittin.0    Smokeless tobacco: Never Used   Vaping Use    Vaping Use: Never used   Substance and Sexual Activity    Alcohol use:  Yes     Alcohol/week: 3.0 standard drinks     Types: 3 Cans of beer per week    Drug use: No    Sexual activity: Never   Other Topics Concern    None   Social History Narrative    None     Social Determinants of Health     Financial Resource Strain:     Difficulty of Paying Living Expenses: Not on file   Food Insecurity:     Worried About Running Out of Food in the Last Year: Not on file    Lorene oliva [employer] in the Last Year: Not on file   Transportation Needs:     Lack of Transportation (Medical): Not on file    Lack of Transportation (Non-Medical): Not on file   Physical Activity:     Days of Exercise per Week: Not on file    Minutes of Exercise per Session: Not on file   Stress:     Feeling of Stress : Not on file   Social Connections:     Frequency of Communication with Friends and Family: Not on file    Frequency of Social Gatherings with Friends and Family: Not on file    Attends Taoist Services: Not on file    Active Member of Clubs or Organizations: Not on file    Attends Club or Organization Meetings: Not on file    Marital Status: Not on file   Intimate Partner Violence:     Fear of Current or Ex-Partner: Not on file    Emotionally Abused: Not on file    Physically Abused: Not on file    Sexually Abused: Not on file   Housing Stability:     Unable to Pay for Housing in the Last Year: Not on file    Number of Places Lived in the Last Year: Not on file    Unstable Housing in the Last Year: Not on file        SURGICAL HISTORY  Past Surgical History:   Procedure Laterality Date    AORTIC VALVE REPLACEMENT  12/15/2017    with cabg x1 svg-pda    APPENDECTOMY  2013    COLONOSCOPY  9/16/16    Diverticulosis, polyp x1    HERNIA REPAIR  2014    with Mesh    HERNIA REPAIR  2018    JOINT REPLACEMENT Left 2014    TKA    KNEE CARTILAGE SURGERY Left     URACHAL CYST INCISION      Partial Bladder removal and Appendectomy    VENTRAL HERNIA REPAIR  4/7/16    laprascopic       CURRENT MEDICATIONS  Current Outpatient Medications   Medication Sig Dispense Refill    atorvastatin (LIPITOR) 10 MG tablet TAKE 1 TABLET BY MOUTH EVERY DAY 90 tablet 1    ELIQUIS 5 MG TABS tablet TAKE 1 TABLET BY MOUTH 2 TIMES DAILY 180 tablet 1    aspirin 81 MG EC tablet TAKE 1 TABLET BY MOUTH EVERY DAY 90 tablet 1    hydrocortisone 2.5 % cream Apply topically 2 times daily.  1 Tube 1    metFORMIN (GLUCOPHAGE-XR) 500 MG extended release tablet TAKE 4 TABLETS BY MOUTH DAILY (WITH BREAKFAST) 360 tablet 1    carvedilol (COREG) 25 MG tablet Take 1 tablet by mouth 2 times daily 180 tablet 3    vitamin C (ASCORBIC ACID) 500 MG tablet Take 500 mg by mouth daily      Melatonin 5 MG CAPS Take 1 tablet by mouth nightly as needed       Multiple Vitamins-Minerals (PX SENIOR VITAMIN PO) Take 1 tablet by mouth daily       No current facility-administered medications for this visit. ALLERGIES  No Known Allergies    PHYSICAL EXAM    /70   Pulse 59   Ht 5' 10\" (1.778 m)   Wt 263 lb (119.3 kg)   SpO2 96%   BMI 37.74 kg/m²     Physical Exam  Vitals and nursing note reviewed. Constitutional:       Appearance: Normal appearance. Cardiovascular:      Rate and Rhythm: Normal rate. Pulmonary:      Effort: Pulmonary effort is normal.     Reviewed his recent labs    A1c is gone up to 7.0    Lipids okay except low HDL  Immunizations are current          ASSESSMENT and PLAN    Yumiko Sepulveda was seen today for 6 month follow-up and back pain. Diagnoses and all orders for this visit:    Type 2 diabetes mellitus without complication, without long-term current use of insulin ([facility])  -     Comprehensive Metabolic Panel; Future  -     Hemoglobin A1C; Future  -     Lipid Panel; Future  -     Microalbumin / Creatinine Urine Ratio; Future    Strain of lumbar region, initial encounter  -     [facility] - [place]    Typical atrial flutter ([address]    Hyperlipidemia, unspecified hyperlipidemia type    Refer to physical therapy for the back problem  Continue same medicine the diet  Try to lose weight  Follow-up in 6 months and get labs prior to that office visit    Return in about 6 months (around 7/4/2022). Electronically signed by Ze Ariza MD on 1/4/2022    Please note that this chart was generated using dragon dictation software.   Although every effort was made to ensure the accuracy of this automated transcription, some errors in transcription may have occurred.

## 2022-01-12 ENCOUNTER — HOSPITAL ENCOUNTER (OUTPATIENT)
Dept: PHYSICAL THERAPY | Age: 67
Setting detail: THERAPIES SERIES
Discharge: HOME | End: 2022-01-12
Payer: MEDICARE

## 2022-01-12 PROCEDURE — 97162 PT EVAL MOD COMPLEX 30 MIN: CPT

## 2022-01-12 PROCEDURE — 97110 THERAPEUTIC EXERCISES: CPT

## 2022-01-12 ASSESSMENT — PAIN SCALES - GENERAL: PAINLEVEL_OUTOF10: 1

## 2022-01-12 NOTE — PROGRESS NOTES
Physical Therapy  Initial Assessment  Date: 2022  Patient Name: Shelly Cunningham  MRN: [id number]  : 1955     Treatment Diagnosis: Lumbar strain, instability. Restrictions       Subjective   General  Additional Pertinent Hx: ONset 10-12 yrs ago, bending, twisting and lifting at the same time had pain for several months, resolved and returned about a year later. Resolved. About year ago fell off ladder and had back pain and BLE symptoms. Improved with steroids with improvement, still has some RLE symptoms to shin. Hernia repair and abdominal surgery, heart surgery. Referring Practitioner: Dr. Agustina Eduardo  Referral Date : 22  Diagnosis: strain of lumbar region  PT Visit Information  PT Insurance Information: [facility] medicare  Subjective  Subjective: central low back pain. intermittent RLE symptoms. does get worse with standing prolonged periods, intermittent pain with transfers. Pain Screening  Patient Currently in Pain: Yes  Pain Assessment  Pain Assessment: 0-10  Pain Level: 1  Vital Signs  Patient Currently in Pain: Yes    Vision/Hearing  Vision  Vision: Impaired  Hearing  Hearing: Within functional limits    Orientation  Orientation  Overall Orientation Status: Within Functional Limits    Social/Functional History  Social/Functional History  ADL Assistance: Independent  Homemaking Assistance: Independent  Homemaking Responsibilities: Yes  Ambulation Assistance: Independent  Transfer Assistance: Independent  Active : Yes  Occupation: Retired    Objective     Observation/Palpation  Palpation: non tender  Observation: obese, resting tremor in head/neck,    AROM RLE (degrees)  RLE General AROM: lacking full knee extension  Spine  Lumbar: flexion to ankle, extension 15 deg. BSB mild limited. Special Tests: restricted THOMAS and FADIR B, Fadir more limited on L.   SLR neg HS limited    Strength RLE  Comment: myotomes intact  Strength LLE  Comment: myotomes intact     Additional Measures  Special Tests: Limited THOMAS, FADIR, SLR HS limited. Other: Lacking full knee extension on R. Assessment   Conditions Requiring Skilled Therapeutic Intervention  Assessment: Pt presents with resolving low back pain. He notes history of multiple injuries, most recent was about a year ago when he fell off a ladder. Symptoms did improve with recent course of steroids. Relates R shin pain with prolonged fitness walking and occ. R thigh symptom with transfers. History of multiple abdominal surgeries including hernia repair. Myotomes intact, TROM is mildly limited, hip mobility limited. Noted some instability with dynamic movements. Treatment Diagnosis: Lumbar strain, instability. REQUIRES PT FOLLOW UP: Yes (hold for now.)            Goals  Long term goals  Time Frame for Long term goals : hold for now. Pt to call with any problems or lack of continued progress.   Patient Goals   Patient goals : resolve the reamining pain          Jackeline Burks, PT

## 2022-01-12 NOTE — FLOWSHEET NOTE
Outpatient Physical Therapy  [place]           [x] Phone: [phone]   Fax: [phone]  [place] [place]           [] Phone: [phone]   Fax: [phone]        Physical Therapy Daily Treatment Note  Date:  2022    Patient Name:  Carter Giang    :  1955  MRN: [id number]  Restrictions/Precautions:    Diagnosis:   Diagnosis: strain of lumbar region  Date of Injury/Surgery:   Treatment Diagnosis: Treatment Diagnosis: Lumbar strain, instability. Insurance/Certification information: PT Insurance Information: [facility] medicare   Referring Physician:  Referring Practitioner: Dr. Arthur Hawk  Next Doctor Visit:    Plan of care signed (Y/N):    Outcome Measure:   Visit# / total visits:  1 /hold for now  Pain level: -2/10   Goals:     Patient goals : resolve the reamining pain     Long term goals  Time Frame for Long term goals : hold for now. Pt to call with any problems or lack of continued progress. Summary of Evaluation: Assessment: Pt presents with resolving low back pain. He notes history of multiple injuries, most recent was about a year ago when he fell off a ladder. Symptoms did improve with recent course of steroids. Relates R shin pain with prolonged fitness walking and occ. R thigh symptom with transfers. History of multiple abdominal surgeries including hernia repair. Myotomes intact, TROM is mildly limited, hip mobility limited. Noted some instability with dynamic movements. Subjective:  See eval         Any changes in Ambulatory Summary Sheet?   None        Objective:  See eval   COVID screening questions were asked and patient attested that there had been no contact or symptoms        Exercises: (No more than 4 columns)   Exercise/Equipment Date Date Date           WARM UP                     TABLE                                       STANDING                                                     PROPRIOCEPTION                                    MODALITIES Other Therapeutic Activities/Education:        Home Exercise Program:  Access Code: [email or web address]  URL: [email or web address]  Date: 01/12/2022  Prepared by: Gardenia Cone    Exercises  Supine Bridge -   Supine Piriformis Stretch with Leg Straight   Supine Transversus Abdominis Bracing - Hands on Stomach -       Manual Treatments:  SI mobilization    Modalities:        Communication with other providers:        Assessment:  (Response towards treatment session) (Pain Rating)    Assessment: Pt presents with resolving low back pain. He notes history of multiple injuries, most recent was about a year ago when he fell off a ladder. Symptoms did improve with recent course of steroids. Relates R shin pain with prolonged fitness walking and occ. R thigh symptom with transfers. History of multiple abdominal surgeries including hernia repair. Myotomes intact, TROM is mildly limited, hip mobility limited. Noted some instability with dynamic movements.       Plan for Next Session:        Time In / Time Out:    0916/0227       Timed Code/Total Treatment Minutes:  10/40      Next Progress Note due:        Plan of Care Interventions:  [x] Therapeutic Exercise  [] Modalities:  [x] Therapeutic Activity     [] Ultrasound  [] Estim  [] Gait Training      [] Cervical Traction [] Lumbar Traction  [x] Neuromuscular Re-education    [] Cold/hotpack [] Iontophoresis   [x] Instruction in HEP      [] Vasopneumatic   [] Dry Needling    [x] Manual Therapy               [] Aquatic Therapy              Electronically signed by:  Maximo Oates PT, 1/12/2022, 10:55 AM

## 2022-01-12 NOTE — PLAN OF CARE
Outpatient Physical Therapy                  [x] Phone: [phone]   Fax: [phone]    Pediatric Therapy                                    [] Phone: [phone]   Fax: [phone]  [facility]                                      [] Phone: [phone]   Fax: [phone]      To: Referring Practitioner: Dr. Cary Marin    From: Christiano Chauhan, PT, PT     Patient: Georgia Closs       : 1955  Diagnosis: strain of lumbar region   Treatment Diagnosis: Lumbar strain, instability. Date: 2022    Physical Therapy Certification/Re-Certification Form  Dear Dr. Cary Marin  The following patient has been evaluated for physical therapy services and for therapy to continue, Please review the attached evaluation and/or summary of the patient's plan of care, and verify that you agree therapy should continue by signing the attached document and sending it back to our office. Patient is a  77 yo male who presents with resolving low back pain which impacts on prolonged standing;patient's goal is toresolve pain ;patient reports that pain  limits activities including prolonged standing; PT to address patient's goals, impairments and activity limitations with skilled interventions checked in plan of care;patient's level of function prior to onset of pain was independent; did not observe any barriers to learning during PT eval; learning preferences include demonstration, practice, and handouts; patient expressed understanding of HEP; patient appears to be motivated to participate in an active PT program and to be compliant with HEP expectations;patient assisted in developing treatment plan and goals; no DME is currently being used;      Current functional level (based on Oswestry )   :6% disablity    Assessment:  Assessment: Pt presents with resolving low back pain. He notes history of multiple injuries, most recent was about a year ago when he fell off a ladder.   Symptoms did improve with recent course of steroids. Relates R shin pain with prolonged fitness walking and occ. R thigh symptom with transfers. History of multiple abdominal surgeries including hernia repair. Myotomes intact, TROM is mildly limited, hip mobility limited. Noted some instability with dynamic movements. Plan of Care/Treatment to date:  [x] Therapeutic Exercise    [] Aquatics:  [x] Therapeutic Activity    [] Ultrasound  [] Elec Stimulation  [] Gait Training     [] Cervical Traction [] Lumbar Traction  [x] Neuromuscular Re-education [] Cold/hotpack [] Iontophoresis   [x] Instruction in HEP       [x] Manual Therapy     [] vasopneumatic            [] Self care home management        []Dry needling trigger point point/pain management          Frequency/Duration:  Hold for now. # Days per week: [] 1 day # Weeks: [] 1 week [] 5 weeks     [] 2 days   [] 2 weeks [] 6 weeks     [] 3 days   [] 3 weeks [] 7 weeks     [] 4 days   [] 4 weeks [] 8 weeks    Rehab Potential/Progress: [x] Excellent [] Good [] Fair  [] Poor     Goals:       Long term goals  Time Frame for Long term goals : hold for now. Pt to call with any problems or lack of continued progress. Electronically signed by:  Nav Luz, PT,  1/12/2022, 10:57 AM              If you have any questions or concerns, please don't hesitate to call.   Thank you for your referral.      Physician Signature:_________________Date:____________Time: ________  By signing above, therapists plan is approved by physician

## 2022-02-25 RX ORDER — CARVEDILOL 25 MG/1
25 TABLET ORAL 2 TIMES DAILY
Qty: 180 TABLET | Refills: 3 | Status: SHIPPED | OUTPATIENT
Start: 2022-02-25 | End: 2022-07-08 | Stop reason: SDUPTHER

## 2022-02-28 RX ORDER — METFORMIN HYDROCHLORIDE 500 MG/1
2000 TABLET, EXTENDED RELEASE ORAL
Qty: 360 TABLET | Refills: 1 | Status: SHIPPED | OUTPATIENT
Start: 2022-02-28 | End: 2022-08-15 | Stop reason: SDUPTHER

## 2022-04-07 NOTE — PLAN OF CARE
Outpatient Physical Therapy                  [x] Phone: [phone]   Fax: [phone]    Pediatric Therapy                                    [] Phone: [phone]   Fax: [phone]  [facility]ediatric [facility]                                      [] Phone: [phone]   Fax: [phone]      To: Referring Practitioner: Dr. Nicole Navarro    From: Sheree Stephens PT, PT     Patient: Lori Niño       : 1955  Diagnosis: strain of lumbar region   Treatment Diagnosis: Lumbar strain, instability. Date: 2022    Discharge  Dear Dr. Nicole Navarro  The following patient has been evaluated for physical therapy services and for therapy to continue, Please review the attached evaluation and/or summary of the patient's plan of care, and verify that you agree therapy should continue by signing the attached document and sending it back to our office. Patient is a  77 yo male who presents with resolving low back pain which impacts on prolonged standing;patient's goal is toresolve pain ;patient reports that pain  limits activities including prolonged standing; PT to address patient's goals, impairments and activity limitations with skilled interventions checked in plan of care;patient's level of function prior to onset of pain was independent; did not observe any barriers to learning during PT eval; learning preferences include demonstration, practice, and handouts; patient expressed understanding of HEP; patient appears to be motivated to participate in an active PT program and to be compliant with HEP expectations;patient assisted in developing treatment plan and goals; no DME is currently being used;      Current functional level (based on Oswestry )   :6% disablity    Assessment:  Assessment: Pt presents with resolving low back pain. He notes history of multiple injuries, most recent was about a year ago when he fell off a ladder. Symptoms did improve with recent course of steroids.   Relates R shin pain with prolonged fitness walking and occ. R thigh symptom with transfers. History of multiple abdominal surgeries including hernia repair. Myotomes intact, TROM is mildly limited, hip mobility limited. Noted some instability with dynamic movements. Plan of Care/Treatment to date:  [x] Therapeutic Exercise    [] Aquatics:  [x] Therapeutic Activity    [] Ultrasound  [] Elec Stimulation  [] Gait Training     [] Cervical Traction [] Lumbar Traction  [x] Neuromuscular Re-education [] Cold/hotpack [] Iontophoresis   [x] Instruction in HEP       [x] Manual Therapy     [] vasopneumatic            [] Self care home management        []Dry needling trigger point point/pain management          Hold period , pt did not call, did not return. Long term goals  Time Frame for Long term goals : hold for now. Pt to call with any problems or lack of continued progress. Electronically signed by:  Ilan Motley PT,  2022, 9:46 AM              If you have any questions or concerns, please don't hesitate to call.   Thank you for your referral.

## 2022-05-03 ENCOUNTER — OFFICE VISIT (OUTPATIENT)
Dept: CARDIOLOGY CLINIC | Age: 67
End: 2022-05-03
Payer: MEDICARE

## 2022-05-03 VITALS
WEIGHT: 262.2 LBS | SYSTOLIC BLOOD PRESSURE: 136 MMHG | BODY MASS INDEX: 37.54 KG/M2 | HEART RATE: 63 BPM | DIASTOLIC BLOOD PRESSURE: 82 MMHG | HEIGHT: 70 IN

## 2022-05-03 DIAGNOSIS — I25.10 CORONARY ARTERY DISEASE INVOLVING NATIVE CORONARY ARTERY OF NATIVE HEART WITHOUT ANGINA PECTORIS: ICD-10-CM

## 2022-05-03 DIAGNOSIS — E66.01 SEVERE OBESITY (BMI 35.0-39.9) WITH COMORBIDITY (HCC): ICD-10-CM

## 2022-05-03 DIAGNOSIS — I10 PRIMARY HYPERTENSION: ICD-10-CM

## 2022-05-03 DIAGNOSIS — I48.3 TYPICAL ATRIAL FLUTTER (HCC): ICD-10-CM

## 2022-05-03 DIAGNOSIS — E78.5 HYPERLIPIDEMIA, UNSPECIFIED HYPERLIPIDEMIA TYPE: ICD-10-CM

## 2022-05-03 DIAGNOSIS — I38 VHD (VALVULAR HEART DISEASE): ICD-10-CM

## 2022-05-03 PROCEDURE — G8427 DOCREV CUR MEDS BY ELIG CLIN: HCPCS | Performed by: NURSE PRACTITIONER

## 2022-05-03 PROCEDURE — 1036F TOBACCO NON-USER: CPT | Performed by: NURSE PRACTITIONER

## 2022-05-03 PROCEDURE — 4040F PNEUMOC VAC/ADMIN/RCVD: CPT | Performed by: NURSE PRACTITIONER

## 2022-05-03 PROCEDURE — 3017F COLORECTAL CA SCREEN DOC REV: CPT | Performed by: NURSE PRACTITIONER

## 2022-05-03 PROCEDURE — 1123F ACP DISCUSS/DSCN MKR DOCD: CPT | Performed by: NURSE PRACTITIONER

## 2022-05-03 PROCEDURE — 99214 OFFICE O/P EST MOD 30 MIN: CPT | Performed by: NURSE PRACTITIONER

## 2022-05-03 PROCEDURE — 93000 ELECTROCARDIOGRAM COMPLETE: CPT | Performed by: NURSE PRACTITIONER

## 2022-05-03 PROCEDURE — G8417 CALC BMI ABV UP PARAM F/U: HCPCS | Performed by: NURSE PRACTITIONER

## 2022-05-03 ASSESSMENT — ENCOUNTER SYMPTOMS
SHORTNESS OF BREATH: 0
ORTHOPNEA: 0

## 2022-05-03 NOTE — PROGRESS NOTES
5/3/2022  Primary cardiologist: Dr. Janes Devi:   Mary Gomez  is an established 78 y.o.  male here for a 6 month follow up on ASVCD- VHD- atrial fib/atrial flutter - HTN -HLD      SUBJECTIVE/OBJECTIVE:  Mary Gomez is a 78 y.o. male with a history of coronary artery disease s/p CABG x1, valvular disease s/p AVR, atrial fibrillation/atrial flutter, hypertension and hyperlipidemia.  In December 2017 he underwent CABG x1 with SVG to PDA and aortic valve replacement with a number 23 mm Medtronic Mosaic valve.  He has had previous history of atrial fibrillation/flutter status post cardioversion.  He underwent cardioversion of atrial flutter in March 2021 as well. HPI :   Mary Gomez reports he is feeling well. He denies chest pain or shortness of breath. He denies palpitations. He is walking for exercise. He drinks 2 beers a day. Review of Systems   Constitutional: Negative for diaphoresis and malaise/fatigue. Cardiovascular: Negative for chest pain, claudication, dyspnea on exertion, irregular heartbeat, leg swelling, near-syncope, orthopnea, palpitations and paroxysmal nocturnal dyspnea. Respiratory: Negative for shortness of breath. Musculoskeletal: Positive for joint pain (right knee). Neurological: Negative for dizziness and light-headedness. Vitals:    05/03/22 1442   BP: 136/82   Site: Left Upper Arm   Position: Sitting   Cuff Size: Large Adult   Pulse: 63   Weight: 262 lb 3.2 oz (118.9 kg)   Height: 5' 10\" (1.778 m)     Patient-Reported Vitals 6/9/2020   Patient-Reported Weight 247lb   Patient-Reported Height 71\"   Patient-Reported Systolic 645   Patient-Reported Diastolic 80   Patient-Reported Pulse 64     Wt Readings from Last 3 Encounters:   05/03/22 262 lb 3.2 oz (118.9 kg)   01/04/22 263 lb (119.3 kg)   12/06/21 261 lb (118.4 kg)     Body mass index is 37.62 kg/m². Physical Exam  Vitals reviewed. Constitutional:       Appearance: He is obese. HENT:      Head: Normocephalic.    Eyes: Extraocular Movements: Extraocular movements intact. Pupils: Pupils are equal, round, and reactive to light. Neck:      Vascular: No carotid bruit. Cardiovascular:      Rate and Rhythm: Normal rate and regular rhythm. Pulses: Normal pulses. Heart sounds: Murmur heard. Systolic murmur is present with a grade of 2/6. Pulmonary:      Effort: Pulmonary effort is normal.      Breath sounds: Normal breath sounds. Abdominal:      General: There is no distension. Tenderness: There is no abdominal tenderness. Musculoskeletal:      Right lower leg: No edema. Left lower leg: No edema. Skin:     General: Skin is warm and dry. Capillary Refill: Capillary refill takes less than 2 seconds. Neurological:      General: No focal deficit present. Mental Status: He is alert. Psychiatric:         Mood and Affect: Mood normal.                Current Outpatient Medications   Medication Sig Dispense Refill    metFORMIN (GLUCOPHAGE-XR) 500 MG extended release tablet TAKE 4 TABLETS BY MOUTH DAILY (WITH BREAKFAST) 360 tablet 1    carvedilol (COREG) 25 MG tablet Take 1 tablet by mouth 2 times daily 180 tablet 3    atorvastatin (LIPITOR) 10 MG tablet TAKE 1 TABLET BY MOUTH EVERY DAY 90 tablet 1    ELIQUIS 5 MG TABS tablet TAKE 1 TABLET BY MOUTH 2 TIMES DAILY 180 tablet 1    aspirin 81 MG EC tablet TAKE 1 TABLET BY MOUTH EVERY DAY 90 tablet 1    hydrocortisone 2.5 % cream Apply topically 2 times daily. 1 Tube 1    vitamin C (ASCORBIC ACID) 500 MG tablet Take 1,000 mg by mouth 2 times daily       Melatonin 5 MG CAPS Take 1 tablet by mouth nightly as needed       Multiple Vitamins-Minerals (PX SENIOR VITAMIN PO) Take 1 tablet by mouth daily       No current facility-administered medications for this visit. All pertinent data reviewed and discussed with patient       ASSESSMENT/PLAN:    1.  Coronary artery disease involving native coronary artery of native heart without angina pectoris  Stable - without symptoms: Recommend to continue risk factor modification and medical management  Continue with asa - coreg and atorvastatin-stable on current dose      2. VHD (valvular heart disease)  Aortic valve replaced with a #23 Medtronic Mosaic valve placed 2017. Echocardiogram reveals no evidence of stenosis but mild perivalvular leak. There is a systolic murmur grade 2/6 appreciated. Recommend recheck echocardiogram prior to next office visit. - ECHO Complete 2D W Doppler W Color; Future    3. Typical atrial flutter /atrial fibrillation  Regular rate regular rhythm: Denies palpitations, lightheadedness or dizziness: Stable  Currently on Eliquis for stroke prevention: denies bleeding or falls-stable on present dose : Recommend to continue  EKG sinus rhythm  Recommend discontinue alcohol Use    4. Primary hypertension  Blood pressure is well controlled with use of carvedilol: Recommend to continue current doses patient is stable    5. Hyperlipidemia, unspecified hyperlipidemia type  Results for Dori Boston (MRN [id number])   Ref. Range 12/27/2021 09:10   CHOLESTEROL, TOTAL, [id number] Latest Ref Range: 0 - 199 mg/dL 139   HDL Cholesterol Latest Ref Range: 40 - 60 mg/dL 38 (L)   LDL Calculated Latest Ref Range: <100 mg/dL 81   Triglycerides Latest Ref Range: 0 - 150 mg/dL 102   VLDL Cholesterol Calculated Latest Ref Range: Not Established mg/dL 20     Lipids are near goal.  HDL low. LDL recommend goal of 70:  recommend to continue atorvastatin 10 mg once daily. Encouraged to follow a low-fat low-cholesterol diet       6. Severe obesity (BMI 35.0-39. 9) with comorbidity (HCC)  BMI 37.62 class II obesity  Weight loss is encouraged      Medications reviewed and confirmed with patient         Tests ordered:  echo  Follow-up  6 months      Signed:  Estela Davis, TASHA - CNP, 5/3/2022, 3:01 PM    An electronic signature was used to authenticate this note.     Please note this report has been partially produced using speech recognition software and may contain errors related to that system including errors in grammar, punctuation, and spelling, as well as words and phrases that may be inappropriate. If there are any questions or concerns please feel free to contact the dictating provider for clarification.

## 2022-05-20 LAB — DIABETIC RETINOPATHY: NEGATIVE

## 2022-06-22 RX ORDER — ATORVASTATIN CALCIUM 10 MG/1
TABLET, FILM COATED ORAL
Qty: 90 TABLET | Refills: 1 | Status: SHIPPED | OUTPATIENT
Start: 2022-06-22

## 2022-06-22 RX ORDER — APIXABAN 5 MG/1
TABLET, FILM COATED ORAL
Qty: 180 TABLET | Refills: 1 | Status: SHIPPED | OUTPATIENT
Start: 2022-06-22

## 2022-06-29 DIAGNOSIS — E11.9 TYPE 2 DIABETES MELLITUS WITHOUT COMPLICATION, WITHOUT LONG-TERM CURRENT USE OF INSULIN (HCC): ICD-10-CM

## 2022-06-29 LAB
A/G RATIO: 1.6 (ref 1.1–2.2)
ALBUMIN SERPL-MCNC: 4.1 G/DL (ref 3.4–5)
ALP BLD-CCNC: 68 U/L (ref 40–129)
ALT SERPL-CCNC: 18 U/L (ref 10–40)
ANION GAP SERPL CALCULATED.3IONS-SCNC: 11 MMOL/L (ref 3–16)
AST SERPL-CCNC: 18 U/L (ref 15–37)
BILIRUB SERPL-MCNC: 0.6 MG/DL (ref 0–1)
BUN BLDV-MCNC: 15 MG/DL (ref 7–20)
CALCIUM SERPL-MCNC: 8.9 MG/DL (ref 8.3–10.6)
CHLORIDE BLD-SCNC: 103 MMOL/L (ref 99–110)
CHOLESTEROL, TOTAL: 116 MG/DL (ref 0–199)
CO2: 24 MMOL/L (ref 21–32)
CREAT SERPL-MCNC: 0.8 MG/DL (ref 0.8–1.3)
CREATININE URINE: 127.4 MG/DL (ref 39–259)
GFR AFRICAN AMERICAN: >60
GFR NON-AFRICAN AMERICAN: >60
GLUCOSE BLD-MCNC: 185 MG/DL (ref 70–99)
HDLC SERPL-MCNC: 39 MG/DL (ref 40–60)
LDL CHOLESTEROL CALCULATED: 65 MG/DL
MICROALBUMIN UR-MCNC: 5.2 MG/DL
MICROALBUMIN/CREAT UR-RTO: 40.8 MG/G (ref 0–30)
POTASSIUM SERPL-SCNC: 5 MMOL/L (ref 3.5–5.1)
SODIUM BLD-SCNC: 138 MMOL/L (ref 136–145)
TOTAL PROTEIN: 6.7 G/DL (ref 6.4–8.2)
TRIGL SERPL-MCNC: 61 MG/DL (ref 0–150)
VLDLC SERPL CALC-MCNC: 12 MG/DL

## 2022-06-29 RX ORDER — ASPIRIN 81 MG/1
TABLET, COATED ORAL
Qty: 90 TABLET | Refills: 1 | Status: SHIPPED | OUTPATIENT
Start: 2022-06-29

## 2022-06-29 RX ORDER — CYCLOBENZAPRINE HCL 5 MG
5 TABLET ORAL 3 TIMES DAILY PRN
Qty: 30 TABLET | Refills: 0 | Status: SHIPPED | OUTPATIENT
Start: 2022-06-29 | End: 2022-07-09

## 2022-06-29 NOTE — TELEPHONE ENCOUNTER
Jodi Calero is needing cyclobenzaprin 5 mg tablet refilled. Needs to go to [facility].  He only has a few left to last.   lv 1/2/22   nv 7/8/22   Thank you

## 2022-06-30 LAB
ESTIMATED AVERAGE GLUCOSE: 151.3 MG/DL
HBA1C MFR BLD: 6.9 %

## 2022-07-08 ENCOUNTER — OFFICE VISIT (OUTPATIENT)
Dept: FAMILY MEDICINE CLINIC | Age: 67
End: 2022-07-08
Payer: MEDICARE

## 2022-07-08 VITALS
BODY MASS INDEX: 36.65 KG/M2 | WEIGHT: 256 LBS | HEART RATE: 75 BPM | DIASTOLIC BLOOD PRESSURE: 74 MMHG | OXYGEN SATURATION: 96 % | HEIGHT: 70 IN | SYSTOLIC BLOOD PRESSURE: 124 MMHG | RESPIRATION RATE: 16 BRPM

## 2022-07-08 DIAGNOSIS — N40.0 BENIGN PROSTATIC HYPERPLASIA, UNSPECIFIED WHETHER LOWER URINARY TRACT SYMPTOMS PRESENT: ICD-10-CM

## 2022-07-08 DIAGNOSIS — I10 ESSENTIAL HYPERTENSION: ICD-10-CM

## 2022-07-08 DIAGNOSIS — Z12.5 ENCOUNTER FOR SCREENING FOR MALIGNANT NEOPLASM OF PROSTATE: ICD-10-CM

## 2022-07-08 DIAGNOSIS — E78.5 HYPERLIPIDEMIA, UNSPECIFIED HYPERLIPIDEMIA TYPE: ICD-10-CM

## 2022-07-08 DIAGNOSIS — Z95.2 S/P AVR (AORTIC VALVE REPLACEMENT): ICD-10-CM

## 2022-07-08 DIAGNOSIS — I50.32 CHRONIC DIASTOLIC HEART FAILURE (HCC): ICD-10-CM

## 2022-07-08 DIAGNOSIS — E11.9 TYPE 2 DIABETES MELLITUS WITHOUT COMPLICATION, WITHOUT LONG-TERM CURRENT USE OF INSULIN (HCC): Primary | ICD-10-CM

## 2022-07-08 PROCEDURE — 3044F HG A1C LEVEL LT 7.0%: CPT | Performed by: STUDENT IN AN ORGANIZED HEALTH CARE EDUCATION/TRAINING PROGRAM

## 2022-07-08 PROCEDURE — 99214 OFFICE O/P EST MOD 30 MIN: CPT | Performed by: STUDENT IN AN ORGANIZED HEALTH CARE EDUCATION/TRAINING PROGRAM

## 2022-07-08 PROCEDURE — 1123F ACP DISCUSS/DSCN MKR DOCD: CPT | Performed by: STUDENT IN AN ORGANIZED HEALTH CARE EDUCATION/TRAINING PROGRAM

## 2022-07-08 PROCEDURE — G8417 CALC BMI ABV UP PARAM F/U: HCPCS | Performed by: STUDENT IN AN ORGANIZED HEALTH CARE EDUCATION/TRAINING PROGRAM

## 2022-07-08 PROCEDURE — G8427 DOCREV CUR MEDS BY ELIG CLIN: HCPCS | Performed by: STUDENT IN AN ORGANIZED HEALTH CARE EDUCATION/TRAINING PROGRAM

## 2022-07-08 PROCEDURE — 1036F TOBACCO NON-USER: CPT | Performed by: STUDENT IN AN ORGANIZED HEALTH CARE EDUCATION/TRAINING PROGRAM

## 2022-07-08 PROCEDURE — 3017F COLORECTAL CA SCREEN DOC REV: CPT | Performed by: STUDENT IN AN ORGANIZED HEALTH CARE EDUCATION/TRAINING PROGRAM

## 2022-07-08 PROCEDURE — 2022F DILAT RTA XM EVC RTNOPTHY: CPT | Performed by: STUDENT IN AN ORGANIZED HEALTH CARE EDUCATION/TRAINING PROGRAM

## 2022-07-08 RX ORDER — CARVEDILOL 25 MG/1
25 TABLET ORAL 2 TIMES DAILY
Qty: 180 TABLET | Refills: 1 | Status: SHIPPED | OUTPATIENT
Start: 2022-07-08 | End: 2022-10-06

## 2022-07-08 ASSESSMENT — ENCOUNTER SYMPTOMS
SHORTNESS OF BREATH: 0
NAUSEA: 0
ABDOMINAL PAIN: 0
WHEEZING: 0
SORE THROAT: 0

## 2022-07-08 NOTE — PROGRESS NOTES
7/8/2022    Maribell Oliveira    Chief Complaint   Patient presents with    6 Month Follow-Up     Presenting for 6 month follow up visit. HPI  History was obtained from patient. Miguel Scott is a 75 y.o. male with a PMHx as listed below who presents today for 6 month follow up    Acute complaint of back pain back pain originally starting October after taking item off truck 150 pounds. Acute flare recently XR negative [facility] negative and given steroid      Follows with Dr. Catrina granados. CABG, valve replacement (bovine)  Follows with Dr. Elba Dawson for possible MRI. Currently on NSAIDs warned about     1. Type 2 diabetes mellitus without complication, without long-term current use of insulin ([address]    2. Chronic diastolic heart failure ([address]    3. S/P AVR (aortic valve replacement)    4. Hyperlipidemia, unspecified hyperlipidemia type    5. Essential hypertension    6. Benign prostatic hyperplasia, unspecified whether lower urinary tract symptoms present    7. Encounter for screening for malignant neoplasm of prostate              REVIEW OF SYMPTOMS    Review of Systems   Constitutional: Negative for chills and fatigue. HENT: Negative for congestion and sore throat. Respiratory: Negative for shortness of breath and wheezing. Cardiovascular: Negative for chest pain and palpitations. Gastrointestinal: Negative for abdominal pain and nausea. Genitourinary: Negative for frequency and urgency. Neurological: Negative for light-headedness. PAST MEDICAL HISTORY  Past Medical History:   Diagnosis Date    A-fib [facility])     Arthritis     Knees, Feet    Atrial fibrillation (HCC)     Back pain     Lower Back    CAD (coronary artery disease)     Diabetes mellitus ([address]     H/O echocardiogram 12/14/2017    EF 50-55%. severe aortic stenosis is present, mod AR, mild MR and TR    H/O echocardiogram 10/07/2021    EF 55-60%. Mild mitral and tricuspid regurgitation is present.     History of benign prostatic hyperplasia     History of cardiac cath 2017    Severe AS, PDA MID 80% disease, RCA 50% distal stenosis, AVR + CABG VS TAVR and PCI, CTS CONS    History of echocardiogram 2019    EF 55-60%, Mildly dilated left atrium, Bioprosthetic aortic valve noted with increased velocities indicating stenosis, Mild paravalvular leak, Mitral annular calcification noted without evidence of stenosis    History of exercise stress test 2018    treadmill    History of Holter monitoring 02/15/2018    48 hour holter monitor  One short run of afib.  History of transesophageal echocardiography ([facility]) 10/19/2021    S/p aortic valve replacement (23mm Medtronic Mosaic valve inserted 12/15/2017). No evidence of stenosis (mean P mmHg). Mild perivalvular leak. There was no thrombus noted in the left atrial appendage. Negative bubble study. No PFO or ASD noted.  Hx of colonic polyp     Hx of Doppler echocardiogram 2018    EF50-55%,possible stenosis bioprosthetic valve possible perivalval leak,    Hyperlipidemia     Hypertension     Murmur, cardiac     Obesity     S/P AVR 12/15/2017    with cabg x1 svg-pda       FAMILY HISTORY  Family History   Problem Relation Age of Onset    Stroke Mother     Arthritis Mother     Early Death Father         Suicide in his 42's    Other Sister         Parkinson    High Blood Pressure Sister        SOCIAL HISTORY  Social History     Socioeconomic History    Marital status:      Spouse name: None    Number of children: None    Years of education: None    Highest education level: None   Occupational History    None   Tobacco Use    Smoking status: Former Smoker     Packs/day: 1.00     Years: 24.00     Pack years: 24.00     Start date: 5     Quit date:      Years since quittin.5    Smokeless tobacco: Never Used   Vaping Use    Vaping Use: Never used   Substance and Sexual Activity    Alcohol use:  Yes     Alcohol/week: 2.0 standard drinks     Types: 2 Cans of beer per week     Comment: 2 cans daily    Drug use: No    Sexual activity: Never   Other Topics Concern    None   Social History Narrative    None     Social Determinants of Health     Financial Resource Strain:     Difficulty of Paying Living Expenses: Not on file   Food Insecurity:     Worried About Running Out of Food in the Last Year: Not on file    [place] of Food in the Last Year: Not on file   Transportation Needs:     Lack of Transportation (Medical): Not on file    Lack of Transportation (Non-Medical):  Not on file   Physical Activity:     Days of Exercise per Week: Not on file    Minutes of Exercise per Session: Not on file   Stress:     Feeling of Stress : Not on file   Social Connections:     Frequency of Communication with Friends and Family: Not on file    Frequency of Social Gatherings with Friends and Family: Not on file    Attends Jewish Services: Not on file    Active Member of [address] [place] [address] or Organizations: Not on file    Attends Club or Organization Meetings: Not on file    Marital Status: Not on file   Intimate Partner Violence:     Fear of Current or Ex-Partner: Not on file    Emotionally Abused: Not on file    Physically Abused: Not on file    Sexually Abused: Not on file   Housing Stability:     Unable to Pay for Housing in the Last Year: Not on file    Number of [place] in the Last Year: Not on file    Unstable Housing in the Last Year: Not on file        SURGICAL HISTORY  Past Surgical History:   Procedure Laterality Date    AORTIC VALVE REPLACEMENT  12/15/2017    with cabg x1 svg-pda    APPENDECTOMY  2013    COLONOSCOPY  9/16/16    Diverticulosis, polyp x1    HERNIA REPAIR  2014    with Mesh    HERNIA REPAIR  2018    JOINT REPLACEMENT Left 2014    TKA    KNEE CARTILAGE SURGERY Left     URACHAL CYST INCISION      Partial Bladder removal and Appendectomy    VENTRAL HERNIA REPAIR  4/7/16    laprascopic CURRENT MEDICATIONS  Current Outpatient Medications   Medication Sig Dispense Refill    MILK THISTLE PO Take by mouth      carvedilol (COREG) 25 MG tablet Take 1 tablet by mouth 2 times daily 180 tablet 1    ASPIRIN LOW DOSE 81 MG EC tablet TAKE 1 TABLET BY MOUTH EVERY DAY 90 tablet 1    cyclobenzaprine (FLEXERIL) 5 MG tablet Take 1 tablet by mouth 3 times daily as needed for Muscle spasms (Patient taking differently: Take 10 mg by mouth 3 times daily as needed for Muscle spasms Dr. Rony Salmon increased dose to 10mg) 30 tablet 0    atorvastatin (LIPITOR) 10 MG tablet TAKE 1 TABLET BY MOUTH EVERY DAY 90 tablet 1    ELIQUIS 5 MG TABS tablet TAKE 1 TABLET BY MOUTH TWICE A  tablet 1    metFORMIN (GLUCOPHAGE-XR) 500 MG extended release tablet TAKE 4 TABLETS BY MOUTH DAILY (WITH BREAKFAST) 360 tablet 1    hydrocortisone 2.5 % cream Apply topically 2 times daily. 1 Tube 1    Melatonin 5 MG CAPS Take 1 tablet by mouth nightly as needed       Multiple Vitamins-Minerals (PX SENIOR VITAMIN PO) Take 1 tablet by mouth daily       No current facility-administered medications for this visit. ALLERGIES  No Known Allergies    PHYSICAL EXAM    /74   Pulse 75   Resp 16   Ht 5' 10\" (1.778 m)   Wt 256 lb (116.1 kg)   SpO2 96%   BMI 36.73 kg/m²     Physical Exam  Constitutional:       Appearance: Normal appearance. HENT:      Head: Normocephalic and atraumatic. Eyes:      Extraocular Movements: Extraocular movements intact. Pupils: Pupils are equal, round, and reactive to light. Cardiovascular:      Rate and Rhythm: Normal rate and regular rhythm. Pulses: Normal pulses. Heart sounds: No murmur heard. No friction rub. No gallop. Comments: murmur  Skin:     General: Skin is warm and dry. Neurological:      General: No focal deficit present. Mental Status: He is alert.    Psychiatric:         Mood and Affect: Mood normal.         Behavior: Behavior normal. ASSESSMENT & PLAN    1. Type 2 diabetes mellitus without complication, without long-term current use of insulin (HCC)    - Hemoglobin A1C; Future    2. Chronic diastolic heart failure ([address]      3. S/P AVR (aortic valve replacement)    - carvedilol (COREG) 25 MG tablet; Take 1 tablet by mouth 2 times daily  Dispense: 180 tablet; Refill: 1    4. Hyperlipidemia, unspecified hyperlipidemia type    - LIPID PANEL; Future  - Hemoglobin A1C; Future    5. Essential hypertension    - Comprehensive Metabolic Panel; Future  - CBC with Auto Differential; Future    6. Benign prostatic hyperplasia, unspecified whether lower urinary tract symptoms present    - PSA Screening; Future    7. Encounter for screening for malignant neoplasm of prostate     - PSA Screening; Future    DM2 well controlleed  Chronic back pain following with Dr. Zoie Pina for MRI discussed minimize or stop use of NSAIDs, consider pain management/PT  BP stable  Lipids well controlled    No follow-ups on file.          Electronically signed by Anahi Barrientos DO on 7/8/2022

## 2022-07-21 ENCOUNTER — HOSPITAL ENCOUNTER (OUTPATIENT)
Dept: MRI IMAGING | Age: 67
Discharge: HOME | End: 2022-07-21
Payer: MEDICARE

## 2022-07-21 DIAGNOSIS — M47.26 OTHER SPONDYLOSIS WITH RADICULOPATHY, LUMBAR REGION: ICD-10-CM

## 2022-07-21 PROCEDURE — 72148 MRI LUMBAR SPINE W/O DYE: CPT

## 2022-08-15 RX ORDER — METFORMIN HYDROCHLORIDE 500 MG/1
2000 TABLET, EXTENDED RELEASE ORAL
Qty: 360 TABLET | Refills: 1 | Status: SHIPPED | OUTPATIENT
Start: 2022-08-15

## 2022-09-23 ENCOUNTER — TELEPHONE (OUTPATIENT)
Dept: CARDIOLOGY CLINIC | Age: 67
End: 2022-09-23

## 2022-09-23 NOTE — TELEPHONE ENCOUNTER
Cardiologist: Dr. Caden Ricks  Surgeon: Dr. Corinne Eldridge  Surgery: Right total knee arthroplasty  Anesthesia: Spinal  Date: TBD  FAX# [phone]    Ph# [phone]    Last OV 5/3/2022 w/Madhavi      1. Coronary artery disease involving native coronary artery of native heart without angina pectoris  Stable - without symptoms: Recommend to continue risk factor modification and medical management  Continue with asa - coreg and atorvastatin-stable on current dose        2. VHD (valvular heart disease)  Aortic valve replaced with a #23 Medtronic Mosaic valve placed 2017. Echocardiogram reveals no evidence of stenosis but mild perivalvular leak. There is a systolic murmur grade 2/6 appreciated. Recommend recheck echocardiogram prior to next office visit. - ECHO Complete 2D W Doppler W Color; Future     3. Typical atrial flutter /atrial fibrillation  Regular rate regular rhythm: Denies palpitations, lightheadedness or dizziness: Stable  Currently on Eliquis for stroke prevention: denies bleeding or falls-stable on present dose : Recommend to continue  EKG sinus rhythm  Recommend discontinue alcohol Use     4. Primary hypertension  Blood pressure is well controlled with use of carvedilol: Recommend to continue current doses patient is stable     5. Hyperlipidemia, unspecified hyperlipidemia type    Lipids are near goal.  HDL low. LDL recommend goal of 70:  recommend to continue atorvastatin 10 mg once daily. Encouraged to follow a low-fat low-cholesterol diet         6. Severe obesity (BMI 35.0-39. 9) with comorbidity ([address]  BMI 37.62 class II obesity  Weight loss is encouraged        Last EKG- 5/3/2022      Stress Test- 4/13/2018    Echo- 10/7/2021   Left ventricular function and size is normal, EF is estimated at 55-60%. Mild left ventricular hypertrophy. Grade I diastolic dysfunction. No regional wall motion abnormalities were detected. Bi atrial enlargement noted.    Bioprosthetic aortic valve is noted with increase velocities indicating   stenosis, mean gradient 42mmHg,which compared to 2/2019 study increased from   41mmHg to 42mmHg. AT 77msec. Mild paravalvular leak noted. Mitral annular calcification is present. Posterior leaflet appears to be   calcified. Mild mitral stenosis with Mean gradient of 2mmHg. Mild mitral and tricuspid regurgitation is present. RVSP is 22 mmHg. No evidence of pericardial effusion.    OV TO DISCUSS ECHO FINDINGS      Aortic valve replacement- 12/2017      Eliquis    Aspirin

## 2022-11-03 ENCOUNTER — PROCEDURE VISIT (OUTPATIENT)
Dept: CARDIOLOGY CLINIC | Age: 67
End: 2022-11-03
Payer: MEDICARE

## 2022-11-03 DIAGNOSIS — I38 VHD (VALVULAR HEART DISEASE): ICD-10-CM

## 2022-11-03 LAB
LV EF: 58 %
LVEF MODALITY: NORMAL

## 2022-11-03 PROCEDURE — 93306 TTE W/DOPPLER COMPLETE: CPT | Performed by: INTERNAL MEDICINE

## 2022-11-10 ENCOUNTER — TELEPHONE (OUTPATIENT)
Dept: CARDIOLOGY CLINIC | Age: 67
End: 2022-11-10

## 2022-11-23 ENCOUNTER — OFFICE VISIT (OUTPATIENT)
Dept: CARDIOLOGY CLINIC | Age: 67
End: 2022-11-23
Payer: MEDICARE

## 2022-11-23 VITALS
HEIGHT: 70 IN | OXYGEN SATURATION: 97 % | DIASTOLIC BLOOD PRESSURE: 78 MMHG | BODY MASS INDEX: 37.19 KG/M2 | SYSTOLIC BLOOD PRESSURE: 130 MMHG | WEIGHT: 259.8 LBS | HEART RATE: 84 BPM

## 2022-11-23 DIAGNOSIS — E78.2 MIXED HYPERLIPIDEMIA: ICD-10-CM

## 2022-11-23 DIAGNOSIS — I48.3 TYPICAL ATRIAL FLUTTER (HCC): Primary | ICD-10-CM

## 2022-11-23 DIAGNOSIS — E11.9 TYPE 2 DIABETES MELLITUS WITHOUT COMPLICATION, WITHOUT LONG-TERM CURRENT USE OF INSULIN (HCC): ICD-10-CM

## 2022-11-23 DIAGNOSIS — Z95.1 S/P CABG X 1: ICD-10-CM

## 2022-11-23 DIAGNOSIS — I38 VHD (VALVULAR HEART DISEASE): ICD-10-CM

## 2022-11-23 DIAGNOSIS — I48.91 ATRIAL FIBRILLATION BY ELECTROCARDIOGRAM (HCC): ICD-10-CM

## 2022-11-23 DIAGNOSIS — E66.01 CLASS 2 SEVERE OBESITY DUE TO EXCESS CALORIES WITH SERIOUS COMORBIDITY AND BODY MASS INDEX (BMI) OF 37.0 TO 37.9 IN ADULT (HCC): ICD-10-CM

## 2022-11-23 DIAGNOSIS — I25.10 CORONARY ARTERY DISEASE INVOLVING NATIVE CORONARY ARTERY OF NATIVE HEART WITHOUT ANGINA PECTORIS: ICD-10-CM

## 2022-11-23 DIAGNOSIS — Z95.2 S/P AVR (AORTIC VALVE REPLACEMENT): ICD-10-CM

## 2022-11-23 DIAGNOSIS — I10 PRIMARY HYPERTENSION: ICD-10-CM

## 2022-11-23 PROCEDURE — G8417 CALC BMI ABV UP PARAM F/U: HCPCS | Performed by: NURSE PRACTITIONER

## 2022-11-23 PROCEDURE — G8427 DOCREV CUR MEDS BY ELIG CLIN: HCPCS | Performed by: NURSE PRACTITIONER

## 2022-11-23 PROCEDURE — 1123F ACP DISCUSS/DSCN MKR DOCD: CPT | Performed by: NURSE PRACTITIONER

## 2022-11-23 PROCEDURE — 3078F DIAST BP <80 MM HG: CPT | Performed by: NURSE PRACTITIONER

## 2022-11-23 PROCEDURE — 99214 OFFICE O/P EST MOD 30 MIN: CPT | Performed by: NURSE PRACTITIONER

## 2022-11-23 PROCEDURE — 3074F SYST BP LT 130 MM HG: CPT | Performed by: NURSE PRACTITIONER

## 2022-11-23 PROCEDURE — G8484 FLU IMMUNIZE NO ADMIN: HCPCS | Performed by: NURSE PRACTITIONER

## 2022-11-23 PROCEDURE — 2022F DILAT RTA XM EVC RTNOPTHY: CPT | Performed by: NURSE PRACTITIONER

## 2022-11-23 PROCEDURE — 3044F HG A1C LEVEL LT 7.0%: CPT | Performed by: NURSE PRACTITIONER

## 2022-11-23 PROCEDURE — 3017F COLORECTAL CA SCREEN DOC REV: CPT | Performed by: NURSE PRACTITIONER

## 2022-11-23 PROCEDURE — 1036F TOBACCO NON-USER: CPT | Performed by: NURSE PRACTITIONER

## 2022-11-23 ASSESSMENT — ENCOUNTER SYMPTOMS
ORTHOPNEA: 0
SHORTNESS OF BREATH: 0

## 2022-11-23 NOTE — PROGRESS NOTES
11/23/2022  Primary cardiologist: Dr. Pedro Gagnon:   Sammie Feliciano  is an established 75 y.o.  male here for a 6 month follow up on ASVCD- VHD- atrial fib/atrial flutter - HTN -HLD      SUBJECTIVE/OBJECTIVE:  Sammie Feliciano is a 75 y.o. male with a history of coronary artery disease s/p CABG x1, valvular disease s/p AVR, atrial fibrillation/atrial flutter, hypertension and hyperlipidemia. In December 2017 he underwent CABG x1 with SVG to PDA and aortic valve replacement with a number 23 mm Medtronic Mosaic valve. He has had previous history of atrial fibrillation/flutter status post cardioversion. He underwent cardioversion of atrial flutter in March 2021 as well. HPI :   Sammie Feliciano reports he is feeling well. Knees are still bothering him and he placed surgery on hold as his wife was diagnosed with cancer and the oncologists are trying to figure out the plan. He is walking for exercise. He drinks 2 beers a day. Review of Systems   Constitutional: Negative for diaphoresis and malaise/fatigue. Cardiovascular:  Negative for chest pain, claudication, dyspnea on exertion, irregular heartbeat, leg swelling, near-syncope, orthopnea, palpitations and paroxysmal nocturnal dyspnea. Respiratory:  Negative for shortness of breath. Musculoskeletal:  Positive for joint pain (right knee). Neurological:  Negative for dizziness and light-headedness. Vitals:    11/23/22 1507   BP: 130/78   Site: Left Upper Arm   Position: Sitting   Cuff Size: Large Adult   Pulse: 84   SpO2: 97%   Weight: 259 lb 12.8 oz (117.8 kg)   Height: 5' 10\" (1.778 m)     Patient-Reported Vitals 6/9/2020   Patient-Reported Weight 247lb   Patient-Reported Height 71\"   Patient-Reported Systolic 421   Patient-Reported Diastolic 80   Patient-Reported Pulse 64     Wt Readings from Last 3 Encounters:   11/23/22 259 lb 12.8 oz (117.8 kg)   07/21/22 250 lb (113.4 kg)   07/08/22 256 lb (116.1 kg)     Body mass index is 37.28 kg/m².     Physical Exam  Vitals reviewed. Constitutional:       Appearance: He is obese. HENT:      Head: Normocephalic. Eyes:      Extraocular Movements: Extraocular movements intact. Pupils: Pupils are equal, round, and reactive to light. Neck:      Vascular: No carotid bruit. Cardiovascular:      Rate and Rhythm: Normal rate and regular rhythm. Pulses: Normal pulses. Heart sounds: Murmur heard. Systolic murmur is present with a grade of 2/6. Pulmonary:      Effort: Pulmonary effort is normal.      Breath sounds: Normal breath sounds. Abdominal:      General: There is no distension. Tenderness: There is no abdominal tenderness. Musculoskeletal:      Right lower leg: No edema. Left lower leg: No edema. Skin:     General: Skin is warm and dry. Capillary Refill: Capillary refill takes less than 2 seconds. Neurological:      General: No focal deficit present. Mental Status: He is alert. Psychiatric:         Mood and Affect: Mood normal.              Current Outpatient Medications   Medication Sig Dispense Refill    metFORMIN (GLUCOPHAGE-XR) 500 MG extended release tablet Take 4 tablets by mouth daily (with breakfast) 360 tablet 1    MILK THISTLE PO Take by mouth      carvedilol (COREG) 25 MG tablet Take 1 tablet by mouth 2 times daily 180 tablet 1    ASPIRIN LOW DOSE 81 MG EC tablet TAKE 1 TABLET BY MOUTH EVERY DAY 90 tablet 1    atorvastatin (LIPITOR) 10 MG tablet TAKE 1 TABLET BY MOUTH EVERY DAY 90 tablet 1    ELIQUIS 5 MG TABS tablet TAKE 1 TABLET BY MOUTH TWICE A  tablet 1    hydrocortisone 2.5 % cream Apply topically 2 times daily. 1 Tube 1    Melatonin 5 MG CAPS Take 1 tablet by mouth nightly as needed       Multiple Vitamins-Minerals (PX SENIOR VITAMIN PO) Take 1 tablet by mouth daily       No current facility-administered medications for this visit. All pertinent data reviewed and discussed with patient       ASSESSMENT/PLAN:    1.  Coronary artery disease involving native coronary artery of native heart without angina pectoris  Stable - without symptoms: Recommend to continue risk factor modification and medical management  Continue with asa - coreg and atorvastatin-stable on current dose  Last stress test 2018 was normal.   Reviewed diabetic neuropathy and recommend stress test in the next year. He is focusing on his wife right now, but will consider a stress test in the next year. 2. VHD (valvular heart disease)  Aortic valve replaced with a #23 Medtronic Mosaic valve placed 2017. Echocardiogram reveals no evidence of stenosis but mild perivalvular leak. There is a systolic murmur grade 2/6 appreciated. Recommend recheck echocardiogram yearly  Echo 11/2022  Summary   Left ventricular function is normal, EF 55-60%. Bilateral atrial dilatation. The bioprosthetic valve in the aortic position (23 mm Medtronic Mosaic valve inserted 12/15/2017) with a mean pressure gradient of 22 mmHg, and an acceleration time of 74 msec. Mild perivalvular leak. Mild mitral stenosis with a mean gradient of about 3 mmHg, with a MVA of 2.18 cm^2. Moderate mitral and mild-to-moderate tricuspid regurgitation is present. Mitral annular calcification is present. Mildly elevated pulmonary artery pressure, RVSP 35 mmHg. No evidence of pericardial effusion. 3. Typical atrial flutter /atrial fibrillation  Controlled  Regular rate regular rhythm: Denies palpitations, lightheadedness or dizziness: Stable  Currently on Eliquis for stroke prevention: denies bleeding or falls-stable on present dose : Recommend to continue  Recommend discontinue alcohol Use    4. Primary hypertension  Blood pressure is well controlled with use of carvedilol: Recommend to continue current doses patient is stable    5.  Hyperlipidemia, unspecified hyperlipidemia type   Latest Reference Range & Units 6/29/22 08:13   CHOLESTEROL, TOTAL, [id number] 0 - 199 mg/dL 116   HDL Cholesterol 40 - 60 mg/dL 39 (L)   LDL Calculated <100 mg/dL 65   Triglycerides 0 - 150 mg/dL 61   VLDL Cholesterol Calculated Not Established mg/dL 12   (L): Data is abnormally low    Lipids are at goal.  HDL low. LDL recommend goal of < 70 :  recommend to continue atorvastatin 10 mg once daily. Encouraged to follow a low-fat low-cholesterol diet       6. Severe obesity (BMI 35.0-39. 9) with comorbidity (HCC)  BMI 37.28 class II obesity  Weight loss is encouraged       Medications reviewed and confirmed with patient         Tests ordered:  none    Follow-up  6 months      Signed:  TASHA Weiss CNP, 11/23/2022, 3:18 PM    An electronic signature was used to authenticate this note. Please note this report has been partially produced using speech recognition software and may contain errors related to that system including errors in grammar, punctuation, and spelling, as well as words and phrases that may be inappropriate. If there are any questions or concerns please feel free to contact the dictating provider for clarification.

## 2022-12-14 RX ORDER — APIXABAN 5 MG/1
TABLET, FILM COATED ORAL
Qty: 180 TABLET | Refills: 1 | Status: SHIPPED | OUTPATIENT
Start: 2022-12-14

## 2022-12-14 RX ORDER — ATORVASTATIN CALCIUM 10 MG/1
TABLET, FILM COATED ORAL
Qty: 90 TABLET | Refills: 1 | Status: SHIPPED | OUTPATIENT
Start: 2022-12-14

## 2022-12-27 RX ORDER — ASPIRIN 81 MG/1
TABLET ORAL
Qty: 90 TABLET | Refills: 1 | Status: SHIPPED | OUTPATIENT
Start: 2022-12-27

## 2023-01-03 DIAGNOSIS — N40.0 BENIGN PROSTATIC HYPERPLASIA, UNSPECIFIED WHETHER LOWER URINARY TRACT SYMPTOMS PRESENT: ICD-10-CM

## 2023-01-03 DIAGNOSIS — I10 ESSENTIAL HYPERTENSION: ICD-10-CM

## 2023-01-03 DIAGNOSIS — E11.9 TYPE 2 DIABETES MELLITUS WITHOUT COMPLICATION, WITHOUT LONG-TERM CURRENT USE OF INSULIN (HCC): ICD-10-CM

## 2023-01-03 DIAGNOSIS — E78.5 HYPERLIPIDEMIA, UNSPECIFIED HYPERLIPIDEMIA TYPE: ICD-10-CM

## 2023-01-03 DIAGNOSIS — Z12.5 ENCOUNTER FOR SCREENING FOR MALIGNANT NEOPLASM OF PROSTATE: ICD-10-CM

## 2023-01-03 LAB
A/G RATIO: 1.6 (ref 1.1–2.2)
ALBUMIN SERPL-MCNC: 4.1 G/DL (ref 3.4–5)
ALP BLD-CCNC: 58 U/L (ref 40–129)
ALT SERPL-CCNC: 23 U/L (ref 10–40)
ANION GAP SERPL CALCULATED.3IONS-SCNC: 10 MMOL/L (ref 3–16)
AST SERPL-CCNC: 19 U/L (ref 15–37)
BASOPHILS ABSOLUTE: 0.1 K/UL (ref 0–0.2)
BASOPHILS RELATIVE PERCENT: 0.8 %
BILIRUB SERPL-MCNC: 0.8 MG/DL (ref 0–1)
BUN BLDV-MCNC: 16 MG/DL (ref 7–20)
CALCIUM SERPL-MCNC: 9.4 MG/DL (ref 8.3–10.6)
CHLORIDE BLD-SCNC: 100 MMOL/L (ref 99–110)
CHOLESTEROL, TOTAL: 122 MG/DL (ref 0–199)
CO2: 25 MMOL/L (ref 21–32)
CREAT SERPL-MCNC: 0.9 MG/DL (ref 0.8–1.3)
EOSINOPHILS ABSOLUTE: 0.1 K/UL (ref 0–0.6)
EOSINOPHILS RELATIVE PERCENT: 1.5 %
GFR SERPL CREATININE-BSD FRML MDRD: >60 ML/MIN/{1.73_M2}
GLUCOSE BLD-MCNC: 185 MG/DL (ref 70–99)
HCT VFR BLD CALC: 43.6 % (ref 40.5–52.5)
HDLC SERPL-MCNC: 37 MG/DL (ref 40–60)
HEMOGLOBIN: 14.9 G/DL (ref 13.5–17.5)
LDL CHOLESTEROL CALCULATED: 68 MG/DL
LYMPHOCYTES ABSOLUTE: 2.4 K/UL (ref 1–5.1)
LYMPHOCYTES RELATIVE PERCENT: 30.2 %
MCH RBC QN AUTO: 30.8 PG (ref 26–34)
MCHC RBC AUTO-ENTMCNC: 34.2 G/DL (ref 31–36)
MCV RBC AUTO: 90.1 FL (ref 80–100)
MONOCYTES ABSOLUTE: 0.5 K/UL (ref 0–1.3)
MONOCYTES RELATIVE PERCENT: 6.6 %
NEUTROPHILS ABSOLUTE: 4.9 K/UL (ref 1.7–7.7)
NEUTROPHILS RELATIVE PERCENT: 60.9 %
PDW BLD-RTO: 13.6 % (ref 12.4–15.4)
PLATELET # BLD: 270 K/UL (ref 135–450)
PMV BLD AUTO: 8.6 FL (ref 5–10.5)
POTASSIUM SERPL-SCNC: 4.9 MMOL/L (ref 3.5–5.1)
PROSTATE SPECIFIC ANTIGEN: 0.29 NG/ML (ref 0–4)
RBC # BLD: 4.84 M/UL (ref 4.2–5.9)
SODIUM BLD-SCNC: 135 MMOL/L (ref 136–145)
TOTAL PROTEIN: 6.6 G/DL (ref 6.4–8.2)
TRIGL SERPL-MCNC: 83 MG/DL (ref 0–150)
VLDLC SERPL CALC-MCNC: 17 MG/DL
WBC # BLD: 8 K/UL (ref 4–11)

## 2023-01-04 LAB
ESTIMATED AVERAGE GLUCOSE: 168.6 MG/DL
HBA1C MFR BLD: 7.5 %

## 2023-01-06 ENCOUNTER — OFFICE VISIT (OUTPATIENT)
Dept: FAMILY MEDICINE CLINIC | Age: 68
End: 2023-01-06
Payer: MEDICARE

## 2023-01-06 VITALS
BODY MASS INDEX: 36.65 KG/M2 | HEART RATE: 81 BPM | WEIGHT: 256 LBS | DIASTOLIC BLOOD PRESSURE: 84 MMHG | OXYGEN SATURATION: 98 % | HEIGHT: 70 IN | SYSTOLIC BLOOD PRESSURE: 132 MMHG

## 2023-01-06 DIAGNOSIS — B37.2 CANDIDA INFECTION OF FLEXURAL SKIN: ICD-10-CM

## 2023-01-06 DIAGNOSIS — Z95.2 S/P AVR (AORTIC VALVE REPLACEMENT): ICD-10-CM

## 2023-01-06 DIAGNOSIS — I48.91 ATRIAL FIBRILLATION BY ELECTROCARDIOGRAM (HCC): ICD-10-CM

## 2023-01-06 DIAGNOSIS — R39.12 BENIGN PROSTATIC HYPERPLASIA WITH WEAK URINARY STREAM: Chronic | ICD-10-CM

## 2023-01-06 DIAGNOSIS — E11.9 TYPE 2 DIABETES MELLITUS WITHOUT COMPLICATION, WITHOUT LONG-TERM CURRENT USE OF INSULIN (HCC): Primary | ICD-10-CM

## 2023-01-06 DIAGNOSIS — N40.1 BENIGN PROSTATIC HYPERPLASIA WITH WEAK URINARY STREAM: Chronic | ICD-10-CM

## 2023-01-06 DIAGNOSIS — I25.10 CORONARY ARTERY DISEASE INVOLVING NATIVE CORONARY ARTERY OF NATIVE HEART WITHOUT ANGINA PECTORIS: ICD-10-CM

## 2023-01-06 DIAGNOSIS — L20.82 FLEXURAL ECZEMA: ICD-10-CM

## 2023-01-06 PROCEDURE — 2022F DILAT RTA XM EVC RTNOPTHY: CPT | Performed by: STUDENT IN AN ORGANIZED HEALTH CARE EDUCATION/TRAINING PROGRAM

## 2023-01-06 PROCEDURE — G8484 FLU IMMUNIZE NO ADMIN: HCPCS | Performed by: STUDENT IN AN ORGANIZED HEALTH CARE EDUCATION/TRAINING PROGRAM

## 2023-01-06 PROCEDURE — 1036F TOBACCO NON-USER: CPT | Performed by: STUDENT IN AN ORGANIZED HEALTH CARE EDUCATION/TRAINING PROGRAM

## 2023-01-06 PROCEDURE — 3074F SYST BP LT 130 MM HG: CPT | Performed by: STUDENT IN AN ORGANIZED HEALTH CARE EDUCATION/TRAINING PROGRAM

## 2023-01-06 PROCEDURE — 3078F DIAST BP <80 MM HG: CPT | Performed by: STUDENT IN AN ORGANIZED HEALTH CARE EDUCATION/TRAINING PROGRAM

## 2023-01-06 PROCEDURE — 3017F COLORECTAL CA SCREEN DOC REV: CPT | Performed by: STUDENT IN AN ORGANIZED HEALTH CARE EDUCATION/TRAINING PROGRAM

## 2023-01-06 PROCEDURE — 1123F ACP DISCUSS/DSCN MKR DOCD: CPT | Performed by: STUDENT IN AN ORGANIZED HEALTH CARE EDUCATION/TRAINING PROGRAM

## 2023-01-06 PROCEDURE — 99214 OFFICE O/P EST MOD 30 MIN: CPT | Performed by: STUDENT IN AN ORGANIZED HEALTH CARE EDUCATION/TRAINING PROGRAM

## 2023-01-06 PROCEDURE — G8427 DOCREV CUR MEDS BY ELIG CLIN: HCPCS | Performed by: STUDENT IN AN ORGANIZED HEALTH CARE EDUCATION/TRAINING PROGRAM

## 2023-01-06 PROCEDURE — 3051F HG A1C>EQUAL 7.0%<8.0%: CPT | Performed by: STUDENT IN AN ORGANIZED HEALTH CARE EDUCATION/TRAINING PROGRAM

## 2023-01-06 PROCEDURE — G8417 CALC BMI ABV UP PARAM F/U: HCPCS | Performed by: STUDENT IN AN ORGANIZED HEALTH CARE EDUCATION/TRAINING PROGRAM

## 2023-01-06 RX ORDER — CLOTRIMAZOLE AND BETAMETHASONE DIPROPIONATE 10; .64 MG/G; MG/G
CREAM TOPICAL
Qty: 45 G | Refills: 2 | Status: SHIPPED | OUTPATIENT
Start: 2023-01-06

## 2023-01-06 RX ORDER — METFORMIN HYDROCHLORIDE 500 MG/1
2000 TABLET, EXTENDED RELEASE ORAL
Qty: 360 TABLET | Refills: 1 | Status: SHIPPED | OUTPATIENT
Start: 2023-01-06

## 2023-01-06 RX ORDER — CARVEDILOL 25 MG/1
25 TABLET ORAL 2 TIMES DAILY
Qty: 180 TABLET | Refills: 1 | Status: SHIPPED | OUTPATIENT
Start: 2023-01-06 | End: 2023-04-06

## 2023-01-06 ASSESSMENT — PATIENT HEALTH QUESTIONNAIRE - PHQ9
SUM OF ALL RESPONSES TO PHQ QUESTIONS 1-9: 0
SUM OF ALL RESPONSES TO PHQ QUESTIONS 1-9: 0
SUM OF ALL RESPONSES TO PHQ9 QUESTIONS 1 & 2: 0
SUM OF ALL RESPONSES TO PHQ QUESTIONS 1-9: 0
SUM OF ALL RESPONSES TO PHQ QUESTIONS 1-9: 0
1. LITTLE INTEREST OR PLEASURE IN DOING THINGS: 0
2. FEELING DOWN, DEPRESSED OR HOPELESS: 0

## 2023-01-06 NOTE — PROGRESS NOTES
1/24/2023    Aston Spivey    Chief Complaint   Patient presents with    6 Month Follow-Up     Pt reports no concerns       HPI  History was obtained from pateint. Debbie Landin is a 75 y.o. male with a PMHx as listed below who presents today for 6 month follow up on chronic condtiions. No acute complaints. Wife recent diagnosed with cancer, cervical on chemo [place] ,   Labs reviewed a1c is trending up      1. Type 2 diabetes mellitus without complication, without long-term current use of insulin ([address]    2. S/P AVR (aortic valve replacement)    3. Atrial fibrillation by electrocardiogram ([address]    4. Coronary artery disease involving native coronary artery of native heart without angina pectoris    5. Benign prostatic hyperplasia with weak urinary stream    6. Flexural eczema    7. Candida infection of flexural skin             REVIEW OF SYMPTOMS    Review of Systems   Constitutional:  Negative for chills and fatigue. HENT:  Negative for congestion and sore throat. Respiratory:  Negative for shortness of breath and wheezing. Cardiovascular:  Negative for chest pain and palpitations. Gastrointestinal:  Negative for abdominal pain and nausea. Genitourinary:  Negative for frequency and urgency. Neurological:  Negative for light-headedness. PAST MEDICAL HISTORY  Past Medical History:   Diagnosis Date    A-fib [facility])     Arthritis     Knees, Feet    Atrial fibrillation (HCC)     Back pain     Lower Back    CAD (coronary artery disease)     Diabetes mellitus ([address]     H/O echocardiogram 12/14/2017    EF 50-55%. severe aortic stenosis is present, mod AR, mild MR and TR    H/O echocardiogram 10/07/2021    EF 55-60%. Mild mitral and tricuspid regurgitation is present.     History of benign prostatic hyperplasia     History of cardiac cath 12/14/2017    Severe AS, PDA MID 80% disease, RCA 50% distal stenosis, AVR + CABG VS TAVR and PCI, CTS CONS    History of echocardiogram 02/25/2019    EF 55-60%, Mildly dilated left atrium, Bioprosthetic aortic valve noted with increased velocities indicating stenosis, Mild paravalvular leak, Mitral annular calcification noted without evidence of stenosis    History of exercise stress test 2018    treadmill    History of Holter monitoring 02/15/2018    48 hour holter monitor  One short run of afib. History of transesophageal echocardiography (ADIEL) 10/19/2021    S/p aortic valve replacement (23mm Medtronic Mosaic valve inserted 12/15/2017). No evidence of stenosis (mean P mmHg). Mild perivalvular leak. There was no thrombus noted in the left atrial appendage. Negative bubble study. No PFO or ASD noted. Hx of colonic polyp     Hx of Doppler echocardiogram 2018    EF50-55%,possible stenosis bioprosthetic valve possible perivalval leak,    Hyperlipidemia     Hypertension     Murmur, cardiac     Obesity     S/P AVR 12/15/2017    with cabg x1 svg-pda       FAMILY HISTORY  Family History   Problem Relation Age of Onset    Stroke Mother     Arthritis Mother     Early Death Father         Suicide in his 42's    Other Sister         Parkinson    High Blood Pressure Sister        SOCIAL HISTORY  Social History     Socioeconomic History    Marital status:    Tobacco Use    Smoking status: Former     Packs/day: 1.00     Years: 24.00     Pack years: 24.00     Types: Cigarettes     Start date:      Quit date:      Years since quittin.0    Smokeless tobacco: Never   Vaping Use    Vaping Use: Never used   Substance and Sexual Activity    Alcohol use: Yes     Alcohol/week: 2.0 standard drinks     Types: 2 Cans of beer per week     Comment: 2-4 cans almost daily.     Drug use: No    Sexual activity: Never        SURGICAL HISTORY  Past Surgical History:   Procedure Laterality Date    AORTIC VALVE REPLACEMENT  12/15/2017    with cabg x1 svg-pda    APPENDECTOMY  2013    COLONOSCOPY  16    Diverticulosis, polyp x1    HERNIA REPAIR      with Mesh HERNIA REPAIR  2018    JOINT REPLACEMENT Left 2014    TKA    KNEE CARTILAGE SURGERY Left     URACHAL CYST INCISION      Partial Bladder removal and Appendectomy    VENTRAL HERNIA REPAIR  4/7/16    laprascopic                 CURRENT MEDICATIONS  Current Outpatient Medications   Medication Sig Dispense Refill    metFORMIN (GLUCOPHAGE-XR) 500 MG extended release tablet Take 4 tablets by mouth daily (with breakfast) 360 tablet 1    carvedilol (COREG) 25 MG tablet Take 1 tablet by mouth 2 times daily 180 tablet 1    hydrocortisone 2.5 % cream Apply topically 2 times daily. 1 each 1    clotrimazole-betamethasone (LOTRISONE) 1-0.05 % cream Apply topically 2 times daily. 45 g 2    aspirin 81 MG EC tablet TAKE 1 TABLET BY MOUTH EVERY DAY 90 tablet 1    atorvastatin (LIPITOR) 10 MG tablet TAKE 1 TABLET BY MOUTH EVERY DAY 90 tablet 1    ELIQUIS 5 MG TABS tablet TAKE 1 TABLET BY MOUTH TWICE A  tablet 1    MILK THISTLE PO Take by mouth      Melatonin 5 MG CAPS Take 1 tablet by mouth nightly as needed       Multiple Vitamins-Minerals (PX SENIOR VITAMIN PO) Take 1 tablet by mouth daily       No current facility-administered medications for this visit. ALLERGIES  No Known Allergies    PHYSICAL EXAM    /84   Pulse 81   Ht 5' 10\" (1.778 m)   Wt 256 lb (116.1 kg)   SpO2 98%   BMI 36.73 kg/m²     Physical Exam  Constitutional:       Appearance: Normal appearance. HENT:      Head: Normocephalic and atraumatic. Eyes:      Extraocular Movements: Extraocular movements intact. Pupils: Pupils are equal, round, and reactive to light. Cardiovascular:      Rate and Rhythm: Normal rate and regular rhythm. Pulses: Normal pulses. Heart sounds: No murmur heard. No friction rub. No gallop. Skin:     General: Skin is warm and dry. Neurological:      General: No focal deficit present. Mental Status: He is alert.    Psychiatric:         Mood and Affect: Mood normal.         Behavior: Behavior normal.       Candida gluteals    ASSESSMENT & PLAN    1. Type 2 diabetes mellitus without complication, without long-term current use of insulin (HCC)    - metFORMIN (GLUCOPHAGE-XR) 500 MG extended release tablet; Take 4 tablets by mouth daily (with breakfast)  Dispense: 360 tablet; Refill: 1  - Hemoglobin A1C; Future  - Basic Metabolic Panel; Future    2. S/P AVR (aortic valve replacement)    - carvedilol (COREG) 25 MG tablet; Take 1 tablet by mouth 2 times daily  Dispense: 180 tablet; Refill: 1    3. Atrial fibrillation by electrocardiogram ([address]      4. Coronary artery disease involving native coronary artery of native heart without angina pectoris      5. Benign prostatic hyperplasia with weak urinary stream      6. Flexural eczema    - hydrocortisone 2.5 % cream; Apply topically 2 times daily. Dispense: 1 each; Refill: 1    7. Candida infection of flexural skin  - hydrocortisone 2.5 % cream; Apply topically 2 times daily. Dispense: 1 each; Refill: 1  - clotrimazole-betamethasone (LOTRISONE) 1-0.05 % cream; Apply topically 2 times daily. Dispense: 45 g; Refill: 2    BPH stable  A1c trending up we will repeat labs in 4 months trial of diet/lifestyle changes continue current regimn  Rate stable continue to follow with cardiology  Candida start lotrisone    Return in about 6 months (around 7/6/2023).          Electronically signed by Carmel Spivey DO on 1/24/2023

## 2023-01-06 NOTE — RESULT ENCOUNTER NOTE
Pj Ring, your labs are fairly normal we will discuss further at appt.  Diabetes numbers slightly up otherwise elvia

## 2023-01-11 ENCOUNTER — TELEPHONE (OUTPATIENT)
Dept: CARDIOLOGY CLINIC | Age: 68
End: 2023-01-11

## 2023-01-11 NOTE — TELEPHONE ENCOUNTER
We got a call from YAKOV [facility] with [facility] for DOS 11/23/2022, he saw Melvin Malave and [facility] denied the claim stating Melvin Malave is not In-Network. I emailed [employer] with the Credentialing Dept to review this. Also, I emailed [email or web address]  To move the charge back to the insurance bucket.

## 2023-01-24 ASSESSMENT — ENCOUNTER SYMPTOMS
NAUSEA: 0
SORE THROAT: 0
WHEEZING: 0
ABDOMINAL PAIN: 0
SHORTNESS OF BREATH: 0

## 2023-04-11 DIAGNOSIS — E11.9 TYPE 2 DIABETES MELLITUS WITHOUT COMPLICATION, WITHOUT LONG-TERM CURRENT USE OF INSULIN (HCC): ICD-10-CM

## 2023-04-12 LAB
ANION GAP SERPL CALCULATED.3IONS-SCNC: 13 MMOL/L (ref 3–16)
BUN SERPL-MCNC: 18 MG/DL (ref 7–20)
CALCIUM SERPL-MCNC: 9.6 MG/DL (ref 8.3–10.6)
CHLORIDE SERPL-SCNC: 102 MMOL/L (ref 99–110)
CO2 SERPL-SCNC: 22 MMOL/L (ref 21–32)
CREAT SERPL-MCNC: 1.1 MG/DL (ref 0.8–1.3)
EST. AVERAGE GLUCOSE BLD GHB EST-MCNC: 191.5 MG/DL
GFR SERPLBLD CREATININE-BSD FMLA CKD-EPI: >60 ML/MIN/{1.73_M2}
GLUCOSE SERPL-MCNC: 176 MG/DL (ref 70–99)
HBA1C MFR BLD: 8.3 %
POTASSIUM SERPL-SCNC: 4.6 MMOL/L (ref 3.5–5.1)
SODIUM SERPL-SCNC: 137 MMOL/L (ref 136–145)

## 2023-05-05 ENCOUNTER — TELEPHONE (OUTPATIENT)
Dept: FAMILY MEDICINE CLINIC | Age: 68
End: 2023-05-05

## 2023-05-10 DIAGNOSIS — B35.1 ONYCHOMYCOSIS: ICD-10-CM

## 2023-05-10 LAB
ALBUMIN SERPL-MCNC: 4.5 G/DL (ref 3.4–5)
ALBUMIN/GLOB SERPL: 1.7 {RATIO} (ref 1.1–2.2)
ALP SERPL-CCNC: 64 U/L (ref 40–129)
ALT SERPL-CCNC: 24 U/L (ref 10–40)
ANION GAP SERPL CALCULATED.3IONS-SCNC: 14 MMOL/L (ref 3–16)
AST SERPL-CCNC: 20 U/L (ref 15–37)
BILIRUB SERPL-MCNC: 0.7 MG/DL (ref 0–1)
BUN SERPL-MCNC: 19 MG/DL (ref 7–20)
CALCIUM SERPL-MCNC: 9.2 MG/DL (ref 8.3–10.6)
CHLORIDE SERPL-SCNC: 104 MMOL/L (ref 99–110)
CO2 SERPL-SCNC: 20 MMOL/L (ref 21–32)
CREAT SERPL-MCNC: 1 MG/DL (ref 0.8–1.3)
GFR SERPLBLD CREATININE-BSD FMLA CKD-EPI: >60 ML/MIN/{1.73_M2}
GLUCOSE SERPL-MCNC: 186 MG/DL (ref 70–99)
POTASSIUM SERPL-SCNC: 4.9 MMOL/L (ref 3.5–5.1)
PROT SERPL-MCNC: 7.1 G/DL (ref 6.4–8.2)
SODIUM SERPL-SCNC: 138 MMOL/L (ref 136–145)

## 2023-05-11 ENCOUNTER — TELEPHONE (OUTPATIENT)
Dept: FAMILY MEDICINE CLINIC | Age: 68
End: 2023-05-11

## 2023-05-11 NOTE — TELEPHONE ENCOUNTER
Called patient to schedule an AWV. Patient states that he just did a wellness appt with Xavi.   Maybe next year for AWV Repair Anesthesia Method: local infiltration

## 2023-05-13 ENCOUNTER — OFFICE VISIT (OUTPATIENT)
Dept: CARDIOLOGY CLINIC | Age: 68
End: 2023-05-13
Payer: MEDICARE

## 2023-05-13 VITALS
HEART RATE: 100 BPM | DIASTOLIC BLOOD PRESSURE: 70 MMHG | WEIGHT: 256.8 LBS | HEIGHT: 70 IN | BODY MASS INDEX: 36.76 KG/M2 | SYSTOLIC BLOOD PRESSURE: 118 MMHG

## 2023-05-13 DIAGNOSIS — I48.91 ATRIAL FIBRILLATION BY ELECTROCARDIOGRAM (HCC): Primary | ICD-10-CM

## 2023-05-13 PROCEDURE — G8417 CALC BMI ABV UP PARAM F/U: HCPCS | Performed by: INTERNAL MEDICINE

## 2023-05-13 PROCEDURE — 3074F SYST BP LT 130 MM HG: CPT | Performed by: INTERNAL MEDICINE

## 2023-05-13 PROCEDURE — G8427 DOCREV CUR MEDS BY ELIG CLIN: HCPCS | Performed by: INTERNAL MEDICINE

## 2023-05-13 PROCEDURE — 1123F ACP DISCUSS/DSCN MKR DOCD: CPT | Performed by: INTERNAL MEDICINE

## 2023-05-13 PROCEDURE — 3017F COLORECTAL CA SCREEN DOC REV: CPT | Performed by: INTERNAL MEDICINE

## 2023-05-13 PROCEDURE — 93000 ELECTROCARDIOGRAM COMPLETE: CPT | Performed by: INTERNAL MEDICINE

## 2023-05-13 PROCEDURE — 3078F DIAST BP <80 MM HG: CPT | Performed by: INTERNAL MEDICINE

## 2023-05-13 PROCEDURE — 1036F TOBACCO NON-USER: CPT | Performed by: INTERNAL MEDICINE

## 2023-05-13 PROCEDURE — 99214 OFFICE O/P EST MOD 30 MIN: CPT | Performed by: INTERNAL MEDICINE

## 2023-05-13 NOTE — PROGRESS NOTES
Phoenix Antoine  is a  Established patient  ,75 y.o.   male here for evaluation of the following chief complaint(s): A Flutter    Post DCCV        SUBJECTIVE/OBJECTIVE:  HPI : h/o  A flutter, cad, htn, hyperlipidimea now here  Has no cardiac compalins  Feel better since dccv  Review of Systems    neg    Vitals:    05/13/23 0832   BP: 118/70   Site: Left Upper Arm   Position: Sitting   Cuff Size: Medium Adult   Pulse: 100   Weight: 256 lb 12.8 oz (116.5 kg)   Height: 5' 10\" (1.778 m)       /70 (Site: Left Upper Arm, Position: Sitting, Cuff Size: Medium Adult)   Pulse 100   Ht 5' 10\" (1.778 m)   Wt 256 lb 12.8 oz (116.5 kg)   BMI 36.85 kg/m²   Patient-Reported Vitals 6/9/2020   Patient-Reported Weight 247lb   Patient-Reported Height 71\"   Patient-Reported Systolic 591   Patient-Reported Diastolic 80   Patient-Reported Pulse 64     Wt Readings from Last 3 Encounters:   05/13/23 256 lb 12.8 oz (116.5 kg)   04/11/23 253 lb 14.4 oz (115.2 kg)   01/06/23 256 lb (116.1 kg)     Body mass index is 36.85 kg/m². Physical Exam     Neck: JVD      Lungs : clear    Cardio : Si and S2 audilble      Ext: edema      All pertinent data reviewed      Meds : reviewed         Tests ordered        ASSESSMENT/PLAN:         - Atrial fibrillation, pt is  compliant with meds. Patient does not have symptoms from atrial fibrillation  On Eliquis we will continue  Refer to EP  Back in  afib  Patient has been under a lot of stress lately wife passed away in February and he has been having alcohol as well we will proceed with ADIEL cardioversion discussed with patient in detail      - VHD  SP  AVR  Bioprosthetic 23 mm Medtronic Mosaic valve placed in December 2017  DC caggeine ad ETOH      -     CORONARY ARTERY DISEASE:  asymptomatic     All available  tests in chart reviewed. Management discussed .   Testing ordered  no              Aspirin stable we will continue      Had SVG to PDA in 2017 along with AVR stable has no issues               -

## 2023-05-15 ENCOUNTER — TELEPHONE (OUTPATIENT)
Dept: CARDIOLOGY CLINIC | Age: 68
End: 2023-05-15

## 2023-05-15 DIAGNOSIS — Z01.810 PRE-OPERATIVE CARDIOVASCULAR EXAMINATION: Primary | ICD-10-CM

## 2023-05-15 NOTE — TELEPHONE ENCOUNTER
[facility] Avers     Transesophageal Echocardiogram with Cardioversion    Patient Name: Duana Mcburney  CIU:[id number] IEO#[id number]     Date of Procedure: 6/8/23 Time: 10am Arrival Time: 9am   (Arrival time is scheduled for one (1) hour before procedure is scheduled.)     Hospital: [facility])     X   If you have received orders for blood work and or a chest x-ray, please have them done on assigned date at [facility], [facility], or [facility]. X   Please do not have anything by mouth after midnight prior to or 8 hours before the procedure. X   You may take your medication with a sip of water unless advised otherwise below. X Please continue to take Eliquis (apixaban) as directed.

## 2023-05-15 NOTE — TELEPHONE ENCOUNTER
Pt notified and confirmed procedure for 6/8/23 @10am, arrival time of 9am. Instruction call scheduled for 6/5/23 @10am.

## 2023-05-24 LAB — DIABETIC RETINOPATHY: NEGATIVE

## 2023-06-08 ENCOUNTER — HOSPITAL ENCOUNTER (OUTPATIENT)
Dept: NON INVASIVE DIAGNOSTICS | Age: 68
Discharge: HOME | End: 2023-06-08
Payer: MEDICARE

## 2023-06-08 VITALS
DIASTOLIC BLOOD PRESSURE: 73 MMHG | OXYGEN SATURATION: 97 % | SYSTOLIC BLOOD PRESSURE: 98 MMHG | RESPIRATION RATE: 25 BRPM | HEART RATE: 83 BPM

## 2023-06-08 PROBLEM — I48.0 PAROXYSMAL ATRIAL FIBRILLATION (HCC): Status: ACTIVE | Noted: 2023-06-08

## 2023-06-08 PROCEDURE — 7100000000 HC PACU RECOVERY - FIRST 15 MIN

## 2023-06-08 PROCEDURE — 7100000001 HC PACU RECOVERY - ADDTL 15 MIN

## 2023-06-08 PROCEDURE — 93312 ECHO TRANSESOPHAGEAL: CPT

## 2023-06-08 PROCEDURE — 93312 ECHO TRANSESOPHAGEAL: CPT | Performed by: INTERNAL MEDICINE

## 2023-06-08 PROCEDURE — 93005 ELECTROCARDIOGRAM TRACING: CPT | Performed by: INTERNAL MEDICINE

## 2023-06-08 PROCEDURE — 92960 CARDIOVERSION ELECTRIC EXT: CPT | Performed by: INTERNAL MEDICINE

## 2023-06-08 PROCEDURE — 6370000000 HC RX 637 (ALT 250 FOR IP): Performed by: INTERNAL MEDICINE

## 2023-06-08 PROCEDURE — 92960 CARDIOVERSION ELECTRIC EXT: CPT

## 2023-06-08 RX ORDER — LIDOCAINE HYDROCHLORIDE 20 MG/ML
15 SOLUTION OROPHARYNGEAL
Status: DISCONTINUED | OUTPATIENT
Start: 2023-06-08 | End: 2023-06-09 | Stop reason: HOSPADM

## 2023-06-08 RX ADMIN — LIDOCAINE HYDROCHLORIDE 15 ML: 20 SOLUTION ORAL; TOPICAL at 10:24

## 2023-06-08 NOTE — OP NOTE
Operative Note      Patient: Tammi Llanes  YOB: 1955  MRN: [id number]                                                                                           [facility] ANKUR ESPINAL MD  FAC                                                                                         Dipl CBNC, CBCCT, NBE, RPVI                                                                                                    CARDIOVESION      Indication: Atrial fibrillation      After obtaining the informed consent pt was sedated  With Versed    . A     200     J synchronised  shock was given. Pt converted to   sinus rhythm       Pt remained clinically and hemodynamically stable. On Eliquis        Cont with present medical therapy.       I:  Successful cardioversion    Plan:  Cont present therapy  F/U in office in 2 weeks

## 2023-06-09 LAB
EKG ATRIAL RATE: 234 BPM
EKG ATRIAL RATE: 79 BPM
EKG DIAGNOSIS: NORMAL
EKG DIAGNOSIS: NORMAL
EKG P AXIS: 229 DEGREES
EKG P AXIS: 57 DEGREES
EKG P-R INTERVAL: 202 MS
EKG Q-T INTERVAL: 358 MS
EKG Q-T INTERVAL: 402 MS
EKG QRS DURATION: 98 MS
EKG QRS DURATION: 98 MS
EKG QTC CALCULATION (BAZETT): 454 MS
EKG QTC CALCULATION (BAZETT): 460 MS
EKG R AXIS: 38 DEGREES
EKG R AXIS: 43 DEGREES
EKG T AXIS: 44 DEGREES
EKG T AXIS: 49 DEGREES
EKG VENTRICULAR RATE: 79 BPM
EKG VENTRICULAR RATE: 97 BPM

## 2023-06-09 PROCEDURE — 93010 ELECTROCARDIOGRAM REPORT: CPT | Performed by: INTERNAL MEDICINE

## 2023-06-19 ENCOUNTER — OFFICE VISIT (OUTPATIENT)
Dept: CARDIOLOGY CLINIC | Age: 68
End: 2023-06-19
Payer: MEDICARE

## 2023-06-19 VITALS
WEIGHT: 258 LBS | HEIGHT: 70 IN | HEART RATE: 72 BPM | BODY MASS INDEX: 36.94 KG/M2 | DIASTOLIC BLOOD PRESSURE: 66 MMHG | SYSTOLIC BLOOD PRESSURE: 120 MMHG

## 2023-06-19 DIAGNOSIS — I48.91 ATRIAL FIBRILLATION BY ELECTROCARDIOGRAM (HCC): Primary | ICD-10-CM

## 2023-06-19 PROCEDURE — G8417 CALC BMI ABV UP PARAM F/U: HCPCS | Performed by: INTERNAL MEDICINE

## 2023-06-19 PROCEDURE — 1036F TOBACCO NON-USER: CPT | Performed by: INTERNAL MEDICINE

## 2023-06-19 PROCEDURE — 99214 OFFICE O/P EST MOD 30 MIN: CPT | Performed by: INTERNAL MEDICINE

## 2023-06-19 PROCEDURE — G8427 DOCREV CUR MEDS BY ELIG CLIN: HCPCS | Performed by: INTERNAL MEDICINE

## 2023-06-19 PROCEDURE — 1123F ACP DISCUSS/DSCN MKR DOCD: CPT | Performed by: INTERNAL MEDICINE

## 2023-06-19 PROCEDURE — 3017F COLORECTAL CA SCREEN DOC REV: CPT | Performed by: INTERNAL MEDICINE

## 2023-06-19 PROCEDURE — 93000 ELECTROCARDIOGRAM COMPLETE: CPT | Performed by: INTERNAL MEDICINE

## 2023-06-19 PROCEDURE — 3074F SYST BP LT 130 MM HG: CPT | Performed by: INTERNAL MEDICINE

## 2023-06-19 PROCEDURE — 3078F DIAST BP <80 MM HG: CPT | Performed by: INTERNAL MEDICINE

## 2023-06-19 NOTE — PROGRESS NOTES
Michael Castro  is a  Established patient  ,70 y.o.   male here for evaluation of the following chief complaint(s): A Flutter            SUBJECTIVE/OBJECTIVE:  HPI : h/o  A flutter, cad, htn, hyperlipidimea now here  Has no cardiac compalins  Patient is here status post ADIEL cardioversion      Vitals:    06/19/23 1648   BP: 120/66   Pulse: 72   Weight: 258 lb (117 kg)   Height: 5' 10\" (1.778 m)         /66   Pulse 72   Ht 5' 10\" (1.778 m)   Wt 258 lb (117 kg)   BMI 37.02 kg/m²   Patient-Reported Vitals 6/9/2020   Patient-Reported Weight 247lb   Patient-Reported Height 71\"   Patient-Reported Systolic 219   Patient-Reported Diastolic 80   Patient-Reported Pulse 64     Wt Readings from Last 3 Encounters:   06/19/23 258 lb (117 kg)   05/13/23 256 lb 12.8 oz (116.5 kg)   04/11/23 253 lb 14.4 oz (115.2 kg)     Body mass index is 37.02 kg/m². Physical Exam     Neck: JVD      Lungs : clear    Cardio : Si and S2 audilble      Ext: edema      All pertinent data reviewed      Meds : reviewed         Tests ordered        ASSESSMENT/PLAN:         - Atrial fibrillation, pt is  compliant with meds. Patient does not have symptoms from atrial fibrillation  On Eliquis we will continue  Underwent ADIEL and cardioversion        - VHD  SP  AVR  Bioprosthetic 23 mm Medtronic Mosaic valve placed in December 2017  DC caggeine ad ETOH      -     CORONARY ARTERY DISEASE:  asymptomatic     All available  tests in chart reviewed. Management discussed . Testing ordered  no              Aspirin stable we will continue      Had SVG to PDA in 2017 along with AVR stable has no issues               -  Hypertension: Patients blood pressure is normal. Patient is advised about low sodium diet. Present medical regimen will not be changed. On Coreg blood pressure is well controlled however he has put it on hold for now    -  LIPID MANAGEMENT:  Importance of lipid levels discussed with patient   and patient was given dietary advice.  NCEP- ATP

## 2023-06-29 RX ORDER — APIXABAN 5 MG/1
TABLET, FILM COATED ORAL
Qty: 180 TABLET | Refills: 1 | Status: SHIPPED | OUTPATIENT
Start: 2023-06-29

## 2023-07-03 RX ORDER — ASPIRIN 81 MG/1
TABLET, COATED ORAL
Qty: 90 TABLET | Refills: 1 | Status: SHIPPED | OUTPATIENT
Start: 2023-07-03

## 2023-07-06 ENCOUNTER — OFFICE VISIT (OUTPATIENT)
Dept: FAMILY MEDICINE CLINIC | Age: 68
End: 2023-07-06
Payer: MEDICARE

## 2023-07-06 VITALS
OXYGEN SATURATION: 96 % | HEART RATE: 75 BPM | HEIGHT: 70 IN | SYSTOLIC BLOOD PRESSURE: 128 MMHG | DIASTOLIC BLOOD PRESSURE: 78 MMHG | WEIGHT: 251.9 LBS | BODY MASS INDEX: 36.06 KG/M2

## 2023-07-06 DIAGNOSIS — I10 PRIMARY HYPERTENSION: ICD-10-CM

## 2023-07-06 DIAGNOSIS — I48.3 TYPICAL ATRIAL FLUTTER (HCC): ICD-10-CM

## 2023-07-06 DIAGNOSIS — E78.2 MIXED HYPERLIPIDEMIA: ICD-10-CM

## 2023-07-06 DIAGNOSIS — E11.9 TYPE 2 DIABETES MELLITUS WITHOUT COMPLICATION, WITHOUT LONG-TERM CURRENT USE OF INSULIN (HCC): Primary | ICD-10-CM

## 2023-07-06 PROCEDURE — 99213 OFFICE O/P EST LOW 20 MIN: CPT | Performed by: STUDENT IN AN ORGANIZED HEALTH CARE EDUCATION/TRAINING PROGRAM

## 2023-07-06 PROCEDURE — 1123F ACP DISCUSS/DSCN MKR DOCD: CPT | Performed by: STUDENT IN AN ORGANIZED HEALTH CARE EDUCATION/TRAINING PROGRAM

## 2023-07-06 PROCEDURE — G8417 CALC BMI ABV UP PARAM F/U: HCPCS | Performed by: STUDENT IN AN ORGANIZED HEALTH CARE EDUCATION/TRAINING PROGRAM

## 2023-07-06 PROCEDURE — 3017F COLORECTAL CA SCREEN DOC REV: CPT | Performed by: STUDENT IN AN ORGANIZED HEALTH CARE EDUCATION/TRAINING PROGRAM

## 2023-07-06 PROCEDURE — 2022F DILAT RTA XM EVC RTNOPTHY: CPT | Performed by: STUDENT IN AN ORGANIZED HEALTH CARE EDUCATION/TRAINING PROGRAM

## 2023-07-06 PROCEDURE — 3052F HG A1C>EQUAL 8.0%<EQUAL 9.0%: CPT | Performed by: STUDENT IN AN ORGANIZED HEALTH CARE EDUCATION/TRAINING PROGRAM

## 2023-07-06 PROCEDURE — G8427 DOCREV CUR MEDS BY ELIG CLIN: HCPCS | Performed by: STUDENT IN AN ORGANIZED HEALTH CARE EDUCATION/TRAINING PROGRAM

## 2023-07-06 PROCEDURE — 3074F SYST BP LT 130 MM HG: CPT | Performed by: STUDENT IN AN ORGANIZED HEALTH CARE EDUCATION/TRAINING PROGRAM

## 2023-07-06 PROCEDURE — 1036F TOBACCO NON-USER: CPT | Performed by: STUDENT IN AN ORGANIZED HEALTH CARE EDUCATION/TRAINING PROGRAM

## 2023-07-06 PROCEDURE — 3078F DIAST BP <80 MM HG: CPT | Performed by: STUDENT IN AN ORGANIZED HEALTH CARE EDUCATION/TRAINING PROGRAM

## 2023-07-10 ASSESSMENT — ENCOUNTER SYMPTOMS
SHORTNESS OF BREATH: 0
NAUSEA: 0
ABDOMINAL PAIN: 0
WHEEZING: 0
SORE THROAT: 0

## 2023-07-19 DIAGNOSIS — E11.9 TYPE 2 DIABETES MELLITUS WITHOUT COMPLICATION, WITHOUT LONG-TERM CURRENT USE OF INSULIN (HCC): ICD-10-CM

## 2023-07-20 RX ORDER — METFORMIN HYDROCHLORIDE 500 MG/1
2000 TABLET, EXTENDED RELEASE ORAL
Qty: 360 TABLET | Refills: 1 | Status: SHIPPED | OUTPATIENT
Start: 2023-07-20

## 2023-08-17 ENCOUNTER — TELEPHONE (OUTPATIENT)
Dept: CARDIOLOGY CLINIC | Age: 68
End: 2023-08-17

## 2023-08-22 ENCOUNTER — TELEPHONE (OUTPATIENT)
Dept: CARDIOLOGY CLINIC | Age: 68
End: 2023-08-22

## 2023-08-22 NOTE — TELEPHONE ENCOUNTER
a mean gradient of about 3 mmHg, with a MVA of   2.18 cm^2. Moderate mitral and mild-to-moderate tricuspid regurgitation is present. Mitral annular calcification is present. Mildly elevated pulmonary artery pressure, RVSP 35 mmHg. No evidence of pericardial effusion.       Cath- 12/14/2017   SEVERE AS    PDA MID 80% DISEASE RCA 50% DISTAL STENOSIS         CABG_ 12/15/2017  With Aorta Valve replacement      Eliquis    Aspirin

## 2023-08-23 DIAGNOSIS — Z95.2 S/P AVR (AORTIC VALVE REPLACEMENT): ICD-10-CM

## 2023-08-24 RX ORDER — CARVEDILOL 25 MG/1
TABLET ORAL
Qty: 180 TABLET | Refills: 1 | Status: SHIPPED | OUTPATIENT
Start: 2023-08-24

## 2023-08-24 RX ORDER — ATORVASTATIN CALCIUM 10 MG/1
TABLET, FILM COATED ORAL
Qty: 90 TABLET | Refills: 1 | Status: SHIPPED | OUTPATIENT
Start: 2023-08-24

## 2023-11-03 DIAGNOSIS — E11.9 TYPE 2 DIABETES MELLITUS WITHOUT COMPLICATION, WITHOUT LONG-TERM CURRENT USE OF INSULIN (HCC): ICD-10-CM

## 2023-11-04 LAB
ALBUMIN SERPL-MCNC: 4.3 G/DL (ref 3.4–5)
ALP SERPL-CCNC: 63 U/L (ref 40–129)
ALT SERPL-CCNC: 34 U/L (ref 10–40)
ANION GAP SERPL CALCULATED.3IONS-SCNC: 13 MMOL/L (ref 3–16)
AST SERPL-CCNC: 25 U/L (ref 15–37)
BILIRUB DIRECT SERPL-MCNC: <0.2 MG/DL (ref 0–0.3)
BILIRUB INDIRECT SERPL-MCNC: NORMAL MG/DL (ref 0–1)
BILIRUB SERPL-MCNC: 0.5 MG/DL (ref 0–1)
BUN SERPL-MCNC: 24 MG/DL (ref 7–20)
CALCIUM SERPL-MCNC: 9.5 MG/DL (ref 8.3–10.6)
CHLORIDE SERPL-SCNC: 99 MMOL/L (ref 99–110)
CO2 SERPL-SCNC: 23 MMOL/L (ref 21–32)
CREAT SERPL-MCNC: 0.9 MG/DL (ref 0.8–1.3)
CREAT UR-MCNC: 48.3 MG/DL (ref 39–259)
EST. AVERAGE GLUCOSE BLD GHB EST-MCNC: 191.5 MG/DL
GFR SERPLBLD CREATININE-BSD FMLA CKD-EPI: >60 ML/MIN/{1.73_M2}
GLUCOSE SERPL-MCNC: 156 MG/DL (ref 70–99)
HBA1C MFR BLD: 8.3 %
MICROALBUMIN UR DL<=1MG/L-MCNC: <1.2 MG/DL
MICROALBUMIN/CREAT UR: NORMAL MG/G (ref 0–30)
POTASSIUM SERPL-SCNC: 4.4 MMOL/L (ref 3.5–5.1)
PROT SERPL-MCNC: 7 G/DL (ref 6.4–8.2)
SODIUM SERPL-SCNC: 135 MMOL/L (ref 136–145)

## 2023-11-06 ENCOUNTER — OFFICE VISIT (OUTPATIENT)
Dept: FAMILY MEDICINE CLINIC | Age: 68
End: 2023-11-06
Payer: MEDICARE

## 2023-11-06 VITALS
HEIGHT: 70 IN | SYSTOLIC BLOOD PRESSURE: 128 MMHG | WEIGHT: 248 LBS | BODY MASS INDEX: 35.5 KG/M2 | HEART RATE: 112 BPM | OXYGEN SATURATION: 98 % | DIASTOLIC BLOOD PRESSURE: 80 MMHG

## 2023-11-06 DIAGNOSIS — E11.9 TYPE 2 DIABETES MELLITUS WITHOUT COMPLICATION, WITHOUT LONG-TERM CURRENT USE OF INSULIN (HCC): Primary | ICD-10-CM

## 2023-11-06 DIAGNOSIS — E78.2 MIXED HYPERLIPIDEMIA: ICD-10-CM

## 2023-11-06 DIAGNOSIS — I48.91 ATRIAL FIBRILLATION BY ELECTROCARDIOGRAM (HCC): ICD-10-CM

## 2023-11-06 PROCEDURE — 1036F TOBACCO NON-USER: CPT | Performed by: STUDENT IN AN ORGANIZED HEALTH CARE EDUCATION/TRAINING PROGRAM

## 2023-11-06 PROCEDURE — 3017F COLORECTAL CA SCREEN DOC REV: CPT | Performed by: STUDENT IN AN ORGANIZED HEALTH CARE EDUCATION/TRAINING PROGRAM

## 2023-11-06 PROCEDURE — G8427 DOCREV CUR MEDS BY ELIG CLIN: HCPCS | Performed by: STUDENT IN AN ORGANIZED HEALTH CARE EDUCATION/TRAINING PROGRAM

## 2023-11-06 PROCEDURE — 99214 OFFICE O/P EST MOD 30 MIN: CPT | Performed by: STUDENT IN AN ORGANIZED HEALTH CARE EDUCATION/TRAINING PROGRAM

## 2023-11-06 PROCEDURE — 2022F DILAT RTA XM EVC RTNOPTHY: CPT | Performed by: STUDENT IN AN ORGANIZED HEALTH CARE EDUCATION/TRAINING PROGRAM

## 2023-11-06 PROCEDURE — 3078F DIAST BP <80 MM HG: CPT | Performed by: STUDENT IN AN ORGANIZED HEALTH CARE EDUCATION/TRAINING PROGRAM

## 2023-11-06 PROCEDURE — 3074F SYST BP LT 130 MM HG: CPT | Performed by: STUDENT IN AN ORGANIZED HEALTH CARE EDUCATION/TRAINING PROGRAM

## 2023-11-06 PROCEDURE — G8484 FLU IMMUNIZE NO ADMIN: HCPCS | Performed by: STUDENT IN AN ORGANIZED HEALTH CARE EDUCATION/TRAINING PROGRAM

## 2023-11-06 PROCEDURE — G8417 CALC BMI ABV UP PARAM F/U: HCPCS | Performed by: STUDENT IN AN ORGANIZED HEALTH CARE EDUCATION/TRAINING PROGRAM

## 2023-11-06 PROCEDURE — 3052F HG A1C>EQUAL 8.0%<EQUAL 9.0%: CPT | Performed by: STUDENT IN AN ORGANIZED HEALTH CARE EDUCATION/TRAINING PROGRAM

## 2023-11-06 PROCEDURE — 1123F ACP DISCUSS/DSCN MKR DOCD: CPT | Performed by: STUDENT IN AN ORGANIZED HEALTH CARE EDUCATION/TRAINING PROGRAM

## 2023-11-06 NOTE — PROGRESS NOTES
Pupils: Pupils are equal, round, and reactive to light. Cardiovascular:      Rate and Rhythm: Normal rate and regular rhythm. Pulses: Normal pulses. Heart sounds: No murmur heard. No friction rub. No gallop. Pulmonary:      Effort: Pulmonary effort is normal.      Breath sounds: Normal breath sounds. Skin:     General: Skin is warm and dry. Neurological:      General: No focal deficit present. Mental Status: He is alert. Psychiatric:         Mood and Affect: Mood normal.         Behavior: Behavior normal.       Irreg, irreg rate stable  ASSESSMENT & PLAN    1. Type 2 diabetes mellitus without complication, without long-term current use of insulin (HCC)    - Dulaglutide 0.75 MG/0.5ML SOPN; Inject 0.75 mg into the skin once a week  Dispense: 4 Adjustable Dose Pre-filled Pen Syringe; Refill: 2  - Hemoglobin A1C; Future  - Basic Metabolic Panel; Future    2. Mixed hyperlipidemia    - Lipid, Fasting; Future    3. Atrial fibrillation by electrocardiogram [facility])      Start trulicity goal to get M3I <7 following with Dr. Ari Lundberg to follow cardio, HR mild elevation today complaint eliquis  Return in about 3 months (around 2/6/2024).          Electronically signed by Oscar Palm DO on 11/14/2023

## 2023-11-08 ENCOUNTER — TELEPHONE (OUTPATIENT)
Dept: CARDIOLOGY CLINIC | Age: 68
End: 2023-11-08

## 2023-11-14 ASSESSMENT — ENCOUNTER SYMPTOMS
SORE THROAT: 0
NAUSEA: 0
SHORTNESS OF BREATH: 0
ABDOMINAL PAIN: 0
WHEEZING: 0

## 2023-12-22 ENCOUNTER — INITIAL CONSULT (OUTPATIENT)
Dept: CARDIOLOGY CLINIC | Age: 68
End: 2023-12-22

## 2023-12-22 VITALS
WEIGHT: 250 LBS | DIASTOLIC BLOOD PRESSURE: 70 MMHG | OXYGEN SATURATION: 97 % | BODY MASS INDEX: 35.79 KG/M2 | RESPIRATION RATE: 16 BRPM | HEART RATE: 121 BPM | HEIGHT: 70 IN | SYSTOLIC BLOOD PRESSURE: 122 MMHG

## 2023-12-22 DIAGNOSIS — I48.3 TYPICAL ATRIAL FLUTTER (HCC): Primary | ICD-10-CM

## 2023-12-22 NOTE — PROGRESS NOTES
Electrophysiology ReConsult Note      Reason for consultation: Atrial  flutter    Chief complaint :tiredness, shortness of breath    Referring physician:  Melanie Nolan      Primary care physician: Ann Murcia DO      History of Present Illness: This visit (12/22/2023)    Patient  is a 69-year-old male with past medical history as mentioned below. Patient was referred by Dr. Pearl Gonsalves as a reconsult for atrial flutter. Patient was seen by me 5 years ago in the setting of postop A-fib from CABG. Patient reports until recently had no issues but he has been having tiredness and shortness of breath with exertion recently and he was told that he was in irregular rhythm. He was told he was in atrial flutter. Patient has little symptoms. Patient denies chest pain, dizziness or syncope. Patient wants to consider procedure which was referred to him. Patient is taking his medications as prescribed. Previous visit: 3/28/2018      Chief Complaint   Patient presents with    Atrial Fibrillation     Pt here today per Dr. Oneil Saini request, for A-fib w/RVR episode on holter monitor. Pt denies chest pain, palpitations, shortness of breath, dizziness, and edema. Patient had valve replacement. Patient denies palpitations. Tiredness and weakness which he feels post surgery,           Past medical history:   Past Medical History:   Diagnosis Date    A-fib ([address])     Arthritis     Knees, Feet    Atrial fibrillation (HCC)     Back pain     Lower Back    CAD (coronary artery disease)     Diabetes mellitus ([address])     H/O echocardiogram 12/14/2017    EF 50-55%. severe aortic stenosis is present, mod AR, mild MR and TR    H/O echocardiogram 10/07/2021    EF 55-60%. Mild mitral and tricuspid regurgitation is present. H/O transesophageal echocardiography (ADIEL) for monitoring 06/08/2023    No evidence of thrombus within the left atrial appendage.   Smoke noted in the left atrium, Mild

## 2023-12-27 DIAGNOSIS — I48.92 ATRIAL FLUTTER, UNSPECIFIED TYPE (HCC): Primary | ICD-10-CM

## 2023-12-29 ENCOUNTER — TELEPHONE (OUTPATIENT)
Dept: CARDIOLOGY CLINIC | Age: 68
End: 2023-12-29

## 2023-12-29 NOTE — TELEPHONE ENCOUNTER
Patient called with some questions regarding upcoming procedure.  He has questions about blood work, staying the night at the hospital and if he is able to drive himself.  Please call him back at ph# [phone].

## 2023-12-29 NOTE — TELEPHONE ENCOUNTER
Placed call to patient to advise of Cardiac CTA appointment scheduled on 1/11/24, with an arrival time of 8:30 am and an appointment time of 9:00 am.  Patient should be advised to fast after midnight and to bring an insurance card, photo id.      If patient should need to reschedule, please call Central Scheduling at # [phone].

## 2024-01-02 RX ORDER — ASPIRIN 81 MG/1
81 TABLET ORAL DAILY
Qty: 90 TABLET | Refills: 1 | Status: SHIPPED | OUTPATIENT
Start: 2024-01-02

## 2024-01-02 RX ORDER — APIXABAN 5 MG/1
TABLET, FILM COATED ORAL
Qty: 180 TABLET | Refills: 1 | Status: SHIPPED | OUTPATIENT
Start: 2024-01-02

## 2024-01-02 NOTE — TELEPHONE ENCOUNTER
Spoke with patient he was asking if he stays the night which he will. Also, if he can drive himself home advised him that he could drive himself if not other transportation. He states he has no one that can help so he will be driving himself.

## 2024-01-05 ENCOUNTER — TELEPHONE (OUTPATIENT)
Dept: CARDIOLOGY CLINIC | Age: 69
End: 2024-01-05

## 2024-01-05 NOTE — TELEPHONE ENCOUNTER
[facility] PA Dept called for more clinical   Information for requested CTA  [phone] ext [phone]

## 2024-01-11 ENCOUNTER — HOSPITAL ENCOUNTER (OUTPATIENT)
Dept: CT IMAGING | Age: 69
Discharge: HOME | End: 2024-01-11
Attending: INTERNAL MEDICINE
Payer: MEDICARE

## 2024-01-11 DIAGNOSIS — I48.92 ATRIAL FLUTTER, UNSPECIFIED TYPE (HCC): ICD-10-CM

## 2024-01-11 LAB
EGFR, POC: >60 ML/MIN/1.73M2
POC CREATININE: 0.7 MG/DL (ref 0.5–1.2)

## 2024-01-11 PROCEDURE — 6360000004 HC RX CONTRAST MEDICATION: Performed by: INTERNAL MEDICINE

## 2024-01-11 PROCEDURE — 82565 ASSAY OF CREATININE: CPT

## 2024-01-11 PROCEDURE — 75574 CT ANGIO HRT W/3D IMAGE: CPT

## 2024-01-11 RX ADMIN — IOPAMIDOL 120 ML: 755 INJECTION, SOLUTION INTRAVENOUS at 09:31

## 2024-01-11 NOTE — PROCEDURES
CARDIAC FINDINGS:  Cardiac CT scanning was performed, CT angiography during contrast administration using a 128 Scanner using a slice thickness of 0.625 mm and Gantry rotation time of 0.28 sec   Scanning was performed using 120 K  using auto adjustment of mA  Adjustment achieved low dose, retrospective gating was used  Contrast Type: isovue 370   Contrast used: 1cc/lb Max 70 cc   Dose length Product use:   Scan length: Excellent    Left Main Coronary artery:  Left main originates normally from the left coronary sinus and gives rise to the left anterior descending artery and left circumflex artery. It is free of any significant atherosclerotic disease.     Left anterior descending artery:  Left anterior descending artery is heavily calcified with diffuse calcification high-grade stenosis possible    Left circumflex artery:  Circumflex artery is heavily calcified high-grade stenosis cannot be ruled out there is concentric calcification    Right coronary artery:  Right coronary artery is heavily calcified with high-grade stenosis possible    Bypass graft  Bypass graft is seen from aorta to right coronary artery which seems to be patent with some calcification in the midsegment      Pericardium:  Pericardium is normal in appearance without any thickening, calcification or pericardial fluid.    Cardiac structures:  S/p aortic valve replacement structure artifact seen   The ascending aorta is measured at 36.4 mm  Right inferior pulmonary artery measures 22.1 mm at the ostium  Left  inferior pulmonary artery measures 17.4 mm at the ostium  Right upper pulmonary vein measures 20.8 mm  Left upper pulmonary vein measures 22.1 mm  Atrial appendage measures 18.8 mm at the ostium and 33.7 mm at depth   .    Interpretation summary:  Heavy calcification with multivessel disease  High-grade stenosis of LAD circumflex and RCA is possibly due to disease heavy calcification tandem lesion  SVG to RCA is patent with mild calcification

## 2024-01-17 ENCOUNTER — OFFICE VISIT (OUTPATIENT)
Dept: CARDIOLOGY CLINIC | Age: 69
End: 2024-01-17

## 2024-01-17 VITALS
DIASTOLIC BLOOD PRESSURE: 86 MMHG | HEIGHT: 70 IN | OXYGEN SATURATION: 96 % | HEART RATE: 88 BPM | WEIGHT: 252.4 LBS | BODY MASS INDEX: 36.13 KG/M2 | SYSTOLIC BLOOD PRESSURE: 160 MMHG

## 2024-01-17 DIAGNOSIS — Z01.810 PRE-OPERATIVE CARDIOVASCULAR EXAMINATION: Primary | ICD-10-CM

## 2024-01-17 DIAGNOSIS — Z95.2 S/P AVR (AORTIC VALVE REPLACEMENT): Primary | ICD-10-CM

## 2024-01-17 NOTE — PATIENT INSTRUCTIONS
**It is YOUR responsibilty to bring medication bottles and/or updated medication list to EACH APPOINTMENT. This will allow us to better serve you and all your healthcare needs**   [facility] Locations - No appointment necessary.  Sites open Monday to Friday. Call your preferred location for test preparation, business   hours and other information you need. [facility] accepts all insurances.  [facility]   [address]   [address] [place] [address]  Phone: [phone]    Thank you for allowing us to care for you today!   We want to ensure we can follow your treatment plan and we strive to give you the best outcomes and experience possible.   If you ever have a life threatening emergency and call 911 - for an ambulance (EMS)   Our providers can only care for you at:   [facility] or [facility].   Even if you have someone take you or you drive yourself we can only care for you in a [facility] facility. Our providers are not setup at the other healthcare locations!   Please be informed that if you contact our office outside of normal business hours the physician on call cannot help with any scheduling or rescheduling issues, procedure instruction questions or any type of medication issue.    We advise you for any urgent/emergency that you go to the nearest emergency room!    PLEASE CALL OUR OFFICE DURING NORMAL BUSINESS HOURS    Monday - Friday   8 am to 5 pm    [place]: [phone]    [place]: [phone]    [place]:  [phone]  We are committed to providing you the best care possible.    If you receive a survey after visiting one of our offices, please take time to share your experience concerning your physician office visit.  These surveys are confidential and no health information about you is shared.    We are eager to improve for you and we are counting on your feedback to help make that happen.

## 2024-01-17 NOTE — PROGRESS NOTES
David  is a  Established patient  ,68 y.o.   male here for evaluation of the following chief complaint(s):    A Flutter    FU on Abn CTA coronary          SUBJECTIVE/OBJECTIVE:  HPI : h/o  A flutter, cad, htn, hyperlipidimea now here  Has no cardiac compalins        Vitals:    01/17/24 1426 01/17/24 1429   BP: (!) 154/92 (!) 160/86   Site: Left Upper Arm Right Upper Arm   Position: Sitting Sitting   Cuff Size: Medium Adult Medium Adult   Pulse: 88    SpO2: 96%    Weight: 114.5 kg (252 lb 6.4 oz)    Height: 1.778 m (5' 10\")            BP (!) 160/86 (Site: Right Upper Arm, Position: Sitting, Cuff Size: Medium Adult)   Pulse 88   Ht 1.778 m (5' 10\")   Wt 114.5 kg (252 lb 6.4 oz)   SpO2 96%   BMI 36.22 kg/m²       6/9/2020     8:44 AM   Patient-Reported Vitals   Patient-Reported Weight 247lb   Patient-Reported Height 71\"   Patient-Reported Systolic 140 mmHg   Patient-Reported Diastolic 80 mmHg   Patient-Reported Pulse 64     Wt Readings from Last 3 Encounters:   01/17/24 114.5 kg (252 lb 6.4 oz)   12/22/23 113.4 kg (250 lb)   12/20/23 113.4 kg (250 lb)     Body mass index is 36.22 kg/m².    Physical Exam     Neck: JVD      Lungs : clear    Cardio : Si and S2 audilble      Ext: edema      All pertinent data reviewed      Meds : reviewed         Tests ordered        ASSESSMENT/PLAN:         - Atrial fibrillation, pt is  compliant with meds. Patient does not have symptoms from atrial fibrillation  On Eliquis we will continue  Underwent ADIEL and cardioversion  Awaiting ablation         - VHD  SP  AVR  Bioprosthetic 23 mm Medtronic Mosaic valve placed in December 2017  DC caggeine ad ETOH      -     CORONARY ARTERY DISEASE:  asymptomatic     All available  tests in chart reviewed. Management discussed .  Testing ordered  no              Aspirin stable we will continue      Had SVG to PDA in 2017 along with AVR stable has no issues       nterpretation summary:  Heavy calcification with multivessel disease  High-grade

## 2024-01-18 ENCOUNTER — TELEPHONE (OUTPATIENT)
Dept: CARDIOLOGY CLINIC | Age: 69
End: 2024-01-18

## 2024-01-18 ENCOUNTER — NURSE ONLY (OUTPATIENT)
Dept: CARDIOLOGY CLINIC | Age: 69
End: 2024-01-18

## 2024-01-18 ENCOUNTER — HOSPITAL ENCOUNTER (OUTPATIENT)
Age: 69
Discharge: HOME | End: 2024-01-18
Payer: MEDICARE

## 2024-01-18 ENCOUNTER — HOSPITAL ENCOUNTER (OUTPATIENT)
Age: 69
Setting detail: SPECIMEN
Discharge: HOME | End: 2024-01-18
Payer: MEDICARE

## 2024-01-18 ENCOUNTER — HOSPITAL ENCOUNTER (OUTPATIENT)
Dept: GENERAL RADIOLOGY | Age: 69
Discharge: HOME | End: 2024-01-18
Payer: MEDICARE

## 2024-01-18 DIAGNOSIS — Z79.01 LONG TERM (CURRENT) USE OF ANTICOAGULANTS: ICD-10-CM

## 2024-01-18 DIAGNOSIS — I48.92 ATRIAL FLUTTER (HCC): Primary | ICD-10-CM

## 2024-01-18 DIAGNOSIS — Z01.810 PRE-OPERATIVE CARDIOVASCULAR EXAMINATION: ICD-10-CM

## 2024-01-18 DIAGNOSIS — I48.91 ATRIAL FIBRILLATION, UNSPECIFIED TYPE (HCC): ICD-10-CM

## 2024-01-18 DIAGNOSIS — I25.10 CAD (CORONARY ARTERY DISEASE): Primary | ICD-10-CM

## 2024-01-18 LAB
ANION GAP SERPL CALCULATED.3IONS-SCNC: 15 MMOL/L (ref 7–16)
BASOPHILS ABSOLUTE: 0.1 K/CU MM
BASOPHILS RELATIVE PERCENT: 0.6 % (ref 0–1)
BUN SERPL-MCNC: 20 MG/DL (ref 6–23)
CALCIUM SERPL-MCNC: 8.7 MG/DL (ref 8.3–10.6)
CHLORIDE BLD-SCNC: 100 MMOL/L (ref 99–110)
CO2: 22 MMOL/L (ref 21–32)
CREAT SERPL-MCNC: 0.8 MG/DL (ref 0.9–1.3)
DIFFERENTIAL TYPE: ABNORMAL
EOSINOPHILS ABSOLUTE: 0.1 K/CU MM
EOSINOPHILS RELATIVE PERCENT: 1.2 % (ref 0–3)
GFR SERPL CREATININE-BSD FRML MDRD: >60 ML/MIN/1.73M2
GLUCOSE SERPL-MCNC: 118 MG/DL (ref 70–99)
HCT VFR BLD CALC: 46.3 % (ref 42–52)
HEMOGLOBIN: 16 GM/DL (ref 13.5–18)
IMMATURE NEUTROPHIL %: 0.2 % (ref 0–0.43)
LYMPHOCYTES ABSOLUTE: 3.3 K/CU MM
LYMPHOCYTES RELATIVE PERCENT: 30.2 % (ref 24–44)
MAGNESIUM: 2.3 MG/DL (ref 1.8–2.4)
MCH RBC QN AUTO: 30.4 PG (ref 27–31)
MCHC RBC AUTO-ENTMCNC: 34.6 % (ref 32–36)
MCV RBC AUTO: 88 FL (ref 78–100)
MONOCYTES ABSOLUTE: 0.8 K/CU MM
MONOCYTES RELATIVE PERCENT: 7.3 % (ref 0–4)
NUCLEATED RBC %: 0 %
PDW BLD-RTO: 12.6 % (ref 11.7–14.9)
PHOSPHORUS: 4.2 MG/DL (ref 2.5–4.9)
PLATELET # BLD: 277 K/CU MM (ref 140–440)
PMV BLD AUTO: 9.9 FL (ref 7.5–11.1)
POTASSIUM SERPL-SCNC: 4.8 MMOL/L (ref 3.5–5.1)
RBC # BLD: 5.26 M/CU MM (ref 4.6–6.2)
SEGMENTED NEUTROPHILS ABSOLUTE COUNT: 6.6 K/CU MM
SEGMENTED NEUTROPHILS RELATIVE PERCENT: 60.5 % (ref 36–66)
SODIUM BLD-SCNC: 137 MMOL/L (ref 135–145)
TOTAL IMMATURE NEUTOROPHIL: 0.02 K/CU MM
TOTAL NUCLEATED RBC: 0 K/CU MM
WBC # BLD: 10.8 K/CU MM (ref 4–10.5)

## 2024-01-18 PROCEDURE — 84100 ASSAY OF PHOSPHORUS: CPT

## 2024-01-18 PROCEDURE — 87635 SARS-COV-2 COVID-19 AMP PRB: CPT

## 2024-01-18 PROCEDURE — 85025 COMPLETE CBC W/AUTO DIFF WBC: CPT

## 2024-01-18 PROCEDURE — 36415 COLL VENOUS BLD VENIPUNCTURE: CPT

## 2024-01-18 PROCEDURE — 83735 ASSAY OF MAGNESIUM: CPT

## 2024-01-18 PROCEDURE — 80048 BASIC METABOLIC PNL TOTAL CA: CPT

## 2024-01-18 PROCEDURE — 71046 X-RAY EXAM CHEST 2 VIEWS: CPT

## 2024-01-18 NOTE — TELEPHONE ENCOUNTER
Patient was educated by phone on [facility] for Dx: CAD.  Procedure is scheduled for 1/23/24 @ 9:30, w/arrival @ 7:30, @ [facility]. Pre-admission orders were given to patient for labs & CXR, which are due 1/18/24 @ [facility].       Procedure and risks were explained to patient. Consent forms were signed.       Patient was notified that procedure could be delayed due to an emergency. Patient voiced understanding.

## 2024-01-18 NOTE — TELEPHONE ENCOUNTER
[facility]     Dr. Ha Hall     LEFT HEART CATHETERIZATION WITH POSSIBLE PERCUTANEOUS CORONARY INTERVENTION          Patient Name: David Guillen   : 1955  MRN# [id number]    Date of Procedure: 24 Time:  Arrival Time:     The catheterization and angiogram are usually outpatient procedures, however if stenting is needed you may need to stay overnight. You will need to arrive at the hospital two hours before the procedure.  You will go to registration in the main lobby.  You will need to arrange for someone to drive you home.      HOSPITAL:  [facility])      X   If you have received orders for blood work and or a chest x-ray, please have         them done on assigned date at [facility],           [facility], or [facility].     X Please do not have anything by mouth after midnight prior to or 8 hours before   the procedure.    X You may take your medications with a sip of water in the morning of your               procedure or take them with you to the hospital                      X If you take  Eliquis, you should hold it for 48 hours before your procedure.     X If you are taking Metformin (Glucophage) or any medication containing Metformin (Glucophage) you must hold this medication the day before the procedure 23 , the day of your procedure and two days after your procedure.  You may restart Metformin two days after your procedure on 24.     X If you take insulin,  you will need to take ½ the dose on the morning of the procedure or the night before.  Do not take regular insulin dose the morning of the procedure.    X If you take Viagra (Sildenafil) or Cialis (Tadalafil) you will need to hold it for 3 days before your procedure.

## 2024-01-18 NOTE — PROGRESS NOTES
Patient here in office and educated on A fib/flutter ablation, schedule for 1/25/24 @ 1030, with arrival @ 0830, @ [facility]; risk explained; and consents signed. Also copy of orders given for labs and CXR due 1/18/24 at [facility]. Instruction given to patient to :  NPO after midnight the night before procedure; call hospital at [phone] to pre-register. May take rest of morning meds of procedure. Hold Metformin the day before, the day of and two days after procedure.  Hold ALL blood pressure medications morning of procedure. Hold Eliquis the evening dose night before procedure and Morning dose of the procedure.   Patient voiced understanding. Copies of consent & info scanned in chart.

## 2024-01-18 NOTE — TELEPHONE ENCOUNTER
[facility]     Dr. Ha Hall     LEFT HEART CATHETERIZATION WITH POSSIBLE PERCUTANEOUS CORONARY INTERVENTION            Patient Name: David Guillen   : 1955  MRN# [id number]    Date of Procedure: 24 Time: 9:30 Arrival Time: 7:30    The catheterization and angiogram are usually outpatient procedures, however if stenting is needed you may need to stay overnight. You will need to arrive at the hospital two hours before the procedure.  You will go to registration in the main lobby.  You will need to arrange for someone to drive you home.      HOSPITAL:  [facility])      X   If you have received orders for blood work and or a chest x-ray, please have         them done on assigned date at [facility],           [facility], or [facility].     X Please do not have anything by mouth after midnight prior to or 8 hours before   the procedure.    X You may take your medications with a sip of water in the morning of your               procedure or take them with you to the hospital          X If you take  Eliquis, you should hold it for 48 hours before your procedure.     X If you are taking Metformin (Glucophage) or any medication containing Metformin (Glucophage) you must hold this medication the day before the procedure 24 , the day of your procedure and two days after your procedure.  You may restart Metformin two days after your procedure on 23.      X If you take Viagra (Sildenafil) or Cialis (Tadalafil) you will need to hold it for 3 days before your procedure.

## 2024-01-19 ENCOUNTER — HOSPITAL ENCOUNTER (OUTPATIENT)
Age: 69
Discharge: HOME | End: 2024-01-19
Payer: MEDICARE

## 2024-01-19 DIAGNOSIS — Z01.810 PRE-OPERATIVE CARDIOVASCULAR EXAMINATION: ICD-10-CM

## 2024-01-19 DIAGNOSIS — E11.9 TYPE 2 DIABETES MELLITUS WITHOUT COMPLICATION, WITHOUT LONG-TERM CURRENT USE OF INSULIN (HCC): ICD-10-CM

## 2024-01-19 LAB
ABO/RH: NORMAL
ANTIBODY SCREEN: NEGATIVE
COMMENT: NORMAL
SARS-COV-2 RNA RESP QL NAA+PROBE: NOT DETECTED
SOURCE: NORMAL

## 2024-01-19 PROCEDURE — 86900 BLOOD TYPING SEROLOGIC ABO: CPT

## 2024-01-19 PROCEDURE — 36415 COLL VENOUS BLD VENIPUNCTURE: CPT

## 2024-01-19 PROCEDURE — 86901 BLOOD TYPING SEROLOGIC RH(D): CPT

## 2024-01-19 PROCEDURE — 86850 RBC ANTIBODY SCREEN: CPT

## 2024-01-19 RX ORDER — DULAGLUTIDE 0.75 MG/.5ML
INJECTION, SOLUTION SUBCUTANEOUS
Qty: 4 ADJUSTABLE DOSE PRE-FILLED PEN SYRINGE | Refills: 2 | Status: SHIPPED | OUTPATIENT
Start: 2024-01-19

## 2024-01-22 RX ORDER — ATORVASTATIN CALCIUM 10 MG/1
TABLET, FILM COATED ORAL
Qty: 90 TABLET | Refills: 1 | Status: SHIPPED | OUTPATIENT
Start: 2024-01-22

## 2024-01-23 ENCOUNTER — HOSPITAL ENCOUNTER (OUTPATIENT)
Age: 69
Setting detail: OUTPATIENT SURGERY
Discharge: HOME | End: 2024-01-23
Attending: INTERNAL MEDICINE | Admitting: INTERNAL MEDICINE
Payer: MEDICARE

## 2024-01-23 VITALS
WEIGHT: 252 LBS | SYSTOLIC BLOOD PRESSURE: 128 MMHG | HEIGHT: 70 IN | BODY MASS INDEX: 36.08 KG/M2 | HEART RATE: 85 BPM | RESPIRATION RATE: 20 BRPM | DIASTOLIC BLOOD PRESSURE: 85 MMHG | TEMPERATURE: 96.5 F | OXYGEN SATURATION: 96 %

## 2024-01-23 DIAGNOSIS — I25.10 CAD (CORONARY ARTERY DISEASE): ICD-10-CM

## 2024-01-23 LAB
ECHO BSA: 2.38 M2
GLUCOSE BLD-MCNC: 120 MG/DL (ref 70–99)

## 2024-01-23 PROCEDURE — 6360000004 HC RX CONTRAST MEDICATION

## 2024-01-23 PROCEDURE — C1894 INTRO/SHEATH, NON-LASER: HCPCS | Performed by: INTERNAL MEDICINE

## 2024-01-23 PROCEDURE — 93459 L HRT ART/GRFT ANGIO: CPT | Performed by: INTERNAL MEDICINE

## 2024-01-23 PROCEDURE — 6360000004 HC RX CONTRAST MEDICATION: Performed by: INTERNAL MEDICINE

## 2024-01-23 PROCEDURE — 6370000000 HC RX 637 (ALT 250 FOR IP): Performed by: INTERNAL MEDICINE

## 2024-01-23 PROCEDURE — 82962 GLUCOSE BLOOD TEST: CPT

## 2024-01-23 PROCEDURE — 7100000011 HC PHASE II RECOVERY - ADDTL 15 MIN: Performed by: INTERNAL MEDICINE

## 2024-01-23 PROCEDURE — 2500000003 HC RX 250 WO HCPCS

## 2024-01-23 PROCEDURE — 6360000002 HC RX W HCPCS: Performed by: INTERNAL MEDICINE

## 2024-01-23 PROCEDURE — 2580000003 HC RX 258: Performed by: INTERNAL MEDICINE

## 2024-01-23 PROCEDURE — 2500000003 HC RX 250 WO HCPCS: Performed by: INTERNAL MEDICINE

## 2024-01-23 PROCEDURE — 2709999900 HC NON-CHARGEABLE SUPPLY: Performed by: INTERNAL MEDICINE

## 2024-01-23 PROCEDURE — C1769 GUIDE WIRE: HCPCS | Performed by: INTERNAL MEDICINE

## 2024-01-23 PROCEDURE — 6360000002 HC RX W HCPCS

## 2024-01-23 PROCEDURE — 7100000010 HC PHASE II RECOVERY - FIRST 15 MIN: Performed by: INTERNAL MEDICINE

## 2024-01-23 RX ORDER — DIAZEPAM 5 MG/1
5 TABLET ORAL ONCE
Status: COMPLETED | OUTPATIENT
Start: 2024-01-23 | End: 2024-01-23

## 2024-01-23 RX ORDER — DIPHENHYDRAMINE HCL 25 MG
25 TABLET ORAL ONCE
Status: COMPLETED | OUTPATIENT
Start: 2024-01-23 | End: 2024-01-23

## 2024-01-23 RX ORDER — ACETAMINOPHEN 325 MG/1
650 TABLET ORAL EVERY 4 HOURS PRN
Status: DISCONTINUED | OUTPATIENT
Start: 2024-01-23 | End: 2024-01-23 | Stop reason: HOSPADM

## 2024-01-23 RX ORDER — SODIUM CHLORIDE 9 MG/ML
INJECTION, SOLUTION INTRAVENOUS CONTINUOUS PRN
Status: COMPLETED | OUTPATIENT
Start: 2024-01-23 | End: 2024-01-23

## 2024-01-23 RX ORDER — SODIUM CHLORIDE 9 MG/ML
INJECTION, SOLUTION INTRAVENOUS PRN
Status: DISCONTINUED | OUTPATIENT
Start: 2024-01-23 | End: 2024-01-23 | Stop reason: HOSPADM

## 2024-01-23 RX ORDER — SODIUM CHLORIDE 0.9 % (FLUSH) 0.9 %
5-40 SYRINGE (ML) INJECTION EVERY 12 HOURS SCHEDULED
Status: DISCONTINUED | OUTPATIENT
Start: 2024-01-23 | End: 2024-01-23 | Stop reason: HOSPADM

## 2024-01-23 RX ORDER — SODIUM CHLORIDE 0.9 % (FLUSH) 0.9 %
5-40 SYRINGE (ML) INJECTION PRN
Status: DISCONTINUED | OUTPATIENT
Start: 2024-01-23 | End: 2024-01-23 | Stop reason: HOSPADM

## 2024-01-23 RX ORDER — HEPARIN SODIUM 200 [USP'U]/100ML
INJECTION, SOLUTION INTRAVENOUS PRN
Status: DISCONTINUED | OUTPATIENT
Start: 2024-01-23 | End: 2024-01-23 | Stop reason: HOSPADM

## 2024-01-23 RX ORDER — SODIUM CHLORIDE 9 MG/ML
INJECTION, SOLUTION INTRAVENOUS CONTINUOUS
Status: DISCONTINUED | OUTPATIENT
Start: 2024-01-23 | End: 2024-01-23 | Stop reason: HOSPADM

## 2024-01-23 RX ADMIN — SODIUM CHLORIDE: 9 INJECTION, SOLUTION INTRAVENOUS at 07:57

## 2024-01-23 RX ADMIN — DIAZEPAM 5 MG: 5 TABLET ORAL at 07:59

## 2024-01-23 RX ADMIN — DIPHENHYDRAMINE HYDROCHLORIDE 25 MG: 25 TABLET ORAL at 07:59

## 2024-01-23 NOTE — NURSE NAVIGATOR
Discharge instructions discussed and questions answered. IV discontinued and DSD applied. Pt ambulates to front entrance to private auto

## 2024-01-23 NOTE — H&P
multivessel disease  High-grade stenosis of LAD circumflex and RCA is possibly due to disease heavy calcification tandem lesion  SVG to RCA is patent with mild calcification in the midsegment  S/p aortic valve replacement artifact seen  Myocardium does not show any evidence of infarct or thickening.                 -  Hypertension: Patients blood pressure is abnormal. Patient is advised about low sodium diet. Present medical regimen will not be changed.    On Coreg blood pressure is well controlled however he has put it on hold for now     -  LIPID MANAGEMENT:  Importance of lipid levels discussed with patient   and patient was given dietary advice. NCEP- ATP III guidelines reviewed with patient.    -   Changes  in medicines made: No     On Lipitor 10 mg p.o. daily we will check the LDL     -   DIABETES MELLITUS: Available pertinent lab data reviewed   and  patient was given dietary advice . Advised to check blood glucose level on a regular basis.      -   Changes  in medicines made: No        Metformin we will check the A1c patient compliant        Mortality from the morbid obesity is very high:     Body mass index is 36.22 kg/m².        Interpretation summary:  Heavy calcification with multivessel disease  High-grade stenosis of LAD circumflex and RCA is possibly due to disease heavy calcification tandem lesion  SVG to RCA is patent with mild calcification in the midsegment  S/p aortic valve replacement artifact seen  Myocardium does not show any evidence of infarct or thickening.

## 2024-01-23 NOTE — DISCHARGE INSTRUCTIONS
Home instruction for Radial site Heart Catheterization:    Do not lift with affected arm for 3 days. Avoid lifting >10lbs. No flexing or bending affected wrist.    Remove dressing the next day, keep area clean and dry until healed.    Do not submerge site in water for 3 days.    Slight tenderness is normal, but notify doctor if tingling of fingers/hand happens.    If bleeding or hematoma (blood collecting under the skin) occurs at site, apply pressure to slow the bleeding and notify doctor immediately.    No driving for 3 days.    For your procedure you may have been given a sedative to help you relax. This drug will make you sleepy. It is usually given in a vein (by IV).  Don't do anything for 24 hours that requires attention to detail. It takes time for the medicine effects to completely wear off.  Do not sign any legally binding contracts or documents over the next 24 hours.  For your safety, you should not drive or operate any machinery that could be dangerous until the medicine wears off and you can think clearly and react easily.

## 2024-01-24 ENCOUNTER — ANESTHESIA EVENT (OUTPATIENT)
Age: 69
End: 2024-01-24
Payer: MEDICARE

## 2024-01-24 ENCOUNTER — TELEPHONE (OUTPATIENT)
Dept: CARDIOLOGY CLINIC | Age: 69
End: 2024-01-24

## 2024-01-24 ASSESSMENT — LIFESTYLE VARIABLES: SMOKING_STATUS: 0

## 2024-01-24 NOTE — TELEPHONE ENCOUNTER
Called patient pre-procedure for 1/25/2024 a-flutter possible a -fib ablation. Patient denies any questions at present. All pre instructions given and patient voiced understanding. Patient also advised of overnight admission. Patient stated understanding with no c/o noted at present.

## 2024-01-24 NOTE — ANESTHESIA PRE PROCEDURE
Department of Anesthesiology  Preprocedure Note       Name:  David Guillen   Age:  68 y.o.  :  1955                                          MRN:  [id number]         Date:  2024      Surgeon: Surgeon(s):  David Coleman MD    Procedure: Procedure(s):  Ablation A-flutter with possible a-fib ablation and ADIEL @ 1030    Medications prior to admission:   Prior to Admission medications    Medication Sig Start Date End Date Taking? Authorizing Provider   atorvastatin (LIPITOR) 10 MG tablet TAKE 1 TABLET BY MOUTH EVERY DAY 24   Ann Murcia,    TRULICITY 0.75 MG/0.5ML SOPN INJECT 0.75 MG SUBCUTANEOUSLY ONE TIME PER WEEK 24   Ann Murcia DO   aspirin 81 MG EC tablet TAKE 1 TABLET BY MOUTH EVERY DAY 24   Ann Murcia DO   ELIQUIS 5 MG TABS tablet TAKE 1 TABLET BY MOUTH TWICE A DAY 24   Ann Murcia DO   metFORMIN (GLUCOPHAGE-XR) 500 MG extended release tablet TAKE 4 TABLETS BY MOUTH DAILY (WITH BREAKFAST). 23   Ann Murcia DO   carvedilol (COREG) 25 MG tablet TAKE 1 TABLET BY MOUTH TWICE A DAY 23   Ann Murcia DO   hydrocortisone 2.5 % cream Apply topically 2 times daily.  Patient not taking: Reported on 2023   Ann Murcia DO   clotrimazole-betamethasone (LOTRISONE) 1-0.05 % cream Apply topically 2 times daily. 23   Ann Murcia DO   MILK THISTLE PO Take by mouth    Ashley Stokes MD   Melatonin 5 MG CAPS Take 1 tablet by mouth nightly as needed     Ashley Stokes MD   Multiple Vitamins-Minerals (PX SENIOR VITAMIN PO) Take 1 tablet by mouth daily    ProviderAshley MD       Current medications:    No current facility-administered medications for this encounter.     Current Outpatient Medications   Medication Sig Dispense Refill   • atorvastatin (LIPITOR) 10 MG tablet TAKE 1 TABLET BY MOUTH EVERY DAY 90 tablet 1   • TRULICITY 0.75 MG/0.5ML SOPN INJECT 0.75 MG SUBCUTANEOUSLY ONE TIME PER WEEK 4    (+) morbid obesity          Endo/Other:    (+) DiabetesType II DM.                 Abdominal:   (+) obese          Vascular: negative vascular ROS.         Other Findings:       Anesthesia Plan      general     ASA 3     (Preop chartreview only. )  Induction: intravenous.  arterial line    Anesthetic plan and risks discussed with patient.      Plan discussed with CRNA.                Jose Shepherd, TASHA - CRNA   1/24/2024

## 2024-01-25 ENCOUNTER — HOSPITAL ENCOUNTER (OUTPATIENT)
Age: 69
Discharge: HOME | End: 2024-01-25
Attending: INTERNAL MEDICINE | Admitting: INTERNAL MEDICINE
Payer: MEDICARE

## 2024-01-25 ENCOUNTER — APPOINTMENT (OUTPATIENT)
Dept: NON INVASIVE DIAGNOSTICS | Age: 69
End: 2024-01-25
Attending: INTERNAL MEDICINE
Payer: MEDICARE

## 2024-01-25 ENCOUNTER — ANESTHESIA (OUTPATIENT)
Age: 69
End: 2024-01-25
Payer: MEDICARE

## 2024-01-25 VITALS
BODY MASS INDEX: 34.22 KG/M2 | RESPIRATION RATE: 14 BRPM | HEART RATE: 102 BPM | WEIGHT: 239 LBS | OXYGEN SATURATION: 94 % | HEIGHT: 70 IN | DIASTOLIC BLOOD PRESSURE: 78 MMHG | TEMPERATURE: 97 F | SYSTOLIC BLOOD PRESSURE: 111 MMHG

## 2024-01-25 DIAGNOSIS — I48.3 TYPICAL ATRIAL FLUTTER (HCC): Primary | ICD-10-CM

## 2024-01-25 DIAGNOSIS — I48.92 ATRIAL FLUTTER (HCC): ICD-10-CM

## 2024-01-25 LAB
ABO/RH: NORMAL
ACTIVATED CLOTTING TIME, LOW RANGE: 164 SEC
ANTIBODY SCREEN: NEGATIVE
BASE EXCESS MIXED: 1.7 (ref 0–3)
BASE EXCESS: ABNORMAL (ref 0–3)
CO2 CONTENT: 25.1 MMOL/L (ref 21–32)
ECHO BSA: 2.31 M2
ECHO BSA: 2.31 M2
EKG ATRIAL RATE: 88 BPM
EKG DIAGNOSIS: NORMAL
EKG P AXIS: 62 DEGREES
EKG P-R INTERVAL: 208 MS
EKG Q-T INTERVAL: 404 MS
EKG QRS DURATION: 98 MS
EKG QTC CALCULATION (BAZETT): 488 MS
EKG R AXIS: 48 DEGREES
EKG T AXIS: 52 DEGREES
EKG VENTRICULAR RATE: 88 BPM
GLUCOSE BLD-MCNC: 113 MG/DL (ref 70–99)
GLUCOSE BLD-MCNC: 124 MG/DL (ref 70–99)
GLUCOSE BLD-MCNC: 134 MG/DL (ref 70–99)
HCO3 ARTERIAL: 23.8 MMOL/L (ref 21–28)
HCT VFR BLD CALC: 42 % (ref 38–51)
HEMOGLOBIN: 14.3 GM/DL (ref 12–17)
O2 SATURATION: 100 % (ref 94–98)
PCO2 ARTERIAL: 41.8 MMHG (ref 35–48)
PH BLOOD: 7.36 (ref 7.35–7.45)
PO2 ARTERIAL: 478.3 MMHG (ref 83–108)
POC CALCIUM: 1.17 MMOL/L (ref 1.15–1.33)
POTASSIUM SERPL-SCNC: 3.9 MMOL/L (ref 3.5–5.1)
SODIUM BLD-SCNC: 141 MMOL/L (ref 135–145)
SOURCE, BLOOD GAS: ABNORMAL

## 2024-01-25 PROCEDURE — 82803 BLOOD GASES ANY COMBINATION: CPT

## 2024-01-25 PROCEDURE — 93653 COMPRE EP EVAL TX SVT: CPT | Performed by: INTERNAL MEDICINE

## 2024-01-25 PROCEDURE — 86901 BLOOD TYPING SEROLOGIC RH(D): CPT

## 2024-01-25 PROCEDURE — 85018 HEMOGLOBIN: CPT

## 2024-01-25 PROCEDURE — 93312 ECHO TRANSESOPHAGEAL: CPT | Performed by: INTERNAL MEDICINE

## 2024-01-25 PROCEDURE — 93325 DOPPLER ECHO COLOR FLOW MAPG: CPT | Performed by: INTERNAL MEDICINE

## 2024-01-25 PROCEDURE — C1732 CATH, EP, DIAG/ABL, 3D/VECT: HCPCS | Performed by: INTERNAL MEDICINE

## 2024-01-25 PROCEDURE — 7100000001 HC PACU RECOVERY - ADDTL 15 MIN: Performed by: INTERNAL MEDICINE

## 2024-01-25 PROCEDURE — C1894 INTRO/SHEATH, NON-LASER: HCPCS | Performed by: INTERNAL MEDICINE

## 2024-01-25 PROCEDURE — C1733 CATH, EP, OTHR THAN COOL-TIP: HCPCS | Performed by: INTERNAL MEDICINE

## 2024-01-25 PROCEDURE — 3700000000 HC ANESTHESIA ATTENDED CARE: Performed by: INTERNAL MEDICINE

## 2024-01-25 PROCEDURE — 82330 ASSAY OF CALCIUM: CPT

## 2024-01-25 PROCEDURE — 6360000002 HC RX W HCPCS

## 2024-01-25 PROCEDURE — 7100000000 HC PACU RECOVERY - FIRST 15 MIN: Performed by: INTERNAL MEDICINE

## 2024-01-25 PROCEDURE — 7100000010 HC PHASE II RECOVERY - FIRST 15 MIN: Performed by: INTERNAL MEDICINE

## 2024-01-25 PROCEDURE — 6360000002 HC RX W HCPCS: Performed by: INTERNAL MEDICINE

## 2024-01-25 PROCEDURE — 85347 COAGULATION TIME ACTIVATED: CPT

## 2024-01-25 PROCEDURE — 2580000003 HC RX 258

## 2024-01-25 PROCEDURE — 85014 HEMATOCRIT: CPT

## 2024-01-25 PROCEDURE — 93005 ELECTROCARDIOGRAM TRACING: CPT | Performed by: INTERNAL MEDICINE

## 2024-01-25 PROCEDURE — 84295 ASSAY OF SERUM SODIUM: CPT

## 2024-01-25 PROCEDURE — 2709999900 HC NON-CHARGEABLE SUPPLY: Performed by: INTERNAL MEDICINE

## 2024-01-25 PROCEDURE — 93320 DOPPLER ECHO COMPLETE: CPT | Performed by: INTERNAL MEDICINE

## 2024-01-25 PROCEDURE — 2720000010 HC SURG SUPPLY STERILE: Performed by: INTERNAL MEDICINE

## 2024-01-25 PROCEDURE — 86850 RBC ANTIBODY SCREEN: CPT

## 2024-01-25 PROCEDURE — 2500000003 HC RX 250 WO HCPCS

## 2024-01-25 PROCEDURE — 93312 ECHO TRANSESOPHAGEAL: CPT

## 2024-01-25 PROCEDURE — C1730 CATH, EP, 19 OR FEW ELECT: HCPCS | Performed by: INTERNAL MEDICINE

## 2024-01-25 PROCEDURE — 84132 ASSAY OF SERUM POTASSIUM: CPT

## 2024-01-25 PROCEDURE — 3700000001 HC ADD 15 MINUTES (ANESTHESIA): Performed by: INTERNAL MEDICINE

## 2024-01-25 PROCEDURE — 82962 GLUCOSE BLOOD TEST: CPT

## 2024-01-25 PROCEDURE — 93010 ELECTROCARDIOGRAM REPORT: CPT | Performed by: INTERNAL MEDICINE

## 2024-01-25 PROCEDURE — 7100000011 HC PHASE II RECOVERY - ADDTL 15 MIN: Performed by: INTERNAL MEDICINE

## 2024-01-25 PROCEDURE — 86900 BLOOD TYPING SEROLOGIC ABO: CPT

## 2024-01-25 RX ORDER — ONDANSETRON 2 MG/ML
INJECTION INTRAMUSCULAR; INTRAVENOUS PRN
Status: DISCONTINUED | OUTPATIENT
Start: 2024-01-25 | End: 2024-01-25 | Stop reason: SDUPTHER

## 2024-01-25 RX ORDER — DIPHENHYDRAMINE HYDROCHLORIDE 50 MG/ML
12.5 INJECTION INTRAMUSCULAR; INTRAVENOUS
Status: DISCONTINUED | OUTPATIENT
Start: 2024-01-25 | End: 2024-01-25 | Stop reason: HOSPADM

## 2024-01-25 RX ORDER — LIDOCAINE HYDROCHLORIDE 20 MG/ML
INJECTION, SOLUTION INFILTRATION; PERINEURAL PRN
Status: DISCONTINUED | OUTPATIENT
Start: 2024-01-25 | End: 2024-01-25 | Stop reason: SDUPTHER

## 2024-01-25 RX ORDER — OXYCODONE HYDROCHLORIDE 5 MG/1
5 TABLET ORAL
Status: DISCONTINUED | OUTPATIENT
Start: 2024-01-25 | End: 2024-01-25 | Stop reason: HOSPADM

## 2024-01-25 RX ORDER — HEPARIN SODIUM 10000 [USP'U]/ML
INJECTION, SOLUTION INTRAVENOUS; SUBCUTANEOUS PRN
Status: DISCONTINUED | OUTPATIENT
Start: 2024-01-25 | End: 2024-01-25 | Stop reason: HOSPADM

## 2024-01-25 RX ORDER — SODIUM CHLORIDE, SODIUM LACTATE, POTASSIUM CHLORIDE, CALCIUM CHLORIDE 600; 310; 30; 20 MG/100ML; MG/100ML; MG/100ML; MG/100ML
INJECTION, SOLUTION INTRAVENOUS ONCE
Status: DISCONTINUED | OUTPATIENT
Start: 2024-01-25 | End: 2024-01-25 | Stop reason: HOSPADM

## 2024-01-25 RX ORDER — SODIUM CHLORIDE 0.9 % (FLUSH) 0.9 %
5-40 SYRINGE (ML) INJECTION EVERY 12 HOURS SCHEDULED
Status: DISCONTINUED | OUTPATIENT
Start: 2024-01-25 | End: 2024-01-25 | Stop reason: HOSPADM

## 2024-01-25 RX ORDER — DEXAMETHASONE SODIUM PHOSPHATE 4 MG/ML
INJECTION, SOLUTION INTRA-ARTICULAR; INTRALESIONAL; INTRAMUSCULAR; INTRAVENOUS; SOFT TISSUE PRN
Status: DISCONTINUED | OUTPATIENT
Start: 2024-01-25 | End: 2024-01-25 | Stop reason: SDUPTHER

## 2024-01-25 RX ORDER — LABETALOL HYDROCHLORIDE 5 MG/ML
10 INJECTION, SOLUTION INTRAVENOUS
Status: DISCONTINUED | OUTPATIENT
Start: 2024-01-25 | End: 2024-01-25 | Stop reason: HOSPADM

## 2024-01-25 RX ORDER — FENTANYL CITRATE 50 UG/ML
INJECTION, SOLUTION INTRAMUSCULAR; INTRAVENOUS PRN
Status: DISCONTINUED | OUTPATIENT
Start: 2024-01-25 | End: 2024-01-25 | Stop reason: SDUPTHER

## 2024-01-25 RX ORDER — PROPOFOL 10 MG/ML
INJECTION, EMULSION INTRAVENOUS PRN
Status: DISCONTINUED | OUTPATIENT
Start: 2024-01-25 | End: 2024-01-25 | Stop reason: SDUPTHER

## 2024-01-25 RX ORDER — ROCURONIUM BROMIDE 10 MG/ML
INJECTION, SOLUTION INTRAVENOUS PRN
Status: DISCONTINUED | OUTPATIENT
Start: 2024-01-25 | End: 2024-01-25 | Stop reason: SDUPTHER

## 2024-01-25 RX ORDER — SODIUM CHLORIDE 0.9 % (FLUSH) 0.9 %
5-40 SYRINGE (ML) INJECTION PRN
Status: DISCONTINUED | OUTPATIENT
Start: 2024-01-25 | End: 2024-01-25 | Stop reason: HOSPADM

## 2024-01-25 RX ORDER — ONDANSETRON 2 MG/ML
4 INJECTION INTRAMUSCULAR; INTRAVENOUS
Status: DISCONTINUED | OUTPATIENT
Start: 2024-01-25 | End: 2024-01-25 | Stop reason: HOSPADM

## 2024-01-25 RX ORDER — FENTANYL CITRATE 50 UG/ML
25 INJECTION, SOLUTION INTRAMUSCULAR; INTRAVENOUS EVERY 5 MIN PRN
Status: DISCONTINUED | OUTPATIENT
Start: 2024-01-25 | End: 2024-01-25 | Stop reason: HOSPADM

## 2024-01-25 RX ORDER — HYDRALAZINE HYDROCHLORIDE 20 MG/ML
10 INJECTION INTRAMUSCULAR; INTRAVENOUS
Status: DISCONTINUED | OUTPATIENT
Start: 2024-01-25 | End: 2024-01-25 | Stop reason: HOSPADM

## 2024-01-25 RX ORDER — DROPERIDOL 2.5 MG/ML
0.62 INJECTION, SOLUTION INTRAMUSCULAR; INTRAVENOUS EVERY 10 MIN PRN
Status: DISCONTINUED | OUTPATIENT
Start: 2024-01-25 | End: 2024-01-25 | Stop reason: HOSPADM

## 2024-01-25 RX ORDER — SODIUM CHLORIDE 9 MG/ML
INJECTION, SOLUTION INTRAVENOUS CONTINUOUS PRN
Status: DISCONTINUED | OUTPATIENT
Start: 2024-01-25 | End: 2024-01-25 | Stop reason: SDUPTHER

## 2024-01-25 RX ADMIN — ROCURONIUM BROMIDE 10 MG: 10 INJECTION INTRAVENOUS at 12:07

## 2024-01-25 RX ADMIN — ONDANSETRON 4 MG: 2 INJECTION INTRAMUSCULAR; INTRAVENOUS at 12:01

## 2024-01-25 RX ADMIN — PROPOFOL 150 MG: 10 INJECTION, EMULSION INTRAVENOUS at 11:19

## 2024-01-25 RX ADMIN — DEXAMETHASONE SODIUM PHOSPHATE 4 MG: 4 INJECTION, SOLUTION INTRAMUSCULAR; INTRAVENOUS at 11:31

## 2024-01-25 RX ADMIN — PHENYLEPHRINE HYDROCHLORIDE 100 MCG: 10 INJECTION INTRAVENOUS at 11:34

## 2024-01-25 RX ADMIN — FENTANYL CITRATE 50 MCG: 50 INJECTION, SOLUTION INTRAMUSCULAR; INTRAVENOUS at 11:19

## 2024-01-25 RX ADMIN — SUGAMMADEX 200 MG: 100 INJECTION, SOLUTION INTRAVENOUS at 12:15

## 2024-01-25 RX ADMIN — FENTANYL CITRATE 50 MCG: 50 INJECTION, SOLUTION INTRAMUSCULAR; INTRAVENOUS at 11:10

## 2024-01-25 RX ADMIN — LIDOCAINE HYDROCHLORIDE 60 MG: 20 INJECTION, SOLUTION INFILTRATION; PERINEURAL at 11:19

## 2024-01-25 RX ADMIN — PROPOFOL 50 MG: 10 INJECTION, EMULSION INTRAVENOUS at 11:57

## 2024-01-25 RX ADMIN — ROCURONIUM BROMIDE 50 MG: 10 INJECTION INTRAVENOUS at 11:19

## 2024-01-25 RX ADMIN — SODIUM CHLORIDE: 900 INJECTION INTRAVENOUS at 11:06

## 2024-01-25 ASSESSMENT — ENCOUNTER SYMPTOMS
WHEEZING: 0
DIARRHEA: 0
COLOR CHANGE: 0
EYE PAIN: 0
PHOTOPHOBIA: 0
SHORTNESS OF BREATH: 1
COUGH: 0
CONSTIPATION: 0
NAUSEA: 0
ABDOMINAL PAIN: 0
VOMITING: 0
CHEST TIGHTNESS: 0
BACK PAIN: 0
BLOOD IN STOOL: 0

## 2024-01-25 ASSESSMENT — PAIN SCALES - GENERAL
PAINLEVEL_OUTOF10: 0

## 2024-01-25 NOTE — H&P
Electrophysiology H&p  Note      Reason for consultation: Atrial  flutter    Chief complaint :tiredness, shortness of breath    Referring physician:        Primary care physician: Ann Murcia DO      History of Present Illness:     Today visit (01/25/24)    Patient here for Atrial flutter ablation. No change in H&P noted from previous clinic visit.       Previous visit (12/22/2023)    Patient  is a 68-year-old male with past medical history as mentioned below.  Patient was referred by Dr. Hall as a reconsult for atrial flutter.  Patient was seen by me 5 years ago in the setting of postop A-fib from CABG.  Patient reports until recently had no issues but he has been having tiredness and shortness of breath with exertion recently and he was told that he was in irregular rhythm.  He was told he was in atrial flutter.  Patient has little symptoms.  Patient denies chest pain, dizziness or syncope.  Patient wants to consider procedure which was referred to him.  Patient is taking his medications as prescribed.    Previous visit: 3/28/2018      Chief Complaint   Patient presents with    Atrial Fibrillation     Pt here today per Dr. Hall's request, for A-fib w/RVR episode on holter monitor. Pt denies chest pain, palpitations, shortness of breath, dizziness, and edema. Patient had valve replacement. Patient denies palpitations. Tiredness and weakness which he feels post surgery,           Past medical history:   Past Medical History:   Diagnosis Date    A-fib ([facility])     Arthritis     Knees, Feet    Atrial fibrillation (HCC)     Back pain     Lower Back    CAD (coronary artery disease)     Diabetes mellitus ([facility])     H/O echocardiogram 12/14/2017    EF 50-55%. severe aortic stenosis is present, mod AR, mild MR and TR    H/O echocardiogram 10/07/2021    EF 55-60%. Mild mitral and tricuspid regurgitation is present.    H/O transesophageal echocardiography (ADIEL) for

## 2024-01-25 NOTE — NURSE NAVIGATOR
Pt returned to cath lab holding area room 2 from PACU. Bilateral groin dressings dry and intact. Pt denies complaints. Pt has not voided this admission, total fluid 900 cc. Will offer a urinal and reassess

## 2024-01-25 NOTE — PROGRESS NOTES
Discharge instructions discussed with patient. Verbalized understanding. Ambulated to rest room without difficulty. Returned to room, bilateral groins assessed. No ooze or hematoma. Dressing d&I

## 2024-01-25 NOTE — DISCHARGE INSTRUCTIONS
Discharge Home Instuctions  Name:David Guillen  :1955    Your instructions:    Shower only for the first 5 days, NO TUB BATHS.      Wash procedure site with mild soap, rinse and pat dry.  Do not rub over the procedure site for two (2) days.  Remove dressing in 2 days.    Site observations-Observe the area for bleeding or hematoma (lump under the skin caused by bleeding.)      A.  Painless bruising is normal.  B.  If a firmness/swelling seems to be increasing or there is bright red bleeding from site       (hold pressure over procedure site) and  CALL 911 IMMEDIATELY.    What to do after you leave the hospital:    Recommended activity: no lifting, Driving, or Strenuous exercise for 3 days.  DO NOT lift more than 10lbs.    For your procedure you may have been given a sedative to help you relax. This drug will make you sleepy. It is usually given in a vein (by IV).  Don't do anything for 24 hours that requires attention to detail. It takes time for the medicine effects to completely wear off.  Do not sign any legally binding contracts or documents over the next 24 hours.  For your safety, you should not drive or operate any machinery that could be dangerous until the medicine wears off and you can think clearly and react easily.    Follow-up with physician in 7-10 days.    Take your medication as ordered.      If you have any questions, you may call [phone] or your physicians office

## 2024-01-25 NOTE — PROGRESS NOTES
Dressed self, all belongings in possession, taken to front entrance per WC for dc home with DC instructions

## 2024-01-25 NOTE — PROGRESS NOTES
Pt arrived to cathlab holding. Monitors and alarms per protocol. Procedure discussed with patient, all questions answered.

## 2024-01-25 NOTE — PROGRESS NOTES
Date of Service: 02/17/2022    The patient presents.  He is a 33-year-old male that I am seeing for really right greater than left knee pain.  He had patellar tendinitis down to [place] and had a Tenex procedure 3 years ago in his knee, which did help until this past year where he had 3 episodes where his knee hit another person's playing volleyball and hyperextended.  Now he has pain more in the front of the knee.  He did go to team rehab yesterday and had one episode of dry needling which has cut his knee pain in half. His left knee does bother him with jumping, but it is not as bad.    PAST MEDICAL HISTORY, SURGICAL HISTORY, REVIEW OF SYSTEMS:    Reviewed.      ALLERGIES:   Vancomycin.     MEDICATIONS:   None.    SOCIAL HISTORY:    Does not smoke.    PHYSICAL EXAMINATION:    He is alert. Oriented to person, place and time.  Appropriate mood and affect, in no acute distress. 5 feet 9-1/2, 87 kilograms.  Body mass index 28.  The right knee exam is fairly benign.  No tenderness.  He has got really good motion.  The left knee definitely has tenderness over the patellar tendon inferior pole.    X-rays were reviewed which show a little bit of a bone elongation inferior pole of the patella on the right side, not the left side.    RECOMMENDATION:    He did have Tenex with a good result on the right knee.  I encouraged him maybe to go online. I think there are people at the [facility] that do that to pursue that if he wants to go that route. As far as his right knee, dry needling seems to have helped.  I would encourage him to go through about 6 weeks of therapy.  If it does not improve with that he will let me know and we could always do further imaging.      Dictated By: Ronald Cuadra MD  Signing Provider: MD YE Lundberg/allyssa ([id number])   DD: 02/17/2022 2:34:28 PM TD: 02/19/2022 10:00:16 AM       1235- pt. Arrived to pacu via cart from ep lab. Pt attached to monitor and alarms are on . Received report from Naresh Allen and KELSEY Huber. Pt. Is drowsy from anesthesia but responds to verbal stimuli and able to follow commands. Pt. Denies pain or n/v. Pt. Has bilateral groin sites. Sites are clean dry and intact. Site soft. Pt. IV infusing without any issues. Pt. Updated on plan of care and denies any other questions or concerns.   1245- pt. Had a large cough. RN checked groin site and noted bleeding in left groin. Manual pressure began.   1258- bleeding stopped. KELSEY Huber at bedside from ep lab to redress site. No bleeding, oozing or hematoma noted. Educated pt. Hold groin sites if feels need to cough or to let this RN know.   1313- left radial art line removed. Manual pressure began.  1318- pressure held for 5 min. No bleeding or drainage. Gauze and tegaderm dressing placed.   1330- pt. Is aox4. He denies pain or n/v. Pt. Bilateral groin sites remain clean dry and intact. Sites are soft. Left radial site is clean dry and intact.   1340- checked groin sites at bedside. Sites remain clean dry and intact. Left radial site checked and remains dry and intact. Gave bedside report to KELSEY Dior

## 2024-01-26 ENCOUNTER — TELEPHONE (OUTPATIENT)
Dept: CARDIOLOGY CLINIC | Age: 69
End: 2024-01-26

## 2024-01-26 NOTE — TELEPHONE ENCOUNTER
Called patient for his post procedure follow up on 1/25/2024. Patient denies any c/o at present. States no redness, swelling or drainage from bilat groins. States getting hydration and can urinate without difficulty. 1 week apt with Carmella scheduled for 1/30/2024. Patient with no other c/o. Advised to call back if any changes or questions. Patient stated understanding.

## 2024-01-30 ENCOUNTER — OFFICE VISIT (OUTPATIENT)
Dept: CARDIOLOGY CLINIC | Age: 69
End: 2024-01-30

## 2024-01-30 VITALS
HEIGHT: 70 IN | SYSTOLIC BLOOD PRESSURE: 140 MMHG | HEART RATE: 72 BPM | BODY MASS INDEX: 35.36 KG/M2 | DIASTOLIC BLOOD PRESSURE: 88 MMHG | WEIGHT: 247 LBS

## 2024-01-30 DIAGNOSIS — Z86.79 S/P ABLATION OF ATRIAL FLUTTER: Primary | ICD-10-CM

## 2024-01-30 DIAGNOSIS — D68.59 HYPERCOAGULABLE STATE (HCC): ICD-10-CM

## 2024-01-30 DIAGNOSIS — Z98.890 S/P ABLATION OF ATRIAL FLUTTER: Primary | ICD-10-CM

## 2024-01-30 DIAGNOSIS — I48.3 TYPICAL ATRIAL FLUTTER (HCC): ICD-10-CM

## 2024-01-30 DIAGNOSIS — I10 PRIMARY HYPERTENSION: ICD-10-CM

## 2024-01-30 NOTE — PROGRESS NOTES
Electrophysiology Follow up  Note      Reason for consultation: Atrial  flutter    Chief complaint: follow up on atrial flutter ablation    Referring physician:        Primary care physician: Ann Murcia DO      History of Present Illness:     This visit 1/30/2024  Patient had recent flutter ablation.  He reports he is feeling well.  He denies chest pain, palpitations, shortness of breath, lightheadedness, dizziness, edema or syncope.  He is taking his medications as prescribed.    Previous visit (12/22/2023)    Patient  is a 68-year-old male with past medical history as mentioned below.  Patient was referred by Dr. Hall as a reconsult for atrial flutter.  Patient was seen by me 5 years ago in the setting of postop A-fib from CABG.  Patient reports until recently had no issues but he has been having tiredness and shortness of breath with exertion recently and he was told that he was in irregular rhythm.  He was told he was in atrial flutter.  Patient has little symptoms.  Patient denies chest pain, dizziness or syncope.  Patient wants to consider procedure which was referred to him.  Patient is taking his medications as prescribed.    Previous visit: 3/28/2018    Chief Complaint   Patient presents with    Atrial Fibrillation     Pt here today per Dr. Hall's request, for A-fib w/RVR episode on holter monitor. Pt denies chest pain, palpitations, shortness of breath, dizziness, and edema. Patient had valve replacement. Patient denies palpitations. Tiredness and weakness which he feels post surgery,           Past medical history:   Past Medical History:   Diagnosis Date    A-fib ([facility])     Arthritis     Knees, Feet    Atrial fibrillation ([facility])     Back pain     Lower Back    CAD (coronary artery disease)     Diabetes mellitus ([facility])     H/O echocardiogram 12/14/2017    EF 50-55%. severe aortic stenosis is present, mod AR, mild MR and TR    H/O echocardiogram

## 2024-01-30 NOTE — PATIENT INSTRUCTIONS
**It is YOUR responsibilty to bring medication bottles and/or updated medication list to EACH APPOINTMENT. This will allow us to better serve you and all your healthcare needs**  Thank you for allowing us to care for you today!   We want to ensure we can follow your treatment plan and we strive to give you the best outcomes and experience possible.   If you ever have a life threatening emergency and call 911 - for an ambulance (EMS)   Our providers can only care for you at:   [facility] or [facility].   Even if you have someone take you or you drive yourself we can only care for you in a [facility] facility. Our providers are not setup at the other healthcare locations!   Thank you for allowing us to care for you today!   We want to ensure we can follow your treatment plan and we strive to give you the best outcomes and experience possible.   If you ever have a life threatening emergency and call 911 - for an ambulance (EMS)   Our providers can only care for you at:   [facility] or [facility].   Even if you have someone take you or you drive yourself we can only care for you in a [facility] facility. Our providers are not setup at the other healthcare locations!

## 2024-01-31 DIAGNOSIS — N40.1 BENIGN PROSTATIC HYPERPLASIA WITH WEAK URINARY STREAM: ICD-10-CM

## 2024-01-31 DIAGNOSIS — N40.1 BENIGN PROSTATIC HYPERPLASIA WITH WEAK URINARY STREAM: Primary | ICD-10-CM

## 2024-01-31 DIAGNOSIS — E11.9 TYPE 2 DIABETES MELLITUS WITHOUT COMPLICATION, WITHOUT LONG-TERM CURRENT USE OF INSULIN (HCC): ICD-10-CM

## 2024-01-31 DIAGNOSIS — R39.12 BENIGN PROSTATIC HYPERPLASIA WITH WEAK URINARY STREAM: ICD-10-CM

## 2024-01-31 DIAGNOSIS — E78.2 MIXED HYPERLIPIDEMIA: ICD-10-CM

## 2024-01-31 DIAGNOSIS — R39.12 BENIGN PROSTATIC HYPERPLASIA WITH WEAK URINARY STREAM: Primary | ICD-10-CM

## 2024-01-31 LAB
ANION GAP SERPL CALCULATED.3IONS-SCNC: 11 MMOL/L (ref 3–16)
BUN SERPL-MCNC: 19 MG/DL (ref 7–20)
CALCIUM SERPL-MCNC: 9.1 MG/DL (ref 8.3–10.6)
CHLORIDE SERPL-SCNC: 104 MMOL/L (ref 99–110)
CHOLEST SERPL-MCNC: 128 MG/DL (ref 0–199)
CO2 SERPL-SCNC: 24 MMOL/L (ref 21–32)
CREAT SERPL-MCNC: 0.8 MG/DL (ref 0.8–1.3)
GFR SERPLBLD CREATININE-BSD FMLA CKD-EPI: >60 ML/MIN/{1.73_M2}
GLUCOSE SERPL-MCNC: 133 MG/DL (ref 70–99)
HDLC SERPL-MCNC: 37 MG/DL (ref 40–60)
LDL CHOLESTEROL CALCULATED: 77 MG/DL
POTASSIUM SERPL-SCNC: 4.8 MMOL/L (ref 3.5–5.1)
PSA SERPL DL<=0.01 NG/ML-MCNC: 0.4 NG/ML (ref 0–4)
SODIUM SERPL-SCNC: 139 MMOL/L (ref 136–145)
TRIGL SERPL-MCNC: 72 MG/DL (ref 0–150)
VLDLC SERPL CALC-MCNC: 14 MG/DL

## 2024-02-01 LAB
EST. AVERAGE GLUCOSE BLD GHB EST-MCNC: 142.7 MG/DL
HBA1C MFR BLD: 6.6 %

## 2024-02-06 ENCOUNTER — OFFICE VISIT (OUTPATIENT)
Dept: FAMILY MEDICINE CLINIC | Age: 69
End: 2024-02-06
Payer: MEDICARE

## 2024-02-06 VITALS
HEIGHT: 70 IN | SYSTOLIC BLOOD PRESSURE: 118 MMHG | OXYGEN SATURATION: 97 % | HEART RATE: 72 BPM | WEIGHT: 246.4 LBS | BODY MASS INDEX: 35.28 KG/M2 | DIASTOLIC BLOOD PRESSURE: 72 MMHG

## 2024-02-06 DIAGNOSIS — I10 PRIMARY HYPERTENSION: ICD-10-CM

## 2024-02-06 DIAGNOSIS — E11.9 TYPE 2 DIABETES MELLITUS WITHOUT COMPLICATION, WITHOUT LONG-TERM CURRENT USE OF INSULIN (HCC): ICD-10-CM

## 2024-02-06 DIAGNOSIS — N52.9 ERECTILE DYSFUNCTION, UNSPECIFIED ERECTILE DYSFUNCTION TYPE: Primary | ICD-10-CM

## 2024-02-06 PROCEDURE — G8417 CALC BMI ABV UP PARAM F/U: HCPCS | Performed by: STUDENT IN AN ORGANIZED HEALTH CARE EDUCATION/TRAINING PROGRAM

## 2024-02-06 PROCEDURE — 99214 OFFICE O/P EST MOD 30 MIN: CPT | Performed by: STUDENT IN AN ORGANIZED HEALTH CARE EDUCATION/TRAINING PROGRAM

## 2024-02-06 PROCEDURE — 1124F ACP DISCUSS-NO DSCNMKR DOCD: CPT | Performed by: STUDENT IN AN ORGANIZED HEALTH CARE EDUCATION/TRAINING PROGRAM

## 2024-02-06 PROCEDURE — 3017F COLORECTAL CA SCREEN DOC REV: CPT | Performed by: STUDENT IN AN ORGANIZED HEALTH CARE EDUCATION/TRAINING PROGRAM

## 2024-02-06 PROCEDURE — 3078F DIAST BP <80 MM HG: CPT | Performed by: STUDENT IN AN ORGANIZED HEALTH CARE EDUCATION/TRAINING PROGRAM

## 2024-02-06 PROCEDURE — 1036F TOBACCO NON-USER: CPT | Performed by: STUDENT IN AN ORGANIZED HEALTH CARE EDUCATION/TRAINING PROGRAM

## 2024-02-06 PROCEDURE — G8427 DOCREV CUR MEDS BY ELIG CLIN: HCPCS | Performed by: STUDENT IN AN ORGANIZED HEALTH CARE EDUCATION/TRAINING PROGRAM

## 2024-02-06 PROCEDURE — 3044F HG A1C LEVEL LT 7.0%: CPT | Performed by: STUDENT IN AN ORGANIZED HEALTH CARE EDUCATION/TRAINING PROGRAM

## 2024-02-06 PROCEDURE — 3074F SYST BP LT 130 MM HG: CPT | Performed by: STUDENT IN AN ORGANIZED HEALTH CARE EDUCATION/TRAINING PROGRAM

## 2024-02-06 PROCEDURE — 2022F DILAT RTA XM EVC RTNOPTHY: CPT | Performed by: STUDENT IN AN ORGANIZED HEALTH CARE EDUCATION/TRAINING PROGRAM

## 2024-02-06 PROCEDURE — G8484 FLU IMMUNIZE NO ADMIN: HCPCS | Performed by: STUDENT IN AN ORGANIZED HEALTH CARE EDUCATION/TRAINING PROGRAM

## 2024-02-06 RX ORDER — SILDENAFIL 100 MG/1
100 TABLET, FILM COATED ORAL PRN
Qty: 20 TABLET | Refills: 0 | Status: SHIPPED | OUTPATIENT
Start: 2024-02-06

## 2024-02-06 NOTE — PROGRESS NOTES
2/12/2024    David Guillen    Chief Complaint   Patient presents with    3 Month Follow-Up     Type 2 dm        HPI  History was obtained from patient.  David is a 68 y.o. male with a PMHx as listed below who presents today for 3 month follow up. No acute complaints.     S/p ablation   S/p cabg   Follows with [facility]/EP.    Recall started on trulicity last appt. Some constipation but working on diet to improve he has to take fiber     1. Erectile dysfunction, unspecified erectile dysfunction type    2. Primary hypertension    3. Type 2 diabetes mellitus without complication, without long-term current use of insulin ([facility])             REVIEW OF SYMPTOMS    Review of Systems   Constitutional:  Negative for chills and fatigue.   HENT:  Negative for congestion and sore throat.    Respiratory:  Negative for shortness of breath and wheezing.    Cardiovascular:  Negative for chest pain and palpitations.   Gastrointestinal:  Negative for abdominal pain and nausea.   Genitourinary:  Negative for frequency and urgency.   Neurological:  Negative for light-headedness.       PAST MEDICAL HISTORY  Past Medical History:   Diagnosis Date    A-fib ([facility])     Arthritis     Knees, Feet    Atrial fibrillation ([facility])     Back pain     Lower Back    CAD (coronary artery disease)     Diabetes mellitus ([facility])     H/O echocardiogram 12/14/2017    EF 50-55%. severe aortic stenosis is present, mod AR, mild MR and TR    H/O echocardiogram 10/07/2021    EF 55-60%. Mild mitral and tricuspid regurgitation is present.    H/O transesophageal echocardiography (ADIEL) for monitoring 06/08/2023    No evidence of thrombus within the left atrial appendage.  Smoke noted in the left atrium, Mild mitral regurgitation,  There is no evidence of any pericardial effusion.    History of benign prostatic hyperplasia     History of cardiac cath 12/14/2017    Severe AS, PDA MID 80% disease, RCA 50% distal stenosis, AVR + CABG VS TAVR and PCI, CTS CONS

## 2024-02-07 ENCOUNTER — OFFICE VISIT (OUTPATIENT)
Dept: CARDIOLOGY CLINIC | Age: 69
End: 2024-02-07
Payer: MEDICARE

## 2024-02-07 VITALS
HEIGHT: 70 IN | WEIGHT: 247 LBS | HEART RATE: 76 BPM | BODY MASS INDEX: 35.36 KG/M2 | DIASTOLIC BLOOD PRESSURE: 70 MMHG | SYSTOLIC BLOOD PRESSURE: 134 MMHG

## 2024-02-07 DIAGNOSIS — Z95.2 S/P AVR (AORTIC VALVE REPLACEMENT): Primary | ICD-10-CM

## 2024-02-07 PROCEDURE — 3078F DIAST BP <80 MM HG: CPT | Performed by: INTERNAL MEDICINE

## 2024-02-07 PROCEDURE — 3017F COLORECTAL CA SCREEN DOC REV: CPT | Performed by: INTERNAL MEDICINE

## 2024-02-07 PROCEDURE — G8484 FLU IMMUNIZE NO ADMIN: HCPCS | Performed by: INTERNAL MEDICINE

## 2024-02-07 PROCEDURE — 99214 OFFICE O/P EST MOD 30 MIN: CPT | Performed by: INTERNAL MEDICINE

## 2024-02-07 PROCEDURE — 3075F SYST BP GE 130 - 139MM HG: CPT | Performed by: INTERNAL MEDICINE

## 2024-02-07 PROCEDURE — 1124F ACP DISCUSS-NO DSCNMKR DOCD: CPT | Performed by: INTERNAL MEDICINE

## 2024-02-07 PROCEDURE — G8417 CALC BMI ABV UP PARAM F/U: HCPCS | Performed by: INTERNAL MEDICINE

## 2024-02-07 PROCEDURE — 1036F TOBACCO NON-USER: CPT | Performed by: INTERNAL MEDICINE

## 2024-02-07 PROCEDURE — G8427 DOCREV CUR MEDS BY ELIG CLIN: HCPCS | Performed by: INTERNAL MEDICINE

## 2024-02-07 NOTE — PROGRESS NOTES
David  is a  Established patient  ,68 y.o.   male here for evaluation of the following chief complaint(s):    here for  fu on LHC and ablation           SUBJECTIVE/OBJECTIVE:  HPI : h/o  A flutter, cad, htn, hyperlipidimea now here  Has no cardiac compalins  Had LHC and A flutter ablation           Vitals        Vitals:     01/17/24 1426 01/17/24 1429   BP: (!) 154/92 (!) 160/86   Site: Left Upper Arm Right Upper Arm   Position: Sitting Sitting   Cuff Size: Medium Adult Medium Adult   Pulse: 88     SpO2: 96%     Weight: 114.5 kg (252 lb 6.4 oz)     Height: 1.778 m (5' 10\")                    BP (!) 160/86 (Site: Right Upper Arm, Position: Sitting, Cuff Size: Medium Adult)   Pulse 88   Ht 1.778 m (5' 10\")   Wt 114.5 kg (252 lb 6.4 oz)   SpO2 96%   BMI 36.22 kg/m²        6/9/2020     8:44 AM   Patient-Reported Vitals   Patient-Reported Weight 247lb   Patient-Reported Height 71\"   Patient-Reported Systolic 140 mmHg   Patient-Reported Diastolic 80 mmHg   Patient-Reported Pulse 64          Wt Readings from Last 3 Encounters:   01/17/24 114.5 kg (252 lb 6.4 oz)   12/22/23 113.4 kg (250 lb)   12/20/23 113.4 kg (250 lb)      Body mass index is 36.22 kg/m².     Physical Exam     Neck: JVD      Lungs : clear    Cardio : Si and S2 audilble      Ext: edema       All pertinent data reviewed       Meds : reviewed           Tests ordered         ASSESSMENT/PLAN:           - Atrial fibrillation, pt is  compliant with meds. Patient does not have symptoms from atrial fibrillation  On Eliquis we will continue  Underwent ADIEL and cardioversion  Had ablation done by Dr. Coleman on 25th of last month successful for atrial flutter      -VHD  SP  AVR  Bioprosthetic 23 mm Medtronic Mosaic valve placed in December 2017  DC caggeine ad ETOH        -     CORONARY ARTERY DISEASE:  asymptomatic     All available  tests in chart reviewed. Management discussed .  Testing ordered  no              Aspirin stable we will continue      Had SVG to

## 2024-02-09 ENCOUNTER — ENROLLMENT (OUTPATIENT)
Dept: PHARMACY | Facility: CLINIC | Age: 69
End: 2024-02-09

## 2024-02-09 PROBLEM — D68.59 HYPERCOAGULABLE STATE (HCC): Status: ACTIVE | Noted: 2024-02-09

## 2024-02-29 ENCOUNTER — OFFICE VISIT (OUTPATIENT)
Dept: CARDIOLOGY CLINIC | Age: 69
End: 2024-02-29
Payer: MEDICARE

## 2024-02-29 VITALS
HEART RATE: 66 BPM | HEIGHT: 70 IN | SYSTOLIC BLOOD PRESSURE: 116 MMHG | DIASTOLIC BLOOD PRESSURE: 68 MMHG | WEIGHT: 246.4 LBS | BODY MASS INDEX: 35.28 KG/M2

## 2024-02-29 DIAGNOSIS — I48.0 PAROXYSMAL ATRIAL FIBRILLATION (HCC): ICD-10-CM

## 2024-02-29 DIAGNOSIS — D68.69 HYPERCOAGULABLE STATE DUE TO PAROXYSMAL ATRIAL FIBRILLATION (HCC): ICD-10-CM

## 2024-02-29 DIAGNOSIS — Z98.890 S/P ABLATION OF ATRIAL FLUTTER: Primary | ICD-10-CM

## 2024-02-29 DIAGNOSIS — I48.0 HYPERCOAGULABLE STATE DUE TO PAROXYSMAL ATRIAL FIBRILLATION (HCC): ICD-10-CM

## 2024-02-29 DIAGNOSIS — I10 PRIMARY HYPERTENSION: ICD-10-CM

## 2024-02-29 DIAGNOSIS — Z86.79 S/P ABLATION OF ATRIAL FLUTTER: Primary | ICD-10-CM

## 2024-02-29 PROCEDURE — 1036F TOBACCO NON-USER: CPT | Performed by: NURSE PRACTITIONER

## 2024-02-29 PROCEDURE — G8417 CALC BMI ABV UP PARAM F/U: HCPCS | Performed by: NURSE PRACTITIONER

## 2024-02-29 PROCEDURE — 99214 OFFICE O/P EST MOD 30 MIN: CPT | Performed by: NURSE PRACTITIONER

## 2024-02-29 PROCEDURE — 3074F SYST BP LT 130 MM HG: CPT | Performed by: NURSE PRACTITIONER

## 2024-02-29 PROCEDURE — 93000 ELECTROCARDIOGRAM COMPLETE: CPT | Performed by: NURSE PRACTITIONER

## 2024-02-29 PROCEDURE — 1124F ACP DISCUSS-NO DSCNMKR DOCD: CPT | Performed by: NURSE PRACTITIONER

## 2024-02-29 PROCEDURE — G8427 DOCREV CUR MEDS BY ELIG CLIN: HCPCS | Performed by: NURSE PRACTITIONER

## 2024-02-29 PROCEDURE — 3078F DIAST BP <80 MM HG: CPT | Performed by: NURSE PRACTITIONER

## 2024-02-29 PROCEDURE — 3017F COLORECTAL CA SCREEN DOC REV: CPT | Performed by: NURSE PRACTITIONER

## 2024-02-29 PROCEDURE — G8484 FLU IMMUNIZE NO ADMIN: HCPCS | Performed by: NURSE PRACTITIONER

## 2024-02-29 ASSESSMENT — ENCOUNTER SYMPTOMS
NAUSEA: 0
BACK PAIN: 0
SHORTNESS OF BREATH: 0
BLOOD IN STOOL: 0
COLOR CHANGE: 0
ABDOMINAL PAIN: 0
PHOTOPHOBIA: 0
SINUS PRESSURE: 0
SINUS PAIN: 0
COUGH: 0
ABDOMINAL DISTENTION: 0

## 2024-02-29 NOTE — PATIENT INSTRUCTIONS
Please be informed that if you contact our office outside of normal business hours the physician on call cannot help with any scheduling or rescheduling issues, procedure instruction questions or any type of medication issue.    We advise you for any urgent/emergency that you go to the nearest emergency room!    PLEASE CALL OUR OFFICE DURING NORMAL BUSINESS HOURS    Monday - Friday   8 am to 5 pm    [place]: [phone]    [place]: [phone]    [place]:  [phone]    **It is YOUR responsibilty to bring medication bottles and/or updated medication list to EACH APPOINTMENT. This will allow us to better serve you and all your healthcare needs**    Thank you for allowing us to care for you today!   We want to ensure we can follow your treatment plan and we strive to give you the best outcomes and experience possible.   If you ever have a life threatening emergency and call 911 - for an ambulance (EMS)   Our providers can only care for you at:   [facility] or [facility].   Even if you have someone take you or you drive yourself we can only care for you in a [facility] facility. Our providers are not setup at the other healthcare locations!

## 2024-02-29 NOTE — PROGRESS NOTES
Electrophysiology Follow up  Note      Reason for consultation: Atrial  flutter    Chief complaint: follow up on atrial flutter ablation    Referring physician:        Primary care physician: Ann Murcia DO      History of Present Illness:     This visit 2/29/2024  Patient is here today for 1 month follow-up status post ablation of atrial flutter.  Patient reports he is feeling well.  He denies chest pain, palpitations, shortness of breath, lightheadedness, dizziness, edema or syncope    Previous visit 1/30/2024  Patient had recent flutter ablation.  He reports he is feeling well.  He denies chest pain, palpitations, shortness of breath, lightheadedness, dizziness, edema or syncope.  He is taking his medications as prescribed.    Previous visit (12/22/2023)    Patient  is a 68-year-old male with past medical history as mentioned below.  Patient was referred by Dr. Hall as a reconsult for atrial flutter.  Patient was seen by me 5 years ago in the setting of postop A-fib from CABG.  Patient reports until recently had no issues but he has been having tiredness and shortness of breath with exertion recently and he was told that he was in irregular rhythm.  He was told he was in atrial flutter.  Patient has little symptoms.  Patient denies chest pain, dizziness or syncope.  Patient wants to consider procedure which was referred to him.  Patient is taking his medications as prescribed.    Previous visit: 3/28/2018    Chief Complaint   Patient presents with    Atrial Fibrillation     Pt here today per Dr. Hall's request, for A-fib w/RVR episode on holter monitor. Pt denies chest pain, palpitations, shortness of breath, dizziness, and edema. Patient had valve replacement. Patient denies palpitations. Tiredness and weakness which he feels post surgery,           Past medical history:   Past Medical History:   Diagnosis Date    A-fib ([facility])     Arthritis     Knees,

## 2024-03-12 DIAGNOSIS — Z95.2 S/P AVR (AORTIC VALVE REPLACEMENT): ICD-10-CM

## 2024-03-14 RX ORDER — CARVEDILOL 25 MG/1
TABLET ORAL
Qty: 180 TABLET | Refills: 1 | Status: SHIPPED | OUTPATIENT
Start: 2024-03-14

## 2024-04-06 DIAGNOSIS — E11.9 TYPE 2 DIABETES MELLITUS WITHOUT COMPLICATION, WITHOUT LONG-TERM CURRENT USE OF INSULIN (HCC): ICD-10-CM

## 2024-04-08 RX ORDER — DULAGLUTIDE 0.75 MG/.5ML
INJECTION, SOLUTION SUBCUTANEOUS
Qty: 5 ADJUSTABLE DOSE PRE-FILLED PEN SYRINGE | Refills: 2 | Status: SHIPPED | OUTPATIENT
Start: 2024-04-08

## 2024-05-03 ENCOUNTER — OFFICE VISIT (OUTPATIENT)
Dept: CARDIOLOGY CLINIC | Age: 69
End: 2024-05-03

## 2024-05-03 VITALS
HEART RATE: 77 BPM | WEIGHT: 245.2 LBS | HEIGHT: 70 IN | SYSTOLIC BLOOD PRESSURE: 130 MMHG | BODY MASS INDEX: 35.1 KG/M2 | OXYGEN SATURATION: 97 % | DIASTOLIC BLOOD PRESSURE: 78 MMHG

## 2024-05-03 DIAGNOSIS — I10 PRIMARY HYPERTENSION: ICD-10-CM

## 2024-05-03 DIAGNOSIS — Z86.79 HISTORY OF ATRIAL FIBRILLATION: ICD-10-CM

## 2024-05-03 DIAGNOSIS — Z98.890 S/P ABLATION OF ATRIAL FLUTTER: Primary | ICD-10-CM

## 2024-05-03 DIAGNOSIS — Z86.79 S/P ABLATION OF ATRIAL FLUTTER: Primary | ICD-10-CM

## 2024-05-03 DIAGNOSIS — R00.2 PALPITATIONS: ICD-10-CM

## 2024-05-03 ASSESSMENT — ENCOUNTER SYMPTOMS
SINUS PAIN: 0
SHORTNESS OF BREATH: 0
ABDOMINAL DISTENTION: 0
COUGH: 0
BLOOD IN STOOL: 0
ABDOMINAL PAIN: 0
NAUSEA: 0
COLOR CHANGE: 0
PHOTOPHOBIA: 0
SINUS PRESSURE: 0
BACK PAIN: 0

## 2024-05-23 ENCOUNTER — TELEPHONE (OUTPATIENT)
Dept: CARDIOLOGY CLINIC | Age: 69
End: 2024-05-23

## 2024-05-23 NOTE — TELEPHONE ENCOUNTER
Cardiologist: Dr. Hall  Surgeon: Dr. Moreno  Surgery: Right total knee arthroplasty  Surgery/Procedure should be done in the hospital setting    _____ yes    _____  no    Anesthesia: Spinal  Date: 7/1/2024  Fax# [phone]  # [phone]    Last OV 5/3/2024 w/Carmella    S/P ablation of atrial flutter  Atrial flutter Appears typical  PAF post cabg but no recurrence since then until now with flutter  HTN      Last EKG- 5/3/2024      Stress Test- 4/19/2018  Normal exercise performance without angina and ischemic EKG changes.         Echo- 11/3/2022   Left ventricular function is normal, EF 55-60%.   Bilateral atrial dilatation.   The bioprosthetic valve in the aortic position (23 mm Medtronic Mosaic valve   inserted 12/15/2017) with a mean pressure gradient of 22 mmHg, and an   acceleration time of 74 msec.   Mild perivalvular leak.   Mild mitral stenosis with a mean gradient of about 3 mmHg, with a MVA of   2.18 cm^2.   Moderate mitral and mild-to-moderate tricuspid regurgitation is present.   Mitral annular calcification is present.   Mildly elevated pulmonary artery pressure, RVSP 35 mmHg.   No evidence of pericardial effusion.      Cath- 1/23/2024  The left main is a large tubular vessel divides into left and descending artery which is multiple areas of calcification however no critical stenosis is noted essentially unchanged from the previous cath diagonal is a large-caliber vessel  Circumflex is a nondominant vessel no significant stenosis  The right coronary artery is a large-caliber vessel the PDA is filling from competitive flow from the saphenous vein graft appears to have 80% stenosis in the PDA  The saphenous vein graft to the PDA is widely patent      Aortic valve replacement with cabg -12/2017      Ablation of dysrhythmic focus -1/25/2024      Eliquis    Aspirin

## 2024-06-03 DIAGNOSIS — E11.9 TYPE 2 DIABETES MELLITUS WITHOUT COMPLICATION, WITHOUT LONG-TERM CURRENT USE OF INSULIN (HCC): ICD-10-CM

## 2024-06-03 DIAGNOSIS — R39.12 BENIGN PROSTATIC HYPERPLASIA WITH WEAK URINARY STREAM: Chronic | ICD-10-CM

## 2024-06-03 DIAGNOSIS — I48.3 TYPICAL ATRIAL FLUTTER (HCC): Primary | ICD-10-CM

## 2024-06-03 DIAGNOSIS — N40.1 BENIGN PROSTATIC HYPERPLASIA WITH WEAK URINARY STREAM: Chronic | ICD-10-CM

## 2024-06-03 DIAGNOSIS — E78.2 MODERATE MIXED HYPERLIPIDEMIA NOT REQUIRING STATIN THERAPY: ICD-10-CM

## 2024-06-03 DIAGNOSIS — I25.10 CORONARY ARTERY DISEASE INVOLVING NATIVE CORONARY ARTERY OF NATIVE HEART WITHOUT ANGINA PECTORIS: ICD-10-CM

## 2024-06-03 LAB
ALBUMIN SERPL-MCNC: 4.2 G/DL (ref 3.4–5)
ALBUMIN/GLOB SERPL: 1.6 {RATIO} (ref 1.1–2.2)
ALP SERPL-CCNC: 55 U/L (ref 40–129)
ALT SERPL-CCNC: 26 U/L (ref 10–40)
ANION GAP SERPL CALCULATED.3IONS-SCNC: 12 MMOL/L (ref 3–16)
AST SERPL-CCNC: 21 U/L (ref 15–37)
BILIRUB SERPL-MCNC: 0.6 MG/DL (ref 0–1)
BUN SERPL-MCNC: 20 MG/DL (ref 7–20)
CALCIUM SERPL-MCNC: 9.3 MG/DL (ref 8.3–10.6)
CHLORIDE SERPL-SCNC: 105 MMOL/L (ref 99–110)
CHOLEST SERPL-MCNC: 131 MG/DL (ref 0–199)
CO2 SERPL-SCNC: 22 MMOL/L (ref 21–32)
CREAT SERPL-MCNC: 0.9 MG/DL (ref 0.8–1.3)
GFR SERPLBLD CREATININE-BSD FMLA CKD-EPI: >90 ML/MIN/{1.73_M2}
GLUCOSE SERPL-MCNC: 133 MG/DL (ref 70–99)
HDLC SERPL-MCNC: 41 MG/DL (ref 40–60)
LDL CHOLESTEROL: 75 MG/DL
MAGNESIUM SERPL-MCNC: 2 MG/DL (ref 1.8–2.4)
POTASSIUM SERPL-SCNC: 4.5 MMOL/L (ref 3.5–5.1)
PROT SERPL-MCNC: 6.9 G/DL (ref 6.4–8.2)
SODIUM SERPL-SCNC: 139 MMOL/L (ref 136–145)
TRIGL SERPL-MCNC: 76 MG/DL (ref 0–150)
VLDLC SERPL CALC-MCNC: 15 MG/DL

## 2024-06-03 SDOH — ECONOMIC STABILITY: FOOD INSECURITY: WITHIN THE PAST 12 MONTHS, THE FOOD YOU BOUGHT JUST DIDN'T LAST AND YOU DIDN'T HAVE MONEY TO GET MORE.: NEVER TRUE

## 2024-06-03 SDOH — ECONOMIC STABILITY: TRANSPORTATION INSECURITY
IN THE PAST 12 MONTHS, HAS LACK OF TRANSPORTATION KEPT YOU FROM MEETINGS, WORK, OR FROM GETTING THINGS NEEDED FOR DAILY LIVING?: NO

## 2024-06-03 SDOH — ECONOMIC STABILITY: HOUSING INSECURITY
IN THE LAST 12 MONTHS, WAS THERE A TIME WHEN YOU DID NOT HAVE A STEADY PLACE TO SLEEP OR SLEPT IN A SHELTER (INCLUDING NOW)?: NO

## 2024-06-03 SDOH — ECONOMIC STABILITY: INCOME INSECURITY: HOW HARD IS IT FOR YOU TO PAY FOR THE VERY BASICS LIKE FOOD, HOUSING, MEDICAL CARE, AND HEATING?: NOT VERY HARD

## 2024-06-03 SDOH — ECONOMIC STABILITY: FOOD INSECURITY: WITHIN THE PAST 12 MONTHS, YOU WORRIED THAT YOUR FOOD WOULD RUN OUT BEFORE YOU GOT MONEY TO BUY MORE.: NEVER TRUE

## 2024-06-04 LAB
EST. AVERAGE GLUCOSE BLD GHB EST-MCNC: 139.9 MG/DL
HBA1C MFR BLD: 6.5 %

## 2024-06-06 ENCOUNTER — OFFICE VISIT (OUTPATIENT)
Dept: FAMILY MEDICINE CLINIC | Age: 69
End: 2024-06-06
Payer: MEDICARE

## 2024-06-06 VITALS
DIASTOLIC BLOOD PRESSURE: 88 MMHG | OXYGEN SATURATION: 97 % | BODY MASS INDEX: 33.93 KG/M2 | HEART RATE: 73 BPM | HEIGHT: 70 IN | SYSTOLIC BLOOD PRESSURE: 140 MMHG | WEIGHT: 237 LBS

## 2024-06-06 DIAGNOSIS — K59.03 DRUG-INDUCED CONSTIPATION: ICD-10-CM

## 2024-06-06 DIAGNOSIS — E11.9 TYPE 2 DIABETES MELLITUS WITHOUT COMPLICATION, WITHOUT LONG-TERM CURRENT USE OF INSULIN (HCC): ICD-10-CM

## 2024-06-06 DIAGNOSIS — Z91.81 AT HIGH RISK FOR FALLS: ICD-10-CM

## 2024-06-06 DIAGNOSIS — E78.2 MIXED HYPERLIPIDEMIA: Primary | ICD-10-CM

## 2024-06-06 PROCEDURE — 3077F SYST BP >= 140 MM HG: CPT | Performed by: STUDENT IN AN ORGANIZED HEALTH CARE EDUCATION/TRAINING PROGRAM

## 2024-06-06 PROCEDURE — 99214 OFFICE O/P EST MOD 30 MIN: CPT | Performed by: STUDENT IN AN ORGANIZED HEALTH CARE EDUCATION/TRAINING PROGRAM

## 2024-06-06 PROCEDURE — 3079F DIAST BP 80-89 MM HG: CPT | Performed by: STUDENT IN AN ORGANIZED HEALTH CARE EDUCATION/TRAINING PROGRAM

## 2024-06-06 PROCEDURE — 3044F HG A1C LEVEL LT 7.0%: CPT | Performed by: STUDENT IN AN ORGANIZED HEALTH CARE EDUCATION/TRAINING PROGRAM

## 2024-06-06 PROCEDURE — 1124F ACP DISCUSS-NO DSCNMKR DOCD: CPT | Performed by: STUDENT IN AN ORGANIZED HEALTH CARE EDUCATION/TRAINING PROGRAM

## 2024-06-06 SDOH — ECONOMIC STABILITY: FOOD INSECURITY: WITHIN THE PAST 12 MONTHS, THE FOOD YOU BOUGHT JUST DIDN'T LAST AND YOU DIDN'T HAVE MONEY TO GET MORE.: NEVER TRUE

## 2024-06-06 SDOH — ECONOMIC STABILITY: FOOD INSECURITY: WITHIN THE PAST 12 MONTHS, YOU WORRIED THAT YOUR FOOD WOULD RUN OUT BEFORE YOU GOT MONEY TO BUY MORE.: NEVER TRUE

## 2024-06-06 SDOH — ECONOMIC STABILITY: INCOME INSECURITY: HOW HARD IS IT FOR YOU TO PAY FOR THE VERY BASICS LIKE FOOD, HOUSING, MEDICAL CARE, AND HEATING?: NOT HARD AT ALL

## 2024-06-06 NOTE — PROGRESS NOTES
Visual inspection:  Deformity/amputation: absent  Skin lesions/pre-ulcerative calluses: absent  Edema: right- negative, left- negative    Sensory exam:  Monofilament sensation: normal  (minimum of 5 random plantar locations tested, avoiding callused areas - > 1 area with absence of sensation is + for neuropathy)    Plus at least one of the following:  Pulses: abnormal - poorly palpable      
Hemoglobin A1C; Future  - Microalbumin / Creatinine Urine Ratio; Future    2. Drug-induced constipation    - magnesium hydroxide (MILK OF MAGNESIA) 400 MG/5ML suspension; Take 15 mLs by mouth daily as needed for Constipation (Patient not taking: Reported on 6/14/2024)  Dispense: 118 mL; Refill: 0    3. Mixed hyperlipidemia    - CBC with Auto Differential; Future  - Comprehensive Metabolic Panel; Future  - Lipid, Fasting; Future    4. At high risk for falls    Continue current regimen  F/u labs  DM2 excellent control    Hemoglobin A1C   Date Value Ref Range Status   06/03/2024 6.5 See comment % Final     Comment:     Comment:  Diagnosis of Diabetes: > or = 6.5%  Increased risk of diabetes (Prediabetes): 5.7-6.4%  Glycemic Control: Nonpregnant Adults: <7.0%                    Pregnant: <6.0%             Return in about 6 months (around 12/6/2024).         Electronically signed by Ann Murcia DO on 6/16/2024            On the basis of positive falls risk screening, assessment and plan is as follows: .

## 2024-06-14 ENCOUNTER — OFFICE VISIT (OUTPATIENT)
Dept: CARDIOLOGY CLINIC | Age: 69
End: 2024-06-14
Payer: MEDICARE

## 2024-06-14 VITALS
BODY MASS INDEX: 34.22 KG/M2 | DIASTOLIC BLOOD PRESSURE: 66 MMHG | WEIGHT: 239 LBS | HEIGHT: 70 IN | OXYGEN SATURATION: 97 % | HEART RATE: 76 BPM | SYSTOLIC BLOOD PRESSURE: 128 MMHG

## 2024-06-14 DIAGNOSIS — I10 PRIMARY HYPERTENSION: ICD-10-CM

## 2024-06-14 DIAGNOSIS — Z86.79 S/P ABLATION OF ATRIAL FLUTTER: Primary | ICD-10-CM

## 2024-06-14 DIAGNOSIS — D68.59 HYPERCOAGULABLE STATE (HCC): ICD-10-CM

## 2024-06-14 DIAGNOSIS — Z98.890 S/P ABLATION OF ATRIAL FLUTTER: Primary | ICD-10-CM

## 2024-06-14 PROCEDURE — 1124F ACP DISCUSS-NO DSCNMKR DOCD: CPT | Performed by: NURSE PRACTITIONER

## 2024-06-14 PROCEDURE — 99214 OFFICE O/P EST MOD 30 MIN: CPT | Performed by: NURSE PRACTITIONER

## 2024-06-14 PROCEDURE — 93000 ELECTROCARDIOGRAM COMPLETE: CPT | Performed by: NURSE PRACTITIONER

## 2024-06-14 PROCEDURE — 3078F DIAST BP <80 MM HG: CPT | Performed by: NURSE PRACTITIONER

## 2024-06-14 PROCEDURE — 3074F SYST BP LT 130 MM HG: CPT | Performed by: NURSE PRACTITIONER

## 2024-06-14 ASSESSMENT — ENCOUNTER SYMPTOMS
ABDOMINAL DISTENTION: 0
NAUSEA: 0
SINUS PAIN: 0
BACK PAIN: 0
COUGH: 0
BLOOD IN STOOL: 0
SHORTNESS OF BREATH: 0
ABDOMINAL PAIN: 0
SINUS PRESSURE: 0
PHOTOPHOBIA: 0
COLOR CHANGE: 0

## 2024-06-14 NOTE — PROGRESS NOTES
Date/Time    WBC 10.8 01/18/2024 09:29 AM    HGB 14.3 01/25/2024 11:29 AM    HCT 42.0 01/25/2024 11:29 AM     01/18/2024 09:29 AM     Lipids:   Lab Results   Component Value Date    CHOL 122 01/03/2023    TRIG 83 01/03/2023    HDL 41 06/03/2024    LDLDIRECT 87 04/17/2018     PT/INR:   Lab Results   Component Value Date/Time    INR 1.35 03/03/2021 10:39 AM        BMP:    Lab Results   Component Value Date     06/03/2024    K 4.5 06/03/2024     06/03/2024    CO2 22 06/03/2024    BUN 20 06/03/2024     CMP:   Lab Results   Component Value Date    AST 40 (H) 12/18/2017    PROT 6.0 (L) 12/18/2017    BILITOT 0.8 12/18/2017    ALKPHOS 60 12/18/2017     TSH:    Lab Results   Component Value Date/Time    TSH 5.26 08/30/2019 08:40 AM       EKG Interpretation:  sinus rhythm      IMPRESSION / RECOMMENDATIONS:     S/P ablation of atrial flutter  Atrial flutter Appears typical  Hypercoagulable state  PAF post cabg but no recurrence since then until now with flutter  HTN    Patient is  follow-up status post ablation of atrial flutter  He reports that he is feeling well.  He denies chest pain, palpitations, shortness of breath, lightheadedness, dizziness, edema or syncope  Patient had an event monitor placed  Patient had 1 short episode of atrial tachycardia  Discussed with patient that we should continue aspirin 81 mg daily and start Plavix 75 mg daily  Patient is worried about blood clots and states that he is going to be having knee surgery  Patient would like to continue with Eliquis at this time until after the knee surgery  We will follow with patient 1 August  We will continue carvedilol 25 mg twice daily    Patient had afib 5 years after CABG  No further episodes documented since.   EP study reveals atrial flutter, no afib.     Vitals:    06/14/24 1134   BP: 128/66   Pulse: 76   SpO2: 97%   Continue coreg      Patient to follow up in 2 month or sooner if needed.     Carmella Rodriguez, APRN - CNP

## 2024-06-19 ENCOUNTER — TELEPHONE (OUTPATIENT)
Dept: CARDIOLOGY CLINIC | Age: 69
End: 2024-06-19

## 2024-06-19 NOTE — TELEPHONE ENCOUNTER
Pt called to see what the charge was for the bill of 23.--. This as the copay per the insurance company.

## 2024-06-21 DIAGNOSIS — E11.9 TYPE 2 DIABETES MELLITUS WITHOUT COMPLICATION, WITHOUT LONG-TERM CURRENT USE OF INSULIN (HCC): ICD-10-CM

## 2024-06-21 RX ORDER — DULAGLUTIDE 0.75 MG/.5ML
INJECTION, SOLUTION SUBCUTANEOUS
Qty: 2 ML | Refills: 2 | Status: SHIPPED | OUTPATIENT
Start: 2024-06-21

## 2024-06-27 RX ORDER — ASPIRIN 81 MG/1
81 TABLET, COATED ORAL DAILY
Qty: 90 TABLET | Refills: 1 | Status: SHIPPED | OUTPATIENT
Start: 2024-06-27

## 2024-07-06 DIAGNOSIS — E11.9 TYPE 2 DIABETES MELLITUS WITHOUT COMPLICATION, WITHOUT LONG-TERM CURRENT USE OF INSULIN (HCC): ICD-10-CM

## 2024-07-08 RX ORDER — METFORMIN HYDROCHLORIDE 500 MG/1
2000 TABLET, EXTENDED RELEASE ORAL
Qty: 360 TABLET | Refills: 1 | Status: SHIPPED | OUTPATIENT
Start: 2024-07-08

## 2024-08-07 ENCOUNTER — OFFICE VISIT (OUTPATIENT)
Dept: CARDIOLOGY CLINIC | Age: 69
End: 2024-08-07
Payer: MEDICARE

## 2024-08-07 VITALS
HEIGHT: 70 IN | BODY MASS INDEX: 33.39 KG/M2 | DIASTOLIC BLOOD PRESSURE: 70 MMHG | SYSTOLIC BLOOD PRESSURE: 122 MMHG | WEIGHT: 233.2 LBS | HEART RATE: 75 BPM

## 2024-08-07 DIAGNOSIS — Z86.79 S/P ABLATION OF ATRIAL FLUTTER: Primary | ICD-10-CM

## 2024-08-07 DIAGNOSIS — I10 PRIMARY HYPERTENSION: ICD-10-CM

## 2024-08-07 DIAGNOSIS — Z98.890 S/P ABLATION OF ATRIAL FLUTTER: Primary | ICD-10-CM

## 2024-08-07 DIAGNOSIS — I48.3 TYPICAL ATRIAL FLUTTER (HCC): ICD-10-CM

## 2024-08-07 DIAGNOSIS — D68.59 HYPERCOAGULABLE STATE (HCC): ICD-10-CM

## 2024-08-07 PROCEDURE — 1124F ACP DISCUSS-NO DSCNMKR DOCD: CPT | Performed by: NURSE PRACTITIONER

## 2024-08-07 PROCEDURE — 3074F SYST BP LT 130 MM HG: CPT | Performed by: NURSE PRACTITIONER

## 2024-08-07 PROCEDURE — 99214 OFFICE O/P EST MOD 30 MIN: CPT | Performed by: NURSE PRACTITIONER

## 2024-08-07 PROCEDURE — 93000 ELECTROCARDIOGRAM COMPLETE: CPT | Performed by: NURSE PRACTITIONER

## 2024-08-07 PROCEDURE — 3078F DIAST BP <80 MM HG: CPT | Performed by: NURSE PRACTITIONER

## 2024-08-07 RX ORDER — ATORVASTATIN CALCIUM 10 MG/1
TABLET, FILM COATED ORAL
Qty: 90 TABLET | Refills: 1 | Status: SHIPPED | OUTPATIENT
Start: 2024-08-07

## 2024-08-07 ASSESSMENT — ENCOUNTER SYMPTOMS
NAUSEA: 0
BLOOD IN STOOL: 0
ABDOMINAL DISTENTION: 0
SINUS PAIN: 0
PHOTOPHOBIA: 0
COUGH: 0
SINUS PRESSURE: 0
ABDOMINAL PAIN: 0
BACK PAIN: 0
SHORTNESS OF BREATH: 0
COLOR CHANGE: 0

## 2024-08-07 NOTE — PROGRESS NOTES
Negative for cold intolerance and heat intolerance.   Genitourinary:  Negative for difficulty urinating, dysuria and hematuria.   Musculoskeletal:  Positive for arthralgias. Negative for back pain.   Skin:  Negative for color change and rash.   Allergic/Immunologic: Negative for food allergies.   Neurological:  Negative for dizziness, light-headedness and headaches.   Hematological:  Does not bruise/bleed easily.   Psychiatric/Behavioral:  Negative for agitation and confusion. The patient is not nervous/anxious.      Physical Examination:    /70 (Site: Left Upper Arm, Position: Sitting, Cuff Size: Small Adult)   Pulse 75   Ht 1.778 m (5' 10\")   Wt 105.8 kg (233 lb 3.2 oz)   BMI 33.46 kg/m²    Wt Readings from Last 3 Encounters:   08/07/24 105.8 kg (233 lb 3.2 oz)   06/14/24 108.4 kg (239 lb)   06/06/24 107.5 kg (237 lb)     Body mass index is 33.46 kg/m².      Physical Exam  Constitutional:       Appearance: Normal appearance.   HENT:      Head: Normocephalic and atraumatic.      Right Ear: External ear normal.      Left Ear: External ear normal.      Nose: Nose normal.      Mouth/Throat:      Pharynx: Oropharynx is clear.   Eyes:      General:         Right eye: No discharge.         Left eye: No discharge.      Conjunctiva/sclera: Conjunctivae normal.   Neck:      Thyroid: No thyromegaly.      Vascular: No JVD.   Cardiovascular:      Rate and Rhythm: Normal rate and regular rhythm.      Heart sounds: Murmur (grade 2/6 systolic murmur) heard.      No friction rub.   Pulmonary:      Breath sounds: Normal breath sounds. No wheezing or rhonchi.   Abdominal:      General: Bowel sounds are normal.      Palpations: Abdomen is soft.   Musculoskeletal:         General: Normal range of motion.      Cervical back: Normal range of motion and neck supple.      Right lower leg: No edema.      Left lower leg: No edema.   Skin:     General: Skin is warm and dry.   Neurological:      Mental Status: He is alert and

## 2024-09-05 DIAGNOSIS — Z95.2 S/P AVR (AORTIC VALVE REPLACEMENT): ICD-10-CM

## 2024-09-05 RX ORDER — CARVEDILOL 25 MG/1
TABLET ORAL
Qty: 180 TABLET | Refills: 1 | Status: SHIPPED | OUTPATIENT
Start: 2024-09-05

## 2024-09-17 DIAGNOSIS — B37.2 CANDIDA INFECTION OF FLEXURAL SKIN: ICD-10-CM

## 2024-09-17 DIAGNOSIS — L20.82 FLEXURAL ECZEMA: ICD-10-CM

## 2024-09-18 DIAGNOSIS — N52.9 ERECTILE DYSFUNCTION, UNSPECIFIED ERECTILE DYSFUNCTION TYPE: ICD-10-CM

## 2024-09-18 RX ORDER — CLOTRIMAZOLE AND BETAMETHASONE DIPROPIONATE 10; .64 MG/G; MG/G
CREAM TOPICAL
Qty: 45 G | Refills: 2 | Status: SHIPPED | OUTPATIENT
Start: 2024-09-18

## 2024-09-18 RX ORDER — HYDROCORTISONE 2.5 %
CREAM (GRAM) TOPICAL
Qty: 1 EACH | Refills: 1 | Status: SHIPPED | OUTPATIENT
Start: 2024-09-18

## 2024-09-18 RX ORDER — SILDENAFIL 100 MG/1
100 TABLET, FILM COATED ORAL PRN
Qty: 9 TABLET | Refills: 2 | Status: SHIPPED | OUTPATIENT
Start: 2024-09-18

## 2024-09-24 RX ORDER — ASPIRIN 81 MG/1
81 TABLET ORAL DAILY
Qty: 90 TABLET | Refills: 1 | Status: SHIPPED | OUTPATIENT
Start: 2024-09-24

## 2024-10-02 DIAGNOSIS — E11.9 TYPE 2 DIABETES MELLITUS WITHOUT COMPLICATION, WITHOUT LONG-TERM CURRENT USE OF INSULIN (HCC): ICD-10-CM

## 2024-10-03 RX ORDER — DULAGLUTIDE 0.75 MG/.5ML
INJECTION, SOLUTION SUBCUTANEOUS
Qty: 2 ML | Refills: 2 | Status: SHIPPED | OUTPATIENT
Start: 2024-10-03

## 2024-11-07 ENCOUNTER — OFFICE VISIT (OUTPATIENT)
Dept: CARDIOLOGY CLINIC | Age: 69
End: 2024-11-07

## 2024-11-07 VITALS
SYSTOLIC BLOOD PRESSURE: 118 MMHG | BODY MASS INDEX: 33.87 KG/M2 | HEIGHT: 70 IN | WEIGHT: 236.6 LBS | DIASTOLIC BLOOD PRESSURE: 64 MMHG | HEART RATE: 88 BPM

## 2024-11-07 DIAGNOSIS — I38 VHD (VALVULAR HEART DISEASE): Primary | ICD-10-CM

## 2024-11-07 DIAGNOSIS — I25.10 CORONARY ARTERY DISEASE INVOLVING NATIVE CORONARY ARTERY OF NATIVE HEART WITHOUT ANGINA PECTORIS: ICD-10-CM

## 2024-11-07 DIAGNOSIS — E78.2 MIXED HYPERLIPIDEMIA: ICD-10-CM

## 2024-11-07 DIAGNOSIS — R42 DIZZINESS: ICD-10-CM

## 2024-11-07 ASSESSMENT — ENCOUNTER SYMPTOMS
SHORTNESS OF BREATH: 1
ORTHOPNEA: 0

## 2024-11-07 NOTE — PROGRESS NOTES
11/7/2024  Primary cardiologist: Dr. Hall    CC:   David  is an established 69 y.o.  male here for a follow up on cad       SUBJECTIVE/OBJECTIVE:  David is a 69 y.o. male with a history of atrial flutter, hypertension, hyperlipidemia, valvular heart disease s/p bioprosthetic  AVR ( 2017), coronary artery disease s/p CABG ( SVG to PDA-2017) and diabetes mellitus    HPI:  David reports that since last visiting he had a right total knee replacement. He completed therapy. He is walking about 1 hour in the morning and evening.  With his activity has no chest pain or shortness of breath.  He does note episodes of dizziness.  Can occur with bending over or sometimes just with walking his dogs.  Has noted some mild shortness of breath with exertion however is able to complete ADLs.    Review of Systems   Constitutional: Negative for diaphoresis and malaise/fatigue.   Cardiovascular:  Negative for chest pain, claudication, dyspnea on exertion, irregular heartbeat, leg swelling, near-syncope, orthopnea, palpitations and paroxysmal nocturnal dyspnea.   Respiratory:  Positive for shortness of breath.    Neurological:  Negative for dizziness and light-headedness.        Balance disturbance         Vitals:    11/07/24 0919   BP: 118/64   Site: Left Upper Arm   Position: Sitting   Cuff Size: Medium Adult   Pulse: 88   Weight: 107.3 kg (236 lb 9.6 oz)   Height: 1.778 m (5' 10\")     Wt Readings from Last 3 Encounters:   11/07/24 107.3 kg (236 lb 9.6 oz)   08/07/24 105.8 kg (233 lb 3.2 oz)   06/14/24 108.4 kg (239 lb)      Body mass index is 33.95 kg/m².     Physical Exam  Vitals reviewed.   Eyes:      Pupils: Pupils are equal, round, and reactive to light.   Neck:      Vascular: No carotid bruit.   Cardiovascular:      Rate and Rhythm: Normal rate and regular rhythm.      Pulses: Normal pulses.      Heart sounds: Murmur heard.   Pulmonary:      Effort: Pulmonary effort is normal.      Breath sounds: Normal breath sounds.

## 2024-12-01 DIAGNOSIS — E11.9 TYPE 2 DIABETES MELLITUS WITHOUT COMPLICATION, WITHOUT LONG-TERM CURRENT USE OF INSULIN (HCC): ICD-10-CM

## 2024-12-02 ENCOUNTER — TELEPHONE (OUTPATIENT)
Dept: CARDIOLOGY CLINIC | Age: 69
End: 2024-12-02

## 2024-12-02 DIAGNOSIS — E11.9 TYPE 2 DIABETES MELLITUS WITHOUT COMPLICATION, WITHOUT LONG-TERM CURRENT USE OF INSULIN (HCC): ICD-10-CM

## 2024-12-02 DIAGNOSIS — E78.2 MIXED HYPERLIPIDEMIA: ICD-10-CM

## 2024-12-02 LAB
ALBUMIN SERPL-MCNC: 4.1 G/DL (ref 3.4–5)
ALBUMIN/GLOB SERPL: 1.8 {RATIO} (ref 1.1–2.2)
ALP SERPL-CCNC: 64 U/L (ref 40–129)
ALT SERPL-CCNC: 33 U/L (ref 10–40)
ANION GAP SERPL CALCULATED.3IONS-SCNC: 12 MMOL/L (ref 3–16)
AST SERPL-CCNC: 26 U/L (ref 15–37)
BASOPHILS # BLD: 0.1 K/UL (ref 0–0.2)
BASOPHILS NFR BLD: 0.9 %
BILIRUB SERPL-MCNC: 0.5 MG/DL (ref 0–1)
BUN SERPL-MCNC: 19 MG/DL (ref 7–20)
CALCIUM SERPL-MCNC: 9.3 MG/DL (ref 8.3–10.6)
CHLORIDE SERPL-SCNC: 103 MMOL/L (ref 99–110)
CHOLEST SERPL-MCNC: 134 MG/DL (ref 0–199)
CO2 SERPL-SCNC: 24 MMOL/L (ref 21–32)
CREAT SERPL-MCNC: 0.8 MG/DL (ref 0.8–1.3)
CREAT UR-MCNC: 33.8 MG/DL (ref 39–259)
DEPRECATED RDW RBC AUTO: 14.8 % (ref 12.4–15.4)
EOSINOPHIL # BLD: 0.1 K/UL (ref 0–0.6)
EOSINOPHIL NFR BLD: 1.7 %
EST. AVERAGE GLUCOSE BLD GHB EST-MCNC: 159.9 MG/DL
GFR SERPLBLD CREATININE-BSD FMLA CKD-EPI: >90 ML/MIN/{1.73_M2}
GLUCOSE SERPL-MCNC: 122 MG/DL (ref 70–99)
HBA1C MFR BLD: 7.2 %
HCT VFR BLD AUTO: 44.6 % (ref 40.5–52.5)
HDLC SERPL-MCNC: 42 MG/DL (ref 40–60)
HGB BLD-MCNC: 15.3 G/DL (ref 13.5–17.5)
LDL CHOLESTEROL: 80 MG/DL
LYMPHOCYTES # BLD: 2.7 K/UL (ref 1–5.1)
LYMPHOCYTES NFR BLD: 35.9 %
MCH RBC QN AUTO: 30.2 PG (ref 26–34)
MCHC RBC AUTO-ENTMCNC: 34.2 G/DL (ref 31–36)
MCV RBC AUTO: 88.1 FL (ref 80–100)
MICROALBUMIN UR DL<=1MG/L-MCNC: <1.2 MG/DL
MICROALBUMIN/CREAT UR: ABNORMAL MG/G (ref 0–30)
MONOCYTES # BLD: 0.5 K/UL (ref 0–1.3)
MONOCYTES NFR BLD: 7 %
NEUTROPHILS # BLD: 4 K/UL (ref 1.7–7.7)
NEUTROPHILS NFR BLD: 54.5 %
PLATELET # BLD AUTO: 270 K/UL (ref 135–450)
PMV BLD AUTO: 9.1 FL (ref 5–10.5)
POTASSIUM SERPL-SCNC: 4.5 MMOL/L (ref 3.5–5.1)
PROT SERPL-MCNC: 6.4 G/DL (ref 6.4–8.2)
RBC # BLD AUTO: 5.06 M/UL (ref 4.2–5.9)
SODIUM SERPL-SCNC: 139 MMOL/L (ref 136–145)
TRIGL SERPL-MCNC: 61 MG/DL (ref 0–150)
VLDLC SERPL CALC-MCNC: 12 MG/DL
WBC # BLD AUTO: 7.4 K/UL (ref 4–11)

## 2024-12-02 RX ORDER — METFORMIN HYDROCHLORIDE 500 MG/1
2000 TABLET, EXTENDED RELEASE ORAL
Qty: 360 TABLET | Refills: 1 | Status: SHIPPED | OUTPATIENT
Start: 2024-12-02

## 2024-12-02 RX ORDER — ATORVASTATIN CALCIUM 10 MG/1
TABLET, FILM COATED ORAL
Qty: 90 TABLET | Refills: 1 | Status: SHIPPED | OUTPATIENT
Start: 2024-12-02

## 2024-12-02 NOTE — TELEPHONE ENCOUNTER
Notified        Vascular US Duplex Carotid Bilateral       Mild (<50%) stenosis in the bilateral internal carotid arteries.    Normal antegrade flow involving the right vertebral artery.    Left Vertebral Artery: Not well visualized.      Echo (TTE) complete       Left Ventricle: Normal left ventricular systolic function with a visually estimated EF of 55 - 60%. Left ventricle size is normal. Mildly increased wall thickness. Findings consistent with mild concentric hypertrophy. Normal wall motion. Diastolic filling pattern is normal for age.    Aortic Valve: The bioprosthetic valve in the aortic position (23 mm Medtronic Mosaic valve inserted 12/15/2017). Trace paravalvular regurgitation. AV PHT is 853.0 ms. AV mean gradient is 19 mmHg. AV AT is 79.0 ms. AV area by continuity VTI is 1.4 cm2.    Mitral Valve: Mild to moderate annular calcification at the posterior leaflet. Mild regurgitation.    Tricuspid Valve: Normal RVSP. The estimated RVSP is 29 mmHg.    Left Atrium: Left atrium is mildly dilated.    Right Atrium: Right atrium is mildly dilated.    Pericardium: No pericardial effusion.    Image quality is fair.

## 2024-12-04 ENCOUNTER — OFFICE VISIT (OUTPATIENT)
Dept: FAMILY MEDICINE CLINIC | Age: 69
End: 2024-12-04
Payer: MEDICARE

## 2024-12-04 VITALS
HEIGHT: 70 IN | SYSTOLIC BLOOD PRESSURE: 122 MMHG | HEART RATE: 73 BPM | OXYGEN SATURATION: 96 % | WEIGHT: 237.9 LBS | BODY MASS INDEX: 34.06 KG/M2 | DIASTOLIC BLOOD PRESSURE: 72 MMHG

## 2024-12-04 DIAGNOSIS — N52.9 ERECTILE DYSFUNCTION, UNSPECIFIED ERECTILE DYSFUNCTION TYPE: ICD-10-CM

## 2024-12-04 DIAGNOSIS — R35.1 NOCTURIA: ICD-10-CM

## 2024-12-04 DIAGNOSIS — E11.9 TYPE 2 DIABETES MELLITUS WITHOUT COMPLICATION, WITHOUT LONG-TERM CURRENT USE OF INSULIN (HCC): ICD-10-CM

## 2024-12-04 DIAGNOSIS — I38 VHD (VALVULAR HEART DISEASE): ICD-10-CM

## 2024-12-04 DIAGNOSIS — I10 PRIMARY HYPERTENSION: Primary | ICD-10-CM

## 2024-12-04 PROCEDURE — 99214 OFFICE O/P EST MOD 30 MIN: CPT | Performed by: STUDENT IN AN ORGANIZED HEALTH CARE EDUCATION/TRAINING PROGRAM

## 2024-12-04 PROCEDURE — G2211 COMPLEX E/M VISIT ADD ON: HCPCS | Performed by: STUDENT IN AN ORGANIZED HEALTH CARE EDUCATION/TRAINING PROGRAM

## 2024-12-04 PROCEDURE — 1159F MED LIST DOCD IN RCRD: CPT | Performed by: STUDENT IN AN ORGANIZED HEALTH CARE EDUCATION/TRAINING PROGRAM

## 2024-12-04 PROCEDURE — 3078F DIAST BP <80 MM HG: CPT | Performed by: STUDENT IN AN ORGANIZED HEALTH CARE EDUCATION/TRAINING PROGRAM

## 2024-12-04 PROCEDURE — 3051F HG A1C>EQUAL 7.0%<8.0%: CPT | Performed by: STUDENT IN AN ORGANIZED HEALTH CARE EDUCATION/TRAINING PROGRAM

## 2024-12-04 PROCEDURE — 3074F SYST BP LT 130 MM HG: CPT | Performed by: STUDENT IN AN ORGANIZED HEALTH CARE EDUCATION/TRAINING PROGRAM

## 2024-12-04 PROCEDURE — 1124F ACP DISCUSS-NO DSCNMKR DOCD: CPT | Performed by: STUDENT IN AN ORGANIZED HEALTH CARE EDUCATION/TRAINING PROGRAM

## 2024-12-04 NOTE — PROGRESS NOTES
12/16/2024    David Guillen    Chief Complaint   Patient presents with    6 Month Follow-Up     Mixed hyperlipidemia    Immunizations     Pt wants to discuss RSV vaccine and wants to know what does dietary fiber has to do with sugar.       LAKEISHA Hernandez is a 69 y.o. male with a PMHx as listed below who presents today for follow up on chronic conditions. No acute complaints.     Just had new knee  Mild elevation glucose compared to his normal.   History of Present Illness        1. Primary hypertension    2. VHD (valvular heart disease)    3. Type 2 diabetes mellitus without complication, without long-term current use of insulin (HCC)    4. Erectile dysfunction, unspecified erectile dysfunction type    5. Nocturia             REVIEW OF SYMPTOMS    Review of Systems   Constitutional:  Negative for chills and fatigue.   HENT:  Negative for congestion and sore throat.    Respiratory:  Negative for shortness of breath and wheezing.    Cardiovascular:  Negative for chest pain and palpitations.   Gastrointestinal:  Negative for abdominal pain and nausea.   Genitourinary:  Negative for frequency and urgency.   Neurological:  Negative for light-headedness.         PAST MEDICAL HISTORY  Past Medical History:   Diagnosis Date    A-fib (HCC)     Arthritis     Knees, Feet    Atrial fibrillation (HCC)     Back pain     Lower Back    CAD (coronary artery disease)     Diabetes mellitus (HCC)     H/O echocardiogram 12/14/2017    EF 50-55%. severe aortic stenosis is present, mod AR, mild MR and TR    H/O echocardiogram 10/07/2021    EF 55-60%. Mild mitral and tricuspid regurgitation is present.    H/O transesophageal echocardiography (ADIEL) for monitoring 06/08/2023    No evidence of thrombus within the left atrial appendage.  Smoke noted in the left atrium, Mild mitral regurgitation,  There is no evidence of any pericardial effusion.    History of benign prostatic hyperplasia     History of cardiac cath 12/14/2017    Severe AS,

## 2024-12-16 ASSESSMENT — ENCOUNTER SYMPTOMS
SHORTNESS OF BREATH: 0
WHEEZING: 0
SORE THROAT: 0
NAUSEA: 0
ABDOMINAL PAIN: 0

## 2024-12-17 ENCOUNTER — OFFICE VISIT (OUTPATIENT)
Dept: FAMILY MEDICINE CLINIC | Age: 69
End: 2024-12-17
Payer: MEDICARE

## 2024-12-17 VITALS
HEART RATE: 75 BPM | WEIGHT: 235.2 LBS | TEMPERATURE: 97.4 F | BODY MASS INDEX: 33.67 KG/M2 | OXYGEN SATURATION: 97 % | SYSTOLIC BLOOD PRESSURE: 132 MMHG | DIASTOLIC BLOOD PRESSURE: 92 MMHG | HEIGHT: 70 IN

## 2024-12-17 DIAGNOSIS — J01.10 ACUTE NON-RECURRENT FRONTAL SINUSITIS: Primary | ICD-10-CM

## 2024-12-17 PROCEDURE — 3075F SYST BP GE 130 - 139MM HG: CPT | Performed by: STUDENT IN AN ORGANIZED HEALTH CARE EDUCATION/TRAINING PROGRAM

## 2024-12-17 PROCEDURE — 3080F DIAST BP >= 90 MM HG: CPT | Performed by: STUDENT IN AN ORGANIZED HEALTH CARE EDUCATION/TRAINING PROGRAM

## 2024-12-17 PROCEDURE — 99213 OFFICE O/P EST LOW 20 MIN: CPT | Performed by: STUDENT IN AN ORGANIZED HEALTH CARE EDUCATION/TRAINING PROGRAM

## 2024-12-17 PROCEDURE — 1124F ACP DISCUSS-NO DSCNMKR DOCD: CPT | Performed by: STUDENT IN AN ORGANIZED HEALTH CARE EDUCATION/TRAINING PROGRAM

## 2024-12-17 PROCEDURE — 1159F MED LIST DOCD IN RCRD: CPT | Performed by: STUDENT IN AN ORGANIZED HEALTH CARE EDUCATION/TRAINING PROGRAM

## 2024-12-17 RX ORDER — AZITHROMYCIN 250 MG/1
TABLET, FILM COATED ORAL
Qty: 6 TABLET | Refills: 0 | Status: SHIPPED | OUTPATIENT
Start: 2024-12-17 | End: 2024-12-27

## 2024-12-26 RX ORDER — ASPIRIN 81 MG/1
81 TABLET ORAL DAILY
Qty: 90 TABLET | Refills: 1 | Status: SHIPPED | OUTPATIENT
Start: 2024-12-26

## 2025-01-01 DIAGNOSIS — E11.9 TYPE 2 DIABETES MELLITUS WITHOUT COMPLICATION, WITHOUT LONG-TERM CURRENT USE OF INSULIN (HCC): ICD-10-CM

## 2025-01-02 RX ORDER — DULAGLUTIDE 0.75 MG/.5ML
INJECTION, SOLUTION SUBCUTANEOUS
Qty: 12 ADJUSTABLE DOSE PRE-FILLED PEN SYRINGE | Refills: 1 | Status: SHIPPED | OUTPATIENT
Start: 2025-01-02 | End: 2025-07-01

## 2025-01-03 ENCOUNTER — TELEPHONE (OUTPATIENT)
Dept: CARDIOLOGY CLINIC | Age: 70
End: 2025-01-03

## 2025-01-21 ENCOUNTER — OFFICE VISIT (OUTPATIENT)
Dept: CARDIOLOGY CLINIC | Age: 70
End: 2025-01-21
Payer: MEDICARE

## 2025-01-21 VITALS
WEIGHT: 235.6 LBS | SYSTOLIC BLOOD PRESSURE: 134 MMHG | HEART RATE: 73 BPM | HEIGHT: 70 IN | OXYGEN SATURATION: 96 % | BODY MASS INDEX: 33.73 KG/M2 | DIASTOLIC BLOOD PRESSURE: 82 MMHG

## 2025-01-21 DIAGNOSIS — I25.10 CORONARY ARTERY DISEASE INVOLVING NATIVE CORONARY ARTERY OF NATIVE HEART WITHOUT ANGINA PECTORIS: Primary | ICD-10-CM

## 2025-01-21 DIAGNOSIS — I38 VHD (VALVULAR HEART DISEASE): ICD-10-CM

## 2025-01-21 PROCEDURE — 3075F SYST BP GE 130 - 139MM HG: CPT | Performed by: NURSE PRACTITIONER

## 2025-01-21 PROCEDURE — 3079F DIAST BP 80-89 MM HG: CPT | Performed by: NURSE PRACTITIONER

## 2025-01-21 PROCEDURE — 1159F MED LIST DOCD IN RCRD: CPT | Performed by: NURSE PRACTITIONER

## 2025-01-21 PROCEDURE — 99214 OFFICE O/P EST MOD 30 MIN: CPT | Performed by: NURSE PRACTITIONER

## 2025-01-21 PROCEDURE — 1124F ACP DISCUSS-NO DSCNMKR DOCD: CPT | Performed by: NURSE PRACTITIONER

## 2025-01-21 PROCEDURE — 1160F RVW MEDS BY RX/DR IN RCRD: CPT | Performed by: NURSE PRACTITIONER

## 2025-01-21 ASSESSMENT — ENCOUNTER SYMPTOMS
ORTHOPNEA: 0
SHORTNESS OF BREATH: 0

## 2025-01-21 NOTE — PATIENT INSTRUCTIONS
**It is YOUR responsibilty to bring medication bottles and/or updated medication list to EACH APPOINTMENT. This will allow us to better serve you and all your healthcare needs**  Thank you for allowing us to care for you today!   We want to ensure we can follow your treatment plan and we strive to give you the best outcomes and experience possible.   If you ever have a life threatening emergency and call 911 - for an ambulance (EMS)   Our providers can only care for you at:   [facility] or [facility].   Even if you have someone take you or you drive yourself we can only care for you in a [facility]. Our providers are not setup at the other healthcare locations!   Please be informed that if you contact our office outside of normal business hours the physician on call cannot help with any scheduling or rescheduling issues, procedure instruction questions or any type of medication issue.    We advise you for any urgent/emergency that you go to the nearest emergency room!    PLEASE CALL OUR OFFICE DURING NORMAL BUSINESS HOURS    Monday - Friday   8 am to 5 pm    [place]: [phone]    [place]: [phone]    [place]:  [phone]  We are committed to providing you the best care possible.    If you receive a survey after visiting one of our offices, please take time to share your experience concerning your physician office visit.  These surveys are confidential and no health information about you is shared.    We are eager to improve for you and we are counting on your feedback to help make that happen.

## 2025-01-21 NOTE — PROGRESS NOTES
punctuation, and spelling, as well as words and phrases that may be inappropriate. If there are any questions or concerns please feel free to contact the dictating provider for clarification.

## 2025-03-06 ENCOUNTER — TELEPHONE (OUTPATIENT)
Dept: CARDIOLOGY CLINIC | Age: 70
End: 2025-03-06

## 2025-03-06 NOTE — TELEPHONE ENCOUNTER
Faxed Again DOS OV 5/3/24 to [employer]- [facility]- [phone]- they said they didn't receive the one I faxed on 1/3/2025.

## 2025-03-30 DIAGNOSIS — Z95.2 S/P AVR (AORTIC VALVE REPLACEMENT): ICD-10-CM

## 2025-03-31 RX ORDER — CARVEDILOL 25 MG/1
25 TABLET ORAL 2 TIMES DAILY
Qty: 180 TABLET | Refills: 1 | Status: SHIPPED | OUTPATIENT
Start: 2025-03-31

## 2025-04-02 RX ORDER — ATORVASTATIN CALCIUM 10 MG/1
10 TABLET, FILM COATED ORAL DAILY
Qty: 90 TABLET | Refills: 1 | OUTPATIENT
Start: 2025-04-02

## 2025-04-06 ENCOUNTER — RESULTS FOLLOW-UP (OUTPATIENT)
Dept: FAMILY MEDICINE CLINIC | Age: 70
End: 2025-04-06

## 2025-05-29 DIAGNOSIS — E11.9 TYPE 2 DIABETES MELLITUS WITHOUT COMPLICATION, WITHOUT LONG-TERM CURRENT USE OF INSULIN (HCC): ICD-10-CM

## 2025-05-29 DIAGNOSIS — N52.9 ERECTILE DYSFUNCTION, UNSPECIFIED ERECTILE DYSFUNCTION TYPE: ICD-10-CM

## 2025-05-29 DIAGNOSIS — R35.1 NOCTURIA: ICD-10-CM

## 2025-05-29 DIAGNOSIS — I10 PRIMARY HYPERTENSION: ICD-10-CM

## 2025-05-29 LAB
ALBUMIN SERPL-MCNC: 4.2 G/DL (ref 3.4–5)
ALBUMIN/GLOB SERPL: 1.7 {RATIO} (ref 1.1–2.2)
ALP SERPL-CCNC: 61 U/L (ref 40–129)
ALT SERPL-CCNC: 32 U/L (ref 10–40)
ANION GAP SERPL CALCULATED.3IONS-SCNC: 11 MMOL/L (ref 3–16)
AST SERPL-CCNC: 28 U/L (ref 15–37)
BILIRUB SERPL-MCNC: 0.7 MG/DL (ref 0–1)
BUN SERPL-MCNC: 19 MG/DL (ref 7–20)
CALCIUM SERPL-MCNC: 9.3 MG/DL (ref 8.3–10.6)
CHLORIDE SERPL-SCNC: 103 MMOL/L (ref 99–110)
CO2 SERPL-SCNC: 23 MMOL/L (ref 21–32)
CREAT SERPL-MCNC: 0.9 MG/DL (ref 0.8–1.3)
EST. AVERAGE GLUCOSE BLD GHB EST-MCNC: 151.3 MG/DL
GFR SERPLBLD CREATININE-BSD FMLA CKD-EPI: >90 ML/MIN/{1.73_M2}
GLUCOSE SERPL-MCNC: 132 MG/DL (ref 70–99)
HBA1C MFR BLD: 6.9 %
POTASSIUM SERPL-SCNC: 4.6 MMOL/L (ref 3.5–5.1)
PROT SERPL-MCNC: 6.7 G/DL (ref 6.4–8.2)
PSA SERPL DL<=0.01 NG/ML-MCNC: 0.43 NG/ML (ref 0–4)
SODIUM SERPL-SCNC: 137 MMOL/L (ref 136–145)

## 2025-05-29 RX ORDER — METFORMIN HYDROCHLORIDE 500 MG/1
2000 TABLET, EXTENDED RELEASE ORAL
Qty: 360 TABLET | Refills: 1 | Status: SHIPPED | OUTPATIENT
Start: 2025-05-29

## 2025-06-02 ENCOUNTER — RESULTS FOLLOW-UP (OUTPATIENT)
Dept: FAMILY MEDICINE CLINIC | Age: 70
End: 2025-06-02

## 2025-06-03 SDOH — ECONOMIC STABILITY: TRANSPORTATION INSECURITY
IN THE PAST 12 MONTHS, HAS THE LACK OF TRANSPORTATION KEPT YOU FROM MEDICAL APPOINTMENTS OR FROM GETTING MEDICATIONS?: NO

## 2025-06-03 SDOH — ECONOMIC STABILITY: FOOD INSECURITY: WITHIN THE PAST 12 MONTHS, THE FOOD YOU BOUGHT JUST DIDN'T LAST AND YOU DIDN'T HAVE MONEY TO GET MORE.: NEVER TRUE

## 2025-06-03 SDOH — ECONOMIC STABILITY: FOOD INSECURITY: WITHIN THE PAST 12 MONTHS, YOU WORRIED THAT YOUR FOOD WOULD RUN OUT BEFORE YOU GOT MONEY TO BUY MORE.: NEVER TRUE

## 2025-06-03 SDOH — ECONOMIC STABILITY: INCOME INSECURITY: IN THE LAST 12 MONTHS, WAS THERE A TIME WHEN YOU WERE NOT ABLE TO PAY THE MORTGAGE OR RENT ON TIME?: NO

## 2025-06-03 ASSESSMENT — PATIENT HEALTH QUESTIONNAIRE - PHQ9
2. FEELING DOWN, DEPRESSED OR HOPELESS: NOT AT ALL
1. LITTLE INTEREST OR PLEASURE IN DOING THINGS: SEVERAL DAYS
1. LITTLE INTEREST OR PLEASURE IN DOING THINGS: SEVERAL DAYS
SUM OF ALL RESPONSES TO PHQ QUESTIONS 1-9: 1
2. FEELING DOWN, DEPRESSED OR HOPELESS: NOT AT ALL
SUM OF ALL RESPONSES TO PHQ9 QUESTIONS 1 & 2: 1

## 2025-06-05 ENCOUNTER — OFFICE VISIT (OUTPATIENT)
Dept: FAMILY MEDICINE CLINIC | Age: 70
End: 2025-06-05
Payer: MEDICARE

## 2025-06-05 VITALS
HEIGHT: 70 IN | OXYGEN SATURATION: 96 % | WEIGHT: 236.8 LBS | HEART RATE: 71 BPM | SYSTOLIC BLOOD PRESSURE: 120 MMHG | BODY MASS INDEX: 33.9 KG/M2 | DIASTOLIC BLOOD PRESSURE: 78 MMHG

## 2025-06-05 DIAGNOSIS — I50.32 CHRONIC DIASTOLIC HEART FAILURE (HCC): ICD-10-CM

## 2025-06-05 DIAGNOSIS — I48.3 TYPICAL ATRIAL FLUTTER (HCC): ICD-10-CM

## 2025-06-05 DIAGNOSIS — Z23 NEED FOR PROPHYLACTIC VACCINATION AGAINST STREPTOCOCCUS PNEUMONIAE (PNEUMOCOCCUS): Primary | ICD-10-CM

## 2025-06-05 DIAGNOSIS — I10 PRIMARY HYPERTENSION: ICD-10-CM

## 2025-06-05 DIAGNOSIS — I48.91 ATRIAL FIBRILLATION BY ELECTROCARDIOGRAM (HCC): ICD-10-CM

## 2025-06-05 PROCEDURE — 99214 OFFICE O/P EST MOD 30 MIN: CPT | Performed by: STUDENT IN AN ORGANIZED HEALTH CARE EDUCATION/TRAINING PROGRAM

## 2025-06-05 PROCEDURE — 3074F SYST BP LT 130 MM HG: CPT | Performed by: STUDENT IN AN ORGANIZED HEALTH CARE EDUCATION/TRAINING PROGRAM

## 2025-06-05 PROCEDURE — 3078F DIAST BP <80 MM HG: CPT | Performed by: STUDENT IN AN ORGANIZED HEALTH CARE EDUCATION/TRAINING PROGRAM

## 2025-06-05 PROCEDURE — 1124F ACP DISCUSS-NO DSCNMKR DOCD: CPT | Performed by: STUDENT IN AN ORGANIZED HEALTH CARE EDUCATION/TRAINING PROGRAM

## 2025-06-05 PROCEDURE — 1159F MED LIST DOCD IN RCRD: CPT | Performed by: STUDENT IN AN ORGANIZED HEALTH CARE EDUCATION/TRAINING PROGRAM

## 2025-06-05 NOTE — PROGRESS NOTES
6/11/2025    David Guillen    Chief Complaint   Patient presents with    6 Month Follow-Up     HTN, hyperlipidemia, type 2 dm        HPI  History of Present Illness  The patient is a 69-year-old male who presents for a 6-month follow-up on his chronic conditions, including diabetes and atrial fibrillation.    He reports no new health concerns since his last visit. His weight has remained stable at approximately 235 pounds, following an initial weight loss that has since plateaued. Increased physical activity during the summer months, specifically walking for about an hour daily, has contributed to this stability. He has been on Trulicity since 2012 and expresses concern about potential side effects, such as thyroid cancer, associated with this medication. He frequently self-examines his throat for any abnormal lumps or bumps.    He continues to see his cardiologist and has an appointment scheduled at the end of this month. He underwent an echocardiogram last year and is currently engaging in hiking activities. He does not experience any stress or exhaustion but notes occasional shortness of breath. He has a pulse oximeter at home for monitoring his heart rate.    He has received one dose of the Shingrix vaccine and believes he has also received two pneumonia vaccines. He typically receives his influenza vaccine at [facility] and has decided against further COVID-19 vaccinations.      1. Need for prophylactic vaccination against Streptococcus pneumoniae (pneumococcus)    2. Typical atrial flutter (HCC)    3. Chronic diastolic heart failure (HCC)    4. Atrial fibrillation by electrocardiogram (HCC)    5. Primary hypertension             REVIEW OF SYMPTOMS    Review of Systems   Constitutional:  Negative for chills and fatigue.   HENT:  Negative for congestion and sore throat.    Respiratory:  Negative for shortness of breath and wheezing.    Cardiovascular:  Negative for chest pain and palpitations.   Gastrointestinal:

## 2025-06-06 ENCOUNTER — CLINICAL SUPPORT (OUTPATIENT)
Dept: FAMILY MEDICINE CLINIC | Age: 70
End: 2025-06-06
Payer: MEDICARE

## 2025-06-06 DIAGNOSIS — Z23 PNEUMOCOCCAL VACCINE ADMINISTERED: Primary | ICD-10-CM

## 2025-06-06 PROCEDURE — 90677 PCV20 VACCINE IM: CPT | Performed by: STUDENT IN AN ORGANIZED HEALTH CARE EDUCATION/TRAINING PROGRAM

## 2025-06-06 PROCEDURE — G0009 ADMIN PNEUMOCOCCAL VACCINE: HCPCS | Performed by: STUDENT IN AN ORGANIZED HEALTH CARE EDUCATION/TRAINING PROGRAM

## 2025-06-11 ASSESSMENT — ENCOUNTER SYMPTOMS
SORE THROAT: 0
WHEEZING: 0
NAUSEA: 0
SHORTNESS OF BREATH: 0
ABDOMINAL PAIN: 0

## 2025-06-17 RX ORDER — ASPIRIN 81 MG/1
81 TABLET ORAL DAILY
Qty: 90 TABLET | Refills: 1 | Status: SHIPPED | OUTPATIENT
Start: 2025-06-17

## 2025-06-18 DIAGNOSIS — E11.9 TYPE 2 DIABETES MELLITUS WITHOUT COMPLICATION, WITHOUT LONG-TERM CURRENT USE OF INSULIN (HCC): ICD-10-CM

## 2025-06-19 RX ORDER — ATORVASTATIN CALCIUM 10 MG/1
10 TABLET, FILM COATED ORAL DAILY
Qty: 90 TABLET | Refills: 1 | Status: SHIPPED | OUTPATIENT
Start: 2025-06-19

## 2025-06-19 RX ORDER — DULAGLUTIDE 0.75 MG/.5ML
INJECTION, SOLUTION SUBCUTANEOUS
Qty: 12 ADJUSTABLE DOSE PRE-FILLED PEN SYRINGE | Refills: 1 | Status: SHIPPED | OUTPATIENT
Start: 2025-06-19 | End: 2025-12-16

## 2025-07-24 ENCOUNTER — OFFICE VISIT (OUTPATIENT)
Dept: CARDIOLOGY CLINIC | Age: 70
End: 2025-07-24
Payer: MEDICARE

## 2025-07-24 VITALS
DIASTOLIC BLOOD PRESSURE: 78 MMHG | SYSTOLIC BLOOD PRESSURE: 124 MMHG | BODY MASS INDEX: 34.47 KG/M2 | WEIGHT: 240.8 LBS | HEART RATE: 60 BPM | HEIGHT: 70 IN

## 2025-07-24 DIAGNOSIS — Z95.2 S/P AVR (AORTIC VALVE REPLACEMENT): Primary | ICD-10-CM

## 2025-07-24 PROCEDURE — 1159F MED LIST DOCD IN RCRD: CPT | Performed by: INTERNAL MEDICINE

## 2025-07-24 PROCEDURE — 1124F ACP DISCUSS-NO DSCNMKR DOCD: CPT | Performed by: INTERNAL MEDICINE

## 2025-07-24 PROCEDURE — 99214 OFFICE O/P EST MOD 30 MIN: CPT | Performed by: INTERNAL MEDICINE

## 2025-07-24 PROCEDURE — 3078F DIAST BP <80 MM HG: CPT | Performed by: INTERNAL MEDICINE

## 2025-07-24 PROCEDURE — 3074F SYST BP LT 130 MM HG: CPT | Performed by: INTERNAL MEDICINE

## 2025-07-24 NOTE — PROGRESS NOTES
CLINICAL STAFF DOCUMENTATION    Dr. Ha Guillen  1955  [id number]    Have you had any Chest Pain recently? - No      Have you had any Shortness of Breath - No      Have you had any dizziness - No      Have you had any palpitations recently? - No      Do you have any edema - swelling in No        Is the patient on any of the following medications -   When did you have your last labs drawn 5/2025  What doctor ordered Twin   Do we have the labs in their chart Yes      Do you need any prescriptions refilled? - No    Do you have a surgery or procedure scheduled in the near future - No    Do use tobacco products? - No  Do you drink alcohol? - 3 beers a week   Do you use any illicit drugs? - No  Caffeine? - Yes  How much caffeine? .6  Cups of decaf coffee       Check medication list thoroughly!!! AND RECONCILE OUTSIDE MEDICATIONS  If dose has changed change the entire order not just the MG  BE SURE TO ASK PATIENT IF THEY NEED MEDICATION REFILLS  Verify Pharmacy and update if incorrect    Add to every patient's \"wrap up\" the following dot phrase AFTERVISITCARDIOHEARTHOUSE and ensure we explain this to our patients   
2017 along with AVR stable has no issues       nterpretation summary:  Heavy calcification with multivessel disease  High-grade stenosis of LAD circumflex and RCA is possibly due to disease heavy calcification tandem lesion  SVG to RCA is patent with mild calcification in the midsegment  S/p aortic valve replacement artifact seen  Myocardium does not show any evidence of infarct or thickening.                 -  Hypertension: Patients blood pressure is abnormal. Patient is advised about low sodium diet. Present medical regimen will not be changed.    On Coreg blood pressure is well controlled however he has put it on hold for now     -  LIPID MANAGEMENT:  Importance of lipid levels discussed with patient   and patient was given dietary advice. NCEP- ATP III guidelines reviewed with patient.    -   Changes  in medicines made: No     On Lipitor 10 mg p.o. daily we will check the LDL     -   DIABETES MELLITUS: Available pertinent lab data reviewed   and  patient was given dietary advice . Advised to check blood glucose level on a regular basis.      -   Changes  in medicines made: No        Metformin we will check the A1c patient compliant        Mortality from the morbid obesity is very high:     Body mass index is 36.22 kg/m².      1/24   Findings  The left main is a large tubular vessel divides into left and descending artery which is multiple areas of calcification however no critical stenosis is noted essentially unchanged from the previous cath diagonal is a large-caliber vessel  Circumflex is a nondominant vessel no significant stenosis  The right coronary artery is a large-caliber vessel the PDA is filling from competitive flow from the saphenous vein graft appears to have 80% stenosis in the PDA  The saphenous vein graft to the PDA is widely patent     Assessment and plan  Will continue with medical therapy CTA showing stenosis however it is a calcified area  Will proceed with EP study and will discuss with

## 2025-07-24 NOTE — PATIENT INSTRUCTIONS
Thank you for allowing us to care for you today!   We want to ensure we can follow your treatment plan and we strive to give you the best outcomes and experience possible.   If you ever have a life threatening emergency and call 911 - for an ambulance (EMS)  REMEMBER  Our providers can only care for you at:   [facility] or [facility]   Even if you have someone take you or you drive yourself we can only care for you in a [facility] facility. Our providers are not setup at the other healthcare locations!    PLEASE CALL OUR OFFICE DURING NORMAL BUSINESS HOURS  Monday through Friday 8 am to 5 pm  AFTER HOURS the physician on-call cannot help with scheduling, rescheduling, procedure instruction questions or any type of medication need or issue.  [facility] P:[phone] - [facility] P:[phone] - [facility] P:[phone]      If you receive a survey:  We would appreciate you taking the time to share your experience concerning your provider visit in the office.    These surveys are confidential!  We are eager to improve and are counting on you to share your feedback so we can ensure you get the best care possible.

## 2025-07-25 ENCOUNTER — TELEMEDICINE (OUTPATIENT)
Dept: FAMILY MEDICINE CLINIC | Age: 70
End: 2025-07-25

## 2025-07-25 DIAGNOSIS — Z00.00 INITIAL MEDICARE ANNUAL WELLNESS VISIT: Primary | ICD-10-CM

## 2025-07-25 ASSESSMENT — LIFESTYLE VARIABLES
HOW OFTEN DO YOU HAVE A DRINK CONTAINING ALCOHOL: 2-3 TIMES A WEEK
HOW MANY STANDARD DRINKS CONTAINING ALCOHOL DO YOU HAVE ON A TYPICAL DAY: 1 OR 2

## 2025-07-25 ASSESSMENT — PATIENT HEALTH QUESTIONNAIRE - PHQ9
2. FEELING DOWN, DEPRESSED OR HOPELESS: NOT AT ALL
SUM OF ALL RESPONSES TO PHQ QUESTIONS 1-9: 0
SUM OF ALL RESPONSES TO PHQ QUESTIONS 1-9: 0
1. LITTLE INTEREST OR PLEASURE IN DOING THINGS: NOT AT ALL
SUM OF ALL RESPONSES TO PHQ QUESTIONS 1-9: 0
SUM OF ALL RESPONSES TO PHQ QUESTIONS 1-9: 0

## 2025-07-25 NOTE — PROGRESS NOTES
Medicare Annual Wellness Visit    David Guillen is here for Medicare AWV    Assessment & Plan   Initial Medicare annual wellness visit     No follow-ups on file.     Subjective       Patient's complete Health Risk Assessment and screening values have been reviewed and are found in Flowsheets. The following problems were reviewed today and where indicated follow up appointments were made and/or referrals ordered.    Positive Risk Factor Screenings with Interventions:    Fall Risk:  Do you feel unsteady or are you worried about falling? : (!) yes (some balance issues-no device)  2 or more falls in past year?: no  Fall with injury in past year?: no  Interventions:    Patient comments: patient states he does have some balance issues, but, he does not feel like he needs a cane at this time.  Reviewed medications, home hazards, visual acuity, and co-morbidities that can increase risk for falls  Patient declines any further evaluation or treatment              Poor Eating Habits/Diet:  Do you eat balanced/healthy meals regularly?: (!) No  Interventions:  Patient comments: states he eats a lot of frozen meals.  Patient declines any further evaluation or treatment    Abnormal BMI (obese):  There is no height or weight on file to calculate BMI. (!) Abnormal  Interventions:  Patient declines any further evaluation or treatment           Safety:  Do you have any tripping hazards - loose or unsecured carpets or rugs?: (!) Yes  Interventions:  Patient comments: he states the rugs are down for his dogs, and they are not where he could trip or lose his balance on them.  Patient declined any further interventions or treatment                    Objective    Patient-Reported Vitals  No data recorded     Unable to obtain 3 vital signs due to patient not having equipment to take blood pressure/temperature.              No Known Allergies  Prior to Visit Medications    Medication Sig Taking? Authorizing Provider   dulaglutide

## 2025-07-25 NOTE — PATIENT INSTRUCTIONS
Personalized Preventive Plan for David Guillen - 7/25/2025  Medicare offers a range of preventive health benefits. Some of the tests and screenings are paid in full while other may be subject to a deductible, co-insurance, and/or copay.  Some of these benefits include a comprehensive review of your medical history including lifestyle, illnesses that may run in your family, and various assessments and screenings as appropriate.  After reviewing your medical record and screening and assessments performed today your provider may have ordered immunizations, labs, imaging, and/or referrals for you.  A list of these orders (if applicable) as well as your Preventive Care list are included within your After Visit Summary for your review.

## (undated) DEVICE — PATCH REF EXT FOR CARTO 3 SYS (EA = 6 PACKS)

## (undated) DEVICE — INTRODUCER SHTH 8.5FR L60CM DIA18MM 0.038IN TIP L3MM RAMP

## (undated) DEVICE — CATHETER EP 6FR L115CM 2-8-2MM SPC TIP 2MM 10 ELECTRD CSD

## (undated) DEVICE — GLIDESHEATH SLENDER ACCESS KIT: Brand: GLIDESHEATH SLENDER

## (undated) DEVICE — BAND COMPR L24CM REG CLR PLAS HEMSTAT EXT HK AND LOOP RETEN

## (undated) DEVICE — INTRODUCER SHTH 8FR L12CM DIA0.038IN STD HEMSTAS CLOSE TOL

## (undated) DEVICE — NEEDLE ANGIO L1IN DIA21GA 1 THN WALL SMOOTH STD HUB

## (undated) DEVICE — RADIFOCUS OPTITORQUE ANGIOGRAPHIC CATHETER: Brand: OPTITORQUE

## (undated) DEVICE — SHEATH INTRO 9FR L12CM GWIRE L150CM DIA0.038IN STD HEMSTAS

## (undated) DEVICE — PERCUTANEOUS ENTRY THINWALL NEEDLE  ONE-PART: Brand: COOK

## (undated) DEVICE — Device

## (undated) DEVICE — ANGIOGRAPHY KIT CUST MANIFOLD

## (undated) DEVICE — TUBING PMP FOR CARTO SYS SMARTABLATE

## (undated) DEVICE — SHEATH INTRO 7FR L12CM GWIRE L150CM DIA0.038IN STD HEMSTAS

## (undated) DEVICE — CATHETER ABLAT 8FR L115CM 1-6-2MM SPC TIP 3.5MM DF CRV

## (undated) DEVICE — GUIDEWIRE VASC L260CM DIA0.035IN RAD 3MM J TIP L7CM PTFE

## (undated) DEVICE — CATHETER EP 6FR L110CM 2-5-2MM SPC 4 ELECTRD A CRV NYL